# Patient Record
Sex: MALE | Race: BLACK OR AFRICAN AMERICAN | NOT HISPANIC OR LATINO | Employment: FULL TIME | URBAN - METROPOLITAN AREA
[De-identification: names, ages, dates, MRNs, and addresses within clinical notes are randomized per-mention and may not be internally consistent; named-entity substitution may affect disease eponyms.]

---

## 2017-01-06 ENCOUNTER — THERAPY VISIT (OUTPATIENT)
Dept: PHYSICAL THERAPY | Facility: CLINIC | Age: 67
End: 2017-01-06
Payer: COMMERCIAL

## 2017-01-06 DIAGNOSIS — M25.819 SHOULDER IMPINGEMENT, UNSPECIFIED LATERALITY: Primary | ICD-10-CM

## 2017-01-06 PROCEDURE — 97112 NEUROMUSCULAR REEDUCATION: CPT | Mod: GP | Performed by: PHYSICAL THERAPY ASSISTANT

## 2017-01-06 PROCEDURE — 97140 MANUAL THERAPY 1/> REGIONS: CPT | Mod: GP | Performed by: PHYSICAL THERAPY ASSISTANT

## 2017-01-06 PROCEDURE — 97110 THERAPEUTIC EXERCISES: CPT | Mod: GP | Performed by: PHYSICAL THERAPY ASSISTANT

## 2017-01-13 ENCOUNTER — OFFICE VISIT (OUTPATIENT)
Dept: FAMILY MEDICINE | Facility: CLINIC | Age: 67
End: 2017-01-13
Payer: COMMERCIAL

## 2017-01-13 VITALS
HEIGHT: 65 IN | WEIGHT: 139 LBS | DIASTOLIC BLOOD PRESSURE: 64 MMHG | TEMPERATURE: 98.6 F | SYSTOLIC BLOOD PRESSURE: 128 MMHG | HEART RATE: 92 BPM | BODY MASS INDEX: 23.16 KG/M2

## 2017-01-13 DIAGNOSIS — E78.5 HYPERLIPIDEMIA WITH TARGET LDL LESS THAN 100: ICD-10-CM

## 2017-01-13 DIAGNOSIS — I10 BENIGN ESSENTIAL HYPERTENSION: ICD-10-CM

## 2017-01-13 DIAGNOSIS — M25.819 SHOULDER IMPINGEMENT, UNSPECIFIED LATERALITY: Primary | ICD-10-CM

## 2017-01-13 DIAGNOSIS — Z23 NEED FOR PROPHYLACTIC VACCINATION AND INOCULATION AGAINST INFLUENZA: ICD-10-CM

## 2017-01-13 DIAGNOSIS — E11.9 TYPE 2 DIABETES MELLITUS WITHOUT COMPLICATION, WITHOUT LONG-TERM CURRENT USE OF INSULIN (H): ICD-10-CM

## 2017-01-13 PROCEDURE — 82043 UR ALBUMIN QUANTITATIVE: CPT | Performed by: FAMILY MEDICINE

## 2017-01-13 PROCEDURE — 99214 OFFICE O/P EST MOD 30 MIN: CPT | Mod: 25 | Performed by: FAMILY MEDICINE

## 2017-01-13 PROCEDURE — 90662 IIV NO PRSV INCREASED AG IM: CPT | Performed by: FAMILY MEDICINE

## 2017-01-13 PROCEDURE — G0009 ADMIN PNEUMOCOCCAL VACCINE: HCPCS | Performed by: FAMILY MEDICINE

## 2017-01-13 RX ORDER — TRAMADOL HYDROCHLORIDE 50 MG/1
50 TABLET ORAL EVERY 6 HOURS PRN
Qty: 30 TABLET | Refills: 0 | Status: SHIPPED | OUTPATIENT
Start: 2017-01-13 | End: 2017-04-18

## 2017-01-13 RX ORDER — TRAMADOL HYDROCHLORIDE 50 MG/1
50-100 TABLET ORAL EVERY 6 HOURS PRN
Qty: 30 TABLET | Refills: 0 | Status: SHIPPED | OUTPATIENT
Start: 2017-01-13 | End: 2017-06-22

## 2017-01-13 RX ORDER — TRAMADOL HYDROCHLORIDE 50 MG/1
50-100 TABLET ORAL EVERY 6 HOURS PRN
Qty: 30 TABLET | Refills: 0 | Status: SHIPPED | OUTPATIENT
Start: 2017-01-13 | End: 2017-04-07

## 2017-01-13 NOTE — PROGRESS NOTES
Injectable Influenza Immunization Documentation    1.  Is the person to be vaccinated sick today?  No    2. Does the person to be vaccinated have an allergy to eggs or to a component of the vaccine?  No    3. Has the person to be vaccinated today ever had a serious reaction to influenza vaccine in the past?  No    4. Has the person to be vaccinated ever had Guillain-[place] syndrome?  No     Form completed by Courtney Holley MA

## 2017-01-13 NOTE — NURSING NOTE
"Chief Complaint   Patient presents with     Chronic Disease Management       Initial /64 mmHg  Pulse 92  Temp(Src) 98.6  F (37  C) (Oral)  Ht 5' 4.5\" (1.638 m)  Wt 139 lb (63.05 kg)  BMI 23.50 kg/m2 Estimated body mass index is 23.5 kg/(m^2) as calculated from the following:    Height as of this encounter: 5' 4.5\" (1.638 m).    Weight as of this encounter: 139 lb (63.05 kg).  BP completed using cuff size: regular    Courtney Holley MA   "

## 2017-01-13 NOTE — PROGRESS NOTES
SUBJECTIVE:                                                    Devon Watts is a 66 year old male who presents to clinic today for the following health issues:    Diabetes Follow-up      Patient is checking blood sugars: Usually every day 111     Diabetic concerns: None     Symptoms of hypoglycemia (low blood sugar): none     Paresthesias (numbness or burning in feet) or sores: No     Date of last diabetic eye exam: December 2016  A1C      6.4   9/9/2016     Hyperlipidemia Follow-Up      Rate your low fat/cholesterol diet?: not monitoring fat    Taking statin?  Yes, no muscle aches from statin    Other lipid medications/supplements?:  None  LDL CHOLESTEROL DIRECT   Date Value Ref Range Status   09/09/2016 86 <100 mg/dL Final     Comment:     Desirable:       <100 mg/dl   ]       Amount of exercise or physical activity: Every day, he goes to physical therapy    Problems taking medications regularly: No    Medication side effects: none    Diet: low salt     Hypertension Follow-up      Outpatient blood pressures are not being checked.    Low Salt Diet: no added salt     HPI;  He has impingement syndrome in his right shoulder.  He had been on tramadol for this for a long time.  He started PT in 9/2016.  He has found this to be very helpful.  He does twice a week.  He has also been doing it at home.  The pain as 10/10 and now is 6/10.   He is still taking tramadol 0-1 a day either before bed or before work if he has pain.   He has not had a steroid shot.  He is requesting a flu shot.  His PT thinks there is arthritis in his shoulder.  We have not received his records from his old clinic.  He had a colonoscopy twice.  He thinks they were both normal.  He says it has been a while.      Problem list and histories reviewed & adjusted, as indicated.  Additional history: as documented    BP Readings from Last 3 Encounters:   01/13/17 128/64   10/17/16 122/54   09/09/16 112/62    Wt Readings from Last 3 Encounters:  "  01/13/17 139 lb (63.05 kg)   10/17/16 134 lb (60.782 kg)   09/09/16 134 lb (60.782 kg)         ROS:  Constitutional, HEENT, cardiovascular, pulmonary, GI, , musculoskeletal, neuro, skin, endocrine and psych systems are negative, except as otherwise noted.    OBJECTIVE:                                                    /64 mmHg  Pulse 92  Temp(Src) 98.6  F (37  C) (Oral)  Ht 5' 4.5\" (1.638 m)  Wt 139 lb (63.05 kg)  BMI 23.50 kg/m2  Body mass index is 23.5 kg/(m^2).  GENERAL: healthy, alert and no distress  HENT: ear canals and TM's normal, nose and mouth without ulcers or lesions  NECK: no adenopathy, no asymmetry, masses, or scars and thyroid normal to palpation  RESP: lungs clear to auscultation - no rales, rhonchi or wheezes  CV: regular rate and rhythm, normal S1 S2, no S3 or S4, no murmur, click or rub, no peripheral edema and peripheral pulses strong  MS: tenderness to palpation right anterior shoulder with tenderness over supra and infraspinatus   SKIN: no suspicious lesions or rashes  NEURO: Normal strength and tone, mentation intact and speech normal  PSYCH: mentation appears normal, affect normal/bright    Diagnostic Test Results:  none      ASSESSMENT/PLAN:                                                    Diabetes Type II, A1c Controlled, non-insulin dependent   Associated with the following complications    Renal Complications:  None    Ophthalmologic Complications: None    Neurologic Complications: None    Peripheral Vascular Complications:  None    Other: None   Plan:  No changes in the patient's current treatment plan  Labs:  Microalbumin    Hyperlipidemia; controlled   Plan:  No changes in the patient's current treatment plan    Hypertension; controlled   Associated with the following complications:    Diabetes   Plan:  No changes in the patient's current treatment plan          ICD-10-CM    1. Shoulder impingement, unspecified laterality M75.40 traMADol (ULTRAM) 50 MG tablet     " traMADol (ULTRAM) 50 MG tablet     traMADol (ULTRAM) 50 MG tablet   2. Type 2 diabetes mellitus without complication, without long-term current use of insulin (H) E11.9 Albumin Random Urine Quantitative   3. Hyperlipidemia with target LDL less than 100 E78.5    4. Benign essential hypertension I10    5. Need for prophylactic vaccination and inoculation against influenza Z23 FLU VACCINE, INCREASED ANTIGEN, PRESV FREE, AGE 65+ [46211]     Vaccine Administration, Initial [80658]     We discussed referral to ortho and steroid injection.  He will hold off for now.     FUTURE APPOINTMENTS:       - Follow-up visit in 3 months for tramadol and DM    Niocle Magdaleno DO  [facility]

## 2017-01-13 NOTE — Clinical Note
[facility]  [address] [place] [id number] [phone]      January 16, 2017      Devon Jonesjosse  [address] [address]          Dear Mr. Watts    The results of your recent lab tests were within normal limits. Enclosed is a copy of these results.  If you have any further questions or problems, please contact our office.    Sincerely,      Nicole Magdaleno, DO/sd    Results for orders placed or performed in visit on 01/13/17   Albumin Random Urine Quantitative   Result Value Ref Range    Creatinine Urine 105 mg/dL    Albumin Urine mg/L 6 mg/L    Albumin Urine mg/g Cr 5.72 0 - 17 mg/g Cr

## 2017-01-13 NOTE — MR AVS SNAPSHOT
"              After Visit Summary   1/13/2017    Devon Watts    MRN: [id number]           Patient Information     Date Of Birth          1950        Visit Information        Provider Department      1/13/2017 12:40 PM Nicole Magdaleno DO [facility]        Today's Diagnoses     Shoulder impingement, unspecified laterality    -  1     Type 2 diabetes mellitus without complication, without long-term current use of insulin (H)         Hyperlipidemia with target LDL less than 100         Benign essential hypertension         Impingement syndrome of both shoulders           Care Instructions    Follow up for DM in 3 months.     Nicole Magdaleno D.OSteven          Follow-ups after your visit        Who to contact     If you have questions or need follow up information about today's clinic visit or your schedule please contact [facility] directly at [phone].  Normal or non-critical lab and imaging results will be communicated to you by MyChart, letter or phone within 4 business days after the clinic has received the results. If you do not hear from us within 7 days, please contact the clinic through MyChart or phone. If you have a critical or abnormal lab result, we will notify you by phone as soon as possible.  Submit refill requests through [employer] Â® or call your pharmacy and they will forward the refill request to us. Please allow 3 business days for your refill to be completed.          Additional Information About Your Visit        Metropolisthart Information     [employer] Â® lets you send messages to your doctor, view your test results, renew your prescriptions, schedule appointments and more. To sign up, go to [email or web address] Â® . Click on \"Log in\" on the left side of the screen, which will take you to the Welcome page. Then click on \"Sign up Now\" on the right side of the page.     You will be asked to enter the access code listed below, as well as some personal information. Please " "follow the directions to create your username and password.     Your access code is: [email or web address]  Expires: 2017  1:14 PM     Your access code will  in 90 days. If you need help or a new code, please call your [facility] clinic or [phone].        Care EveryWhere ID     This is your Care EveryWhere ID. This could be used by other organizations to access your [facility] medical records  [id number]        Your Vitals Were     Pulse Temperature Height BMI (Body Mass Index)          92 98.6  F (37  C) (Oral) 5' 4.5\" (1.638 m) 23.50 kg/m2         Blood Pressure from Last 3 Encounters:   17 128/64   10/17/16 122/54   16 112/62    Weight from Last 3 Encounters:   17 139 lb (63.05 kg)   10/17/16 134 lb (60.782 kg)   16 134 lb (60.782 kg)              Today, you had the following     No orders found for display         Today's Medication Changes          These changes are accurate as of: 17  1:14 PM.  If you have any questions, ask your nurse or doctor.               These medicines have changed or have updated prescriptions.        Dose/Directions    * traMADol 50 MG tablet   Commonly known as:  ULTRAM   This may have changed:  additional instructions   Used for:  Impingement syndrome of both shoulders   Changed by:  Nicole Magdaleno, DO        Dose:   mg   Take 1-2 tablets ( mg) by mouth every 6 hours as needed for pain (maximum 8 tablets per day) Do not fill before 3/9/17   Quantity:  30 tablet   Refills:  0       * traMADol 50 MG tablet   Commonly known as:  ULTRAM   This may have changed:  additional instructions   Used for:  Impingement syndrome of both shoulders   Changed by:  Nicole Magdaleno,         Dose:   mg   Take 1-2 tablets ( mg) by mouth every 6 hours as needed for pain (maximum 8 tablets per day) Do not fill before 17   Quantity:  30 tablet   Refills:  0       * traMADol 50 MG tablet   Commonly known as:  ULTRAM   This may have changed:  " additional instructions   Used for:  Impingement syndrome of both shoulders   Changed by:  Nicole Magdaleno DO        Dose:  50 mg   Take 1 tablet (50 mg) by mouth every 6 hours as needed for moderate pain Take 1-2 tablets as needed do not fill before 1/13/17   Quantity:  30 tablet   Refills:  0       * Notice:  This list has 3 medication(s) that are the same as other medications prescribed for you. Read the directions carefully, and ask your doctor or other care provider to review them with you.         Where to get your medicines      Some of these will need a paper prescription and others can be bought over the counter.  Ask your nurse if you have questions.     Bring a paper prescription for each of these medications    - traMADol 50 MG tablet  - traMADol 50 MG tablet  - traMADol 50 MG tablet             Primary Care Provider Office Phone # Fax #    Nicole Magdaleno [phone]       [address] [address]        Thank you!     Thank you for choosing [facility]  for your care. Our goal is always to provide you with excellent care. Hearing back from our patients is one way we can continue to improve our services. Please take a few minutes to complete the written survey that you may receive in the mail after your visit with us. Thank you!             Your Updated Medication List - Protect others around you: Learn how to safely use, store and throw away your medicines at www.disposemymeds.org.          This list is accurate as of: 1/13/17  1:14 PM.  Always use your most recent med list.                   Brand Name Dispense Instructions for use    aspirin 325 MG tablet      Take 325 mg by mouth daily       atorvastatin 10 MG tablet    LIPITOR    90 tablet    Take 1 tablet (10 mg) by mouth daily       glipiZIDE 10 MG tablet    GLUCOTROL    180 tablet    Take 1 tablet (10 mg) by mouth 2 times daily (before meals)       lisinopril 20 MG  tablet    PRINIVIL/ZESTRIL    90 tablet    Take 1 tablet (20 mg) by mouth daily       metFORMIN 1000 MG tablet    GLUCOPHAGE    180 tablet    Take 1 tablet (1,000 mg) by mouth 2 times daily (with meals)       * traMADol 50 MG tablet    ULTRAM    30 tablet    Take 1-2 tablets ( mg) by mouth every 6 hours as needed for pain (maximum 8 tablets per day) Do not fill before 3/9/17       * traMADol 50 MG tablet    ULTRAM    30 tablet    Take 1-2 tablets ( mg) by mouth every 6 hours as needed for pain (maximum 8 tablets per day) Do not fill before 2/11/17       * traMADol 50 MG tablet    ULTRAM    30 tablet    Take 1 tablet (50 mg) by mouth every 6 hours as needed for moderate pain Take 1-2 tablets as needed do not fill before 1/13/17       * Notice:  This list has 3 medication(s) that are the same as other medications prescribed for you. Read the directions carefully, and ask your doctor or other care provider to review them with you.

## 2017-01-14 LAB
CREAT UR-MCNC: 105 MG/DL
MICROALBUMIN UR-MCNC: 6 MG/L
MICROALBUMIN/CREAT UR: 5.72 MG/G CR (ref 0–17)

## 2017-02-13 DIAGNOSIS — E78.5 HYPERLIPIDEMIA WITH TARGET LDL LESS THAN 100: ICD-10-CM

## 2017-02-13 NOTE — TELEPHONE ENCOUNTER
atorvastatin (LIPITOR) 10 MG tablet     Last Written Prescription Date: 9-9-2016  Last Fill Quantity: 90, # refills: 1  Last Office Visit with [facility], [facility] or [facility] prescribing provider: 1-  Next 5 appointments (look out 90 days)     Apr 18, 2017  3:00 PM CDT   Office Visit with Nicole Magdaleno DO   [facility]    [address] [place] [id number] [phone]                   No results found for: CHOL  No results found for: HDL  Lab Results   Component Value Date    LDL 86 09/09/2016     No results found for: TRIG  No results found for: CHOLHDLRATIO

## 2017-02-14 RX ORDER — ATORVASTATIN CALCIUM 10 MG/1
10 TABLET, FILM COATED ORAL DAILY
Qty: 90 TABLET | Refills: 1 | Status: SHIPPED | OUTPATIENT
Start: 2017-02-14 | End: 2017-11-20

## 2017-02-14 NOTE — TELEPHONE ENCOUNTER
Some labs weren't done at last visit but MD reviewed in 9/9 note, prescription approved per [facility] [place] Refill Protocol.  Irena Govea RN

## 2017-04-07 DIAGNOSIS — M25.819 SHOULDER IMPINGEMENT, UNSPECIFIED LATERALITY: ICD-10-CM

## 2017-04-10 NOTE — TELEPHONE ENCOUNTER
traMADol (ULTRAM) 50 MG tablet    mg, EVERY 6 HOURS PRN 0 ordered  Edit     Summary: Take 1-2 tablets ( mg) by mouth every 6 hours as needed for pain (maximum 8 tablets per day) Do not fill before 3/9/17, Disp-30 tablet, R-0, Local Print   Dose, Route, Frequency:  mg, Oral, EVERY 6 HOURS PRN  Start: 1/13/2017  Ord/Sold: 1/13/2017 (O)  Report  Taking:   Long-term:   Pharmacy: [facility] [id number] [place] [address] AT SEC OF [facility] Dose History       Patient Sig: Take 1-2 tablets ( mg) by mouth every 6 hours as needed for pain (maximum 8 tablets per day) Do not fill before 3/9/17       Ordered on: 1/13/2017       Authorized by: IFEOMA NORIEGA       Dispense: 30 tablet       Admin Instructions: Do not fill before 3/9/17          Last Office Visit with [facility] or  Health prescribing provider: 1-  Future Office visit:    Next 5 appointments (look out 90 days)     Apr 18, 2017  3:00 PM CDT   Office Visit with Ifeoma Noriega DO   [facility]    [address] [place] [id number] [phone]                   Routing refill request to provider for review/approval because:  Drug not on the [facility], P or  Health refill protocol or controlled substance

## 2017-04-11 RX ORDER — TRAMADOL HYDROCHLORIDE 50 MG/1
TABLET ORAL
Qty: 30 TABLET | Refills: 0 | Status: SHIPPED | OUTPATIENT
Start: 2017-04-11 | End: 2017-04-18

## 2017-04-11 NOTE — TELEPHONE ENCOUNTER
Okay for refill one month but he was told to come in in April for refills.      Nicole Magdaleno D.O.

## 2017-04-18 ENCOUNTER — OFFICE VISIT (OUTPATIENT)
Dept: FAMILY MEDICINE | Facility: CLINIC | Age: 67
End: 2017-04-18
Payer: COMMERCIAL

## 2017-04-18 VITALS
HEART RATE: 88 BPM | DIASTOLIC BLOOD PRESSURE: 62 MMHG | HEIGHT: 65 IN | TEMPERATURE: 97.6 F | SYSTOLIC BLOOD PRESSURE: 116 MMHG | BODY MASS INDEX: 22.08 KG/M2 | WEIGHT: 132.5 LBS

## 2017-04-18 DIAGNOSIS — Z11.59 NEED FOR HEPATITIS C SCREENING TEST: ICD-10-CM

## 2017-04-18 DIAGNOSIS — Z13.89 SCREENING FOR DIABETIC PERIPHERAL NEUROPATHY: ICD-10-CM

## 2017-04-18 DIAGNOSIS — M75.41 IMPINGEMENT SYNDROME OF BOTH SHOULDERS: ICD-10-CM

## 2017-04-18 DIAGNOSIS — I10 BENIGN ESSENTIAL HYPERTENSION: ICD-10-CM

## 2017-04-18 DIAGNOSIS — Z23 NEED FOR PROPHYLACTIC VACCINATION AGAINST STREPTOCOCCUS PNEUMONIAE (PNEUMOCOCCUS): ICD-10-CM

## 2017-04-18 DIAGNOSIS — E78.5 HYPERLIPIDEMIA WITH TARGET LDL LESS THAN 100: ICD-10-CM

## 2017-04-18 DIAGNOSIS — M75.42 IMPINGEMENT SYNDROME OF BOTH SHOULDERS: ICD-10-CM

## 2017-04-18 DIAGNOSIS — E11.9 TYPE 2 DIABETES MELLITUS WITHOUT COMPLICATION, WITHOUT LONG-TERM CURRENT USE OF INSULIN (H): Primary | ICD-10-CM

## 2017-04-18 LAB — HBA1C MFR BLD: 6.4 % (ref 4.3–6)

## 2017-04-18 PROCEDURE — 83036 HEMOGLOBIN GLYCOSYLATED A1C: CPT | Performed by: FAMILY MEDICINE

## 2017-04-18 PROCEDURE — 86803 HEPATITIS C AB TEST: CPT | Performed by: FAMILY MEDICINE

## 2017-04-18 PROCEDURE — 99214 OFFICE O/P EST MOD 30 MIN: CPT | Mod: 25 | Performed by: FAMILY MEDICINE

## 2017-04-18 PROCEDURE — 36415 COLL VENOUS BLD VENIPUNCTURE: CPT | Performed by: FAMILY MEDICINE

## 2017-04-18 PROCEDURE — 80048 BASIC METABOLIC PNL TOTAL CA: CPT | Performed by: FAMILY MEDICINE

## 2017-04-18 PROCEDURE — 90471 IMMUNIZATION ADMIN: CPT | Performed by: FAMILY MEDICINE

## 2017-04-18 PROCEDURE — 90732 PPSV23 VACC 2 YRS+ SUBQ/IM: CPT | Performed by: FAMILY MEDICINE

## 2017-04-18 PROCEDURE — 84443 ASSAY THYROID STIM HORMONE: CPT | Performed by: FAMILY MEDICINE

## 2017-04-18 RX ORDER — LISINOPRIL 20 MG/1
20 TABLET ORAL DAILY
Qty: 90 TABLET | Refills: 1 | Status: SHIPPED | OUTPATIENT
Start: 2017-04-18 | End: 2017-11-20

## 2017-04-18 RX ORDER — TRAMADOL HYDROCHLORIDE 50 MG/1
50 TABLET ORAL EVERY 6 HOURS PRN
Qty: 30 TABLET | Refills: 0 | Status: SHIPPED | OUTPATIENT
Start: 2017-04-18 | End: 2017-06-22

## 2017-04-18 RX ORDER — GLIPIZIDE 10 MG/1
10 TABLET ORAL
Qty: 180 TABLET | Refills: 1 | Status: SHIPPED | OUTPATIENT
Start: 2017-04-18 | End: 2017-11-20

## 2017-04-18 RX ORDER — TRAMADOL HYDROCHLORIDE 50 MG/1
TABLET ORAL
Qty: 30 TABLET | Refills: 0 | Status: SHIPPED | OUTPATIENT
Start: 2017-04-18 | End: 2017-06-22

## 2017-04-18 NOTE — MR AVS SNAPSHOT
"              After Visit Summary   4/18/2017    Devon Watts    MRN: [id number]           Patient Information     Date Of Birth          1950        Visit Information        Provider Department      4/18/2017 3:00 PM Nicole Magdaleno DO [facility]        Today's Diagnoses     Type 2 diabetes mellitus without complication, without long-term current use of insulin (H)    -  1    Need for hepatitis C screening test        Screening for diabetic peripheral neuropathy        Need for prophylactic vaccination against Streptococcus pneumoniae (pneumococcus)        Benign essential hypertension        Hyperlipidemia with target LDL less than 100        Impingement syndrome of both shoulders        Shoulder impingement, unspecified laterality           Follow-ups after your visit        Who to contact     If you have questions or need follow up information about today's clinic visit or your schedule please contact [facility] directly at [phone].  Normal or non-critical lab and imaging results will be communicated to you by Thinkgluehart, letter or phone within 4 business days after the clinic has received the results. If you do not hear from us within 7 days, please contact the clinic through Thinkgluehart or phone. If you have a critical or abnormal lab result, we will notify you by phone as soon as possible.  Submit refill requests through M[employer] or call your pharmacy and they will forward the refill request to us. Please allow 3 business days for your refill to be completed.          Additional Information About Your Visit        MyChart Information     Mersive lets you send messages to your doctor, view your test results, renew your prescriptions, schedule appointments and more. To sign up, go to [email or web address] . Click on \"Log in\" on the left side of the screen, which will take you to the Welcome page. Then click on \"Sign up Now\" on the right side of the page.     You will " "be asked to enter the access code listed below, as well as some personal information. Please follow the directions to create your username and password.     Your access code is: [email or web address]  Expires: 2017  3:55 PM     Your access code will  in 90 days. If you need help or a new code, please call your [facility] clinic or [phone].        Care EveryWhere ID     This is your Care EveryWhere ID. This could be used by other organizations to access your [facility] medical records  [id number]        Your Vitals Were     Pulse Temperature Height BMI (Body Mass Index)          88 97.6  F (36.4  C) (Oral) 5' 4.5\" (1.638 m) 22.39 kg/m2         Blood Pressure from Last 3 Encounters:   17 116/62   17 128/64   10/17/16 122/54    Weight from Last 3 Encounters:   17 132 lb 8 oz (60.1 kg)   17 139 lb (63 kg)   10/17/16 134 lb (60.8 kg)              We Performed the Following     Basic metabolic panel  (Ca, Cl, CO2, Creat, Gluc, K, Na, BUN)     HEMOGLOBIN A1C     Hepatitis C Screen Reflex to HCV RNA Quant and Genotype     Pneumococcal vaccine 23 valent PPSV23  (Pneumovax) [30351]     TSH WITH FREE T4 REFLEX          Today's Medication Changes          These changes are accurate as of: 17  3:55 PM.  If you have any questions, ask your nurse or doctor.               These medicines have changed or have updated prescriptions.        Dose/Directions    * traMADol 50 MG tablet   Commonly known as:  ULTRAM   This may have changed:  Another medication with the same name was changed. Make sure you understand how and when to take each.   Used for:  Shoulder impingement, unspecified laterality   Changed by:  Nicole Magdaleno,         Dose:   mg   Take 1-2 tablets ( mg) by mouth every 6 hours as needed for pain (maximum 8 tablets per day) Do not fill before 17   Quantity:  30 tablet   Refills:  0       * traMADol 50 MG tablet   Commonly known as:  ULTRAM   This may have changed:  See the " new instructions.   Used for:  Shoulder impingement, unspecified laterality   Changed by:  Nicole Magdaleno DO        TAKE 1 TO 2 TABLETS BY MOUTH EVERY 6 HOURS AS NEEDED FOR PAIN(MAX 8 TABLETS PER DAY) Do not fill before 5/11/17   Quantity:  30 tablet   Refills:  0       * traMADol 50 MG tablet   Commonly known as:  ULTRAM   This may have changed:  additional instructions   Used for:  Shoulder impingement, unspecified laterality   Changed by:  Nicole Magdaleno DO        Dose:  50 mg   Take 1 tablet (50 mg) by mouth every 6 hours as needed for moderate pain Take 1-2 tablets as needed do not fill before 6/11/17   Quantity:  30 tablet   Refills:  0       * Notice:  This list has 3 medication(s) that are the same as other medications prescribed for you. Read the directions carefully, and ask your doctor or other care provider to review them with you.         Where to get your medicines      These medications were sent to [facility] [address] [place] [address] AT [facility] OF [address] [address]     Phone:  [phone]     glipiZIDE 10 MG tablet    lisinopril 20 MG tablet    metFORMIN 1000 MG tablet         Some of these will need a paper prescription and others can be bought over the counter.  Ask your nurse if you have questions.     Bring a paper prescription for each of these medications     traMADol 50 MG tablet    traMADol 50 MG tablet                Primary Care Provider Office Phone # Fax #    Nicole DO Sharla [phone]       [address] [address]        Thank you!     Thank you for choosing [facility]  for your care. Our goal is always to provide you with excellent care. Hearing back from our patients is one way we can continue to improve our services. Please take a few minutes to complete the written survey that you may receive in the mail after your visit with us. Thank  you!             Your Updated Medication List - Protect others around you: Learn how to safely use, store and throw away your medicines at www.disposemymeds.org.          This list is accurate as of: 4/18/17  3:55 PM.  Always use your most recent med list.                   Brand Name Dispense Instructions for use    aspirin 325 MG tablet      Take 325 mg by mouth daily       atorvastatin 10 MG tablet    LIPITOR    90 tablet    Take 1 tablet (10 mg) by mouth daily       glipiZIDE 10 MG tablet    GLUCOTROL    180 tablet    Take 1 tablet (10 mg) by mouth 2 times daily (before meals)       lisinopril 20 MG tablet    PRINIVIL/ZESTRIL    90 tablet    Take 1 tablet (20 mg) by mouth daily       metFORMIN 1000 MG tablet    GLUCOPHAGE    180 tablet    Take 1 tablet (1,000 mg) by mouth 2 times daily (with meals)       * traMADol 50 MG tablet    ULTRAM    30 tablet    Take 1-2 tablets ( mg) by mouth every 6 hours as needed for pain (maximum 8 tablets per day) Do not fill before 2/11/17       * traMADol 50 MG tablet    ULTRAM    30 tablet    TAKE 1 TO 2 TABLETS BY MOUTH EVERY 6 HOURS AS NEEDED FOR PAIN(MAX 8 TABLETS PER DAY) Do not fill before 5/11/17       * traMADol 50 MG tablet    ULTRAM    30 tablet    Take 1 tablet (50 mg) by mouth every 6 hours as needed for moderate pain Take 1-2 tablets as needed do not fill before 6/11/17       * Notice:  This list has 3 medication(s) that are the same as other medications prescribed for you. Read the directions carefully, and ask your doctor or other care provider to review them with you.

## 2017-04-18 NOTE — PROGRESS NOTES
"  SUBJECTIVE:                                                    Devon Watts is a 66 year old male who presents to clinic today for the following health issues:      Diabetes Follow-up    Patient is checking blood sugars: once daily.  Results are as follows:         am - 111 this morning          Last night - 132    Diabetic concerns: None     Symptoms of hypoglycemia (low blood sugar): none     Paresthesias (numbness or burning in feet) or sores: No     Date of last diabetic eye exam: within the last year    Glucophage, glipizide   Lab Results   Component Value Date    A1C 6.4 04/18/2017    A1C 6.4 09/09/2016            Hyperlipidemia Follow-Up      Rate your low fat/cholesterol diet?: good    Taking statin?  yes    Other lipid medications/supplements?:  None  LDL Cholesterol Direct   Date Value Ref Range Status   09/09/2016 86 <100 mg/dL Final     Comment:     Desirable:       <100 mg/dl          Hypertension Follow-up      Outpatient blood pressures are not being checked.    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  He take one a day but sometimes two.  He has done PT which helped a lot.  He continues to do this at home.  He is not interested in steroid injections.  He used to play field hockey.      He had a colonoscopy several years ago.  We do not have the records.  He has even stopped by the clinic to get the forms.        Amount of exercise or physical activity: mostly everyday with doing PT exercises     Problems taking medications regularly: No    Medication side effects: none    Diet: regular (no restrictions)      Patient is concerned about taking the 1000mg tablets of the metformin twice daily.  He has a problem with the tablets being \"huge\".    Problem list and histories reviewed & adjusted, as indicated.  Additional history: as documented    BP Readings from Last 3 Encounters:   04/18/17 116/62   01/13/17 128/64   10/17/16 122/54    Wt Readings from " "Last 3 Encounters:   04/18/17 132 lb 8 oz (60.1 kg)   01/13/17 139 lb (63 kg)   10/17/16 134 lb (60.8 kg)                    Reviewed and updated as needed this visit by clinical staff       Reviewed and updated as needed this visit by Provider         ROS:  Constitutional, HEENT, cardiovascular, pulmonary, GI, , musculoskeletal, neuro, skin, endocrine and psych systems are negative, except as otherwise noted.    OBJECTIVE:                                                    /62 (BP Location: Right arm, Cuff Size: Adult Regular)  Pulse 88  Temp 97.6  F (36.4  C) (Oral)  Ht 5' 4.5\" (1.638 m)  Wt 132 lb 8 oz (60.1 kg)  BMI 22.39 kg/m2  Body mass index is 22.39 kg/(m^2).  GENERAL: healthy, alert and no distress  HENT: ear canals and TM's normal, nose and mouth without ulcers or lesions  NECK: no adenopathy, no asymmetry, masses, or scars and thyroid normal to palpation  RESP: lungs clear to auscultation - no rales, rhonchi or wheezes  CV: regular rate and rhythm, normal S1 S2, no S3 or S4, no murmur, click or rub, no peripheral edema and peripheral pulses strong  MS: no gross musculoskeletal defects noted, no edema  SKIN: no suspicious lesions or rashes  NEURO: Normal strength and tone, mentation intact and speech normal  PSYCH: mentation appears normal, affect normal/bright    Diagnostic Test Results:  Results for orders placed or performed in visit on 04/18/17   HEMOGLOBIN A1C   Result Value Ref Range    Hemoglobin A1C 6.4 (H) 4.3 - 6.0 %   **Hepatitis C Screen Reflex to RNA FUTURE anytime   Result Value Ref Range    Hepatitis C Antibody  NR     Nonreactive   Assay performance characteristics have not been established for newborns,   infants, and children     TSH with free T4 reflex   Result Value Ref Range    TSH 3.71 0.40 - 4.00 mU/L   Basic metabolic panel  (Ca, Cl, CO2, Creat, Gluc, K, Na, BUN)   Result Value Ref Range    Sodium 146 (H) 133 - 144 mmol/L    Potassium 4.2 3.4 - 5.3 mmol/L    Chloride " "110 (H) 94 - 109 mmol/L    Carbon Dioxide 26 20 - 32 mmol/L    Anion Gap 10 3 - 14 mmol/L    Glucose 54 (L) 70 - 99 mg/dL    Urea Nitrogen 26 7 - 30 mg/dL    Creatinine 1.27 (H) 0.66 - 1.25 mg/dL    GFR Estimate 57 (L) >60 mL/min/1.7m2    GFR Estimate If Black 68 >60 mL/min/1.7m2    Calcium 9.5 8.5 - 10.1 mg/dL        ASSESSMENT/PLAN:                                                    Diabetes Type II, A1c Controlled, non-insulin dependent   Associated with the following complications    Renal Complications:  None    Ophthalmologic Complications: None    Neurologic Complications: None    Peripheral Vascular Complications:  None    Other: None   Plan:  No changes in the patient's current treatment plan    Hyperlipidemia; controlled   Plan:  No changes in the patient's current treatment plan    Hypertension; controlled   Associated with the following complications:    Diabetes   Plan:  No changes in the patient's current treatment plan  Labs:   BMP      BMI:   Estimated body mass index is 22.39 kg/(m^2) as calculated from the following:    Height as of this encounter: 5' 4.5\" (1.638 m).    Weight as of this encounter: 132 lb 8 oz (60.1 kg).   Weight management plan: Discussed healthy diet and exercise guidelines and patient will follow up in 6 months in clinic to re-evaluate.        ICD-10-CM    1. Type 2 diabetes mellitus without complication, without long-term current use of insulin (H) E11.9 HEMOGLOBIN A1C     metFORMIN (GLUCOPHAGE) 1000 MG tablet     glipiZIDE (GLUCOTROL) 10 MG tablet     TSH with free T4 reflex     Basic metabolic panel  (Ca, Cl, CO2, Creat, Gluc, K, Na, BUN)     CANCELED: TSH WITH FREE T4 REFLEX     CANCELED: Basic metabolic panel  (Ca, Cl, CO2, Creat, Gluc, K, Na, BUN)   2. Benign essential hypertension I10 lisinopril (PRINIVIL/ZESTRIL) 20 MG tablet   3. Hyperlipidemia with target LDL less than 100 E78.5    4. Impingement syndrome of both shoulders M75.41     M75.42    5. Need for hepatitis C " screening test Z11.59 **Hepatitis C Screen Reflex to RNA FUTURE anytime     CANCELED: Hepatitis C Screen Reflex to HCV RNA Quant and Genotype   6. Screening for diabetic peripheral neuropathy Z13.89    7. Need for prophylactic vaccination against Streptococcus pneumoniae (pneumococcus) Z23 Pneumococcal vaccine 23 valent PPSV23  (Pneumovax) [56261]     The current medical regimen is effective;  continue present plan and medications.  He has done PT for his shoulder but was stared on tramadol for this by another provider and feels he still needs it.     FUTURE APPOINTMENTS:       - Follow-up visit in 6 months for pain and DM     Nicole Magdaleno DO  [facility]

## 2017-04-18 NOTE — NURSING NOTE
Prior to injection verified patient identity using patient's name and date of birth.  Rosalia Govea CMA    Screening Questionnaire for Adult Immunization    Are you sick today?   No   Do you have allergies to medications, food, a vaccine component or latex?   No   Have you ever had a serious reaction after receiving a vaccination?   No   Do you have a long-term health problem with heart disease, lung disease, asthma, kidney disease, metabolic disease (e.g. diabetes), anemia, or other blood disorder?   No   Do you have cancer, leukemia, HIV/AIDS, or any other immune system problem?   No   In the past 3 months, have you taken medications that affect  your immune system, such as prednisone, other steroids, or anticancer drugs; drugs for the treatment of rheumatoid arthritis, Crohn s disease, or psoriasis; or have you had radiation treatments?   No   Have you had a seizure, or a brain or other nervous system problem?   No   During the past year, have you received a transfusion of blood or blood     products, or been given immune (gamma) globulin or antiviral drug?   No   For women: Are you pregnant or is there a chance you could become        pregnant during the next month?   No   Have you received any vaccinations in the past 4 weeks?   No     Immunization questionnaire answers were all negative.      MNVFC doesn't apply on this patient    Per orders of Dr. Magdaleno, injection of Pneumococcal 23 given by Rosalia Govea. Patient instructed to remain in clinic for 20 minutes afterwards, and to report any adverse reaction to me immediately.       Screening performed by Rosalia Govea on 4/18/2017 at 6:36 PM.

## 2017-04-18 NOTE — NURSING NOTE
"Chief Complaint   Patient presents with     Diabetes       Initial There were no vitals taken for this visit. Estimated body mass index is 23.49 kg/(m^2) as calculated from the following:    Height as of 1/13/17: 5' 4.5\" (1.638 m).    Weight as of 1/13/17: 139 lb (63 kg).  Medication Reconciliation: complete   Rosalia Govea CMA      "

## 2017-04-19 LAB
ANION GAP SERPL CALCULATED.3IONS-SCNC: 10 MMOL/L (ref 3–14)
BUN SERPL-MCNC: 26 MG/DL (ref 7–30)
CALCIUM SERPL-MCNC: 9.5 MG/DL (ref 8.5–10.1)
CHLORIDE SERPL-SCNC: 110 MMOL/L (ref 94–109)
CO2 SERPL-SCNC: 26 MMOL/L (ref 20–32)
CREAT SERPL-MCNC: 1.27 MG/DL (ref 0.66–1.25)
GFR SERPL CREATININE-BSD FRML MDRD: 57 ML/MIN/1.7M2
GLUCOSE SERPL-MCNC: 54 MG/DL (ref 70–99)
HCV AB SERPL QL IA: NORMAL
POTASSIUM SERPL-SCNC: 4.2 MMOL/L (ref 3.4–5.3)
SODIUM SERPL-SCNC: 146 MMOL/L (ref 133–144)
TSH SERPL DL<=0.005 MIU/L-ACNC: 3.71 MU/L (ref 0.4–4)

## 2017-04-20 ENCOUNTER — TELEPHONE (OUTPATIENT)
Dept: FAMILY MEDICINE | Facility: CLINIC | Age: 67
End: 2017-04-20

## 2017-04-20 DIAGNOSIS — R79.89 ELEVATED SERUM CREATININE: Primary | ICD-10-CM

## 2017-04-20 NOTE — TELEPHONE ENCOUNTER
Telephone call to Devon at home #. Informed of the decreased kidney function and the need to adequately hydrate with water and then recheck the blood test.  I scheduled him for repeat lab on Friday 4/28 as that is the earliest he felt he could come back in.  Joselyn Starkey RN  Triage  [facility]  [phone]

## 2017-04-20 NOTE — TELEPHONE ENCOUNTER
Please let this patient know that his renal function has worsened.  He should drink lots of water and we can recheck this.  If it stays diminished we may have to adjust his DM medication because metformin goes through the kidneys.    Nicole Magdaleno D.O.

## 2017-05-23 ENCOUNTER — TELEPHONE (OUTPATIENT)
Dept: FAMILY MEDICINE | Facility: CLINIC | Age: 67
End: 2017-05-23

## 2017-05-23 NOTE — TELEPHONE ENCOUNTER
5/23/2017    Call Regarding Preventive Health Screening Colonoscopy    Attempt 1    Message Spoke with patient, he does does not feel so good. Patient would like a callback next time    Comments:       Outreach   CC

## 2017-06-09 ENCOUNTER — TELEPHONE (OUTPATIENT)
Dept: GASTROENTEROLOGY | Facility: OUTPATIENT CENTER | Age: 67
End: 2017-06-09

## 2017-06-13 ENCOUNTER — TELEPHONE (OUTPATIENT)
Dept: GASTROENTEROLOGY | Facility: OUTPATIENT CENTER | Age: 67
End: 2017-06-13

## 2017-06-20 ENCOUNTER — TELEPHONE (OUTPATIENT)
Dept: GASTROENTEROLOGY | Facility: OUTPATIENT CENTER | Age: 67
End: 2017-06-20

## 2017-06-22 ENCOUNTER — OFFICE VISIT (OUTPATIENT)
Dept: FAMILY MEDICINE | Facility: CLINIC | Age: 67
End: 2017-06-22
Payer: COMMERCIAL

## 2017-06-22 VITALS
HEART RATE: 78 BPM | BODY MASS INDEX: 23.15 KG/M2 | WEIGHT: 137 LBS | SYSTOLIC BLOOD PRESSURE: 122 MMHG | DIASTOLIC BLOOD PRESSURE: 58 MMHG | TEMPERATURE: 98.4 F

## 2017-06-22 DIAGNOSIS — M25.819 SHOULDER IMPINGEMENT, UNSPECIFIED LATERALITY: Primary | ICD-10-CM

## 2017-06-22 DIAGNOSIS — E78.5 HYPERLIPIDEMIA WITH TARGET LDL LESS THAN 100: ICD-10-CM

## 2017-06-22 DIAGNOSIS — E11.9 TYPE 2 DIABETES MELLITUS WITHOUT COMPLICATION, WITHOUT LONG-TERM CURRENT USE OF INSULIN (H): ICD-10-CM

## 2017-06-22 DIAGNOSIS — I10 BENIGN ESSENTIAL HYPERTENSION: ICD-10-CM

## 2017-06-22 PROCEDURE — 36415 COLL VENOUS BLD VENIPUNCTURE: CPT | Performed by: FAMILY MEDICINE

## 2017-06-22 PROCEDURE — 80048 BASIC METABOLIC PNL TOTAL CA: CPT | Performed by: FAMILY MEDICINE

## 2017-06-22 PROCEDURE — 99214 OFFICE O/P EST MOD 30 MIN: CPT | Performed by: FAMILY MEDICINE

## 2017-06-22 PROCEDURE — 83721 ASSAY OF BLOOD LIPOPROTEIN: CPT | Performed by: FAMILY MEDICINE

## 2017-06-22 RX ORDER — TRAMADOL HYDROCHLORIDE 50 MG/1
50 TABLET ORAL EVERY 6 HOURS PRN
Qty: 30 TABLET | Refills: 0 | Status: SHIPPED | OUTPATIENT
Start: 2017-06-22 | End: 2017-11-20

## 2017-06-22 RX ORDER — TRAMADOL HYDROCHLORIDE 50 MG/1
TABLET ORAL
Qty: 30 TABLET | Refills: 0 | Status: SHIPPED | OUTPATIENT
Start: 2017-06-22 | End: 2017-11-20

## 2017-06-22 RX ORDER — TRAMADOL HYDROCHLORIDE 50 MG/1
50-100 TABLET ORAL EVERY 6 HOURS PRN
Qty: 30 TABLET | Refills: 0 | Status: SHIPPED | OUTPATIENT
Start: 2017-06-22 | End: 2017-10-03

## 2017-06-22 NOTE — LETTER
[facility]  [address] [place] [id number] [phone]      June 27, 2017      Devon Watts  [address] [address]          Dear Mr. Watts    The results of your recent lab tests were within normal limits. Enclosed is a copy of these results.  If you have any further questions or problems, please contact our office.  Results for orders placed or performed in visit on 06/22/17   LDL cholesterol direct   Result Value Ref Range    LDL Cholesterol Direct 77 <100 mg/dL   Basic metabolic panel  (Ca, Cl, CO2, Creat, Gluc, K, Na, BUN)   Result Value Ref Range    Sodium 144 133 - 144 mmol/L    Potassium 4.2 3.4 - 5.3 mmol/L    Chloride 109 94 - 109 mmol/L    Carbon Dioxide 24 20 - 32 mmol/L    Anion Gap 11 3 - 14 mmol/L    Glucose 57 (L) 70 - 99 mg/dL    Urea Nitrogen 16 7 - 30 mg/dL    Creatinine 1.10 0.66 - 1.25 mg/dL    GFR Estimate 67 >60 mL/min/1.7m2    GFR Estimate If Black 81 >60 mL/min/1.7m2    Calcium 9.8 8.5 - 10.1 mg/dL       Sincerely,      Nicole Magdaleno DO/sa

## 2017-06-22 NOTE — PATIENT INSTRUCTIONS
[facility]   Discharged by : Maricruz WESTBROOK Certified Medical Assistant (AAMA)   Paper scripts provided to patient : 3   If you have any questions regarding to your visit please contact your care team:   [facility] Hours Telephone Number   MATIAS Rangel Dr., PA-C    7am-7pm Monday - Thursday   7am-5pm Fridays  [phone]   (Appointment scheduling available 24/7)   RN Line   [phone] option 2       What options do I have for visits at the clinic other than the traditional office visit?   To expand how we care for you, many of our providers are utilizing electronic visits (e-visits) and telephone visits, when medically appropriate, for interactions with their patients rather than a visit in the clinic. We also offer nurse visits for many medical concerns. Just like any other service, we will bill your insurance company for this type of visit based on time spent on the phone with your provider. Not all insurance companies cover these visits. Please check with your medical insurance if this type of visit is covered. You will be responsible for any charges that are not paid by your insurance.     E-visits via [employer]: generally incur a $35.00 fee.     Telephone visits:   Time spent on the phone: *charged based on time that is spent on the phone in increments of 10 minutes. Estimated cost:   5-10 mins $30.00   11-20 mins. $59.00   21-30 mins. $85.00   Use [employer] (secure email communication and access to your chart) to send your primary care provider a message or make an appointment. Ask someone on your Team how to sign up for GoGuide.   For a Price Quote for your services, please call our Consumer Price Line at [phone].   As always, Thank you for trusting us with your health care needs!                [facility]:    Scheduling Appointments  [facility]  Call: [phone]    [facility]  Call: [phone]    [facility]  Call: [phone]

## 2017-06-22 NOTE — NURSING NOTE
"Chief Complaint   Patient presents with     Diabetes       Initial /58 (Cuff Size: Adult Regular)  Pulse 78  Temp 98.4  F (36.9  C) (Oral)  Wt 137 lb (62.1 kg)  BMI 23.15 kg/m2 Estimated body mass index is 23.15 kg/(m^2) as calculated from the following:    Height as of 4/18/17: 5' 4.5\" (1.638 m).    Weight as of this encounter: 137 lb (62.1 kg).  Medication Reconciliation: complete   Rena See SINDY Ivory      "

## 2017-06-22 NOTE — PROGRESS NOTES
SUBJECTIVE:                                                    Devon Watts is a 66 year old male who presents to clinic today for the following health issues:    Diabetes Follow-up    Patient is checking blood sugars: once daily.  Results are as follows:         am and sometimes pm - results are around 130 and below    Diabetic concerns: None     Symptoms of hypoglycemia (low blood sugar): none     Paresthesias (numbness or burning in feet) or sores: Yes once in a while     Date of last diabetic eye exam: December 2016    Metformin 1000 mg    Glipizide 10 mg  Lab Results   Component Value Date    A1C 6.4 04/18/2017    A1C 6.4 09/09/2016          Amount of exercise or physical activity: 6-7 days/week for an average of 15-30 minutes    Problems taking medications regularly: No    Medication side effects: none    Diet: regular (no restrictions)    Hyperlipidemia Follow-Up      Rate your low fat/cholesterol diet?: good    Taking statin?  Yes    Other lipid medications/supplements?:  none    Lisinopril 20 mg  LDL Cholesterol Direct   Date Value Ref Range Status   09/09/2016 86 <100 mg/dL Final     Comment:     Desirable:       <100 mg/dl        Hypertension Follow-up      Outpatient blood pressures are not being checked.    Low Salt Diet: no added salt    Lipitor 10 mg     Impingement Syndrome  Impingement syndrome of both shoulders with arthritis as well, which he takes 30 tramadol a month for for break through pain. No up to date X-rays. He has gone to physical therapy. He used to play field hockey as a kid but does not recall any major injury or trauma.     Elevated Creatinine  Patient notes an elevated creatinine and was told to drink a lot of water as a result.     Of Note: He will be traveling to [facility] in early July to visit sick family members and will return 3 months later.     Problem list and histories reviewed & adjusted, as indicated.  Additional history: as documented    Patient Active Problem List    Diagnosis     Type 2 diabetes mellitus without complication (H)     Hyperlipidemia with target LDL less than 100     Benign essential hypertension     Impingement syndrome of both shoulders     History reviewed. No pertinent surgical history.    Social History   Substance Use Topics     Smoking status: Never Smoker     Smokeless tobacco: Not on file     Alcohol use No     Family History   Problem Relation Age of Onset     Hypertension Son      DIABETES Son      Breast Cancer No family hx of      Colon Cancer No family hx of      Prostate Cancer No family hx of      Other Cancer No family hx of          BP Readings from Last 3 Encounters:   06/22/17 122/58   04/18/17 116/62   01/13/17 128/64    Wt Readings from Last 3 Encounters:   06/22/17 62.1 kg (137 lb)   04/18/17 60.1 kg (132 lb 8 oz)   01/13/17 63 kg (139 lb)        Reviewed and updated as needed this visit by clinical staff  Tobacco  Allergies  Meds  Med Hx  Surg Hx  Fam Hx  Soc Hx      Reviewed and updated as needed this visit by Provider         ROS:  Constitutional, HEENT, cardiovascular, pulmonary, GI, , musculoskeletal, neuro, skin, endocrine and psych systems are negative, except as otherwise noted.    This document serves as a record of the services and decisions personally performed and made by Nicole Magdaleno DO. It was created on her/his behalf by Autumn Obregon, a trained medical scribe. The creation of this document is based on the provider's statements to the medical scribe.  Autumn Obregon June 22, 2017 1:27 PM    OBJECTIVE:                                                    /58 (Cuff Size: Adult Regular)  Pulse 78  Temp 98.4  F (36.9  C) (Oral)  Wt 62.1 kg (137 lb)  BMI 23.15 kg/m2  Body mass index is 23.15 kg/(m^2).  GENERAL: healthy, alert and no distress  HENT: ear canals and TM's normal, nose and mouth without ulcers or lesions  NECK: no adenopathy, no asymmetry, masses, or scars and thyroid normal to palpation  RESP: lungs  clear to auscultation - no rales, rhonchi or wheezes  CV: regular rate and rhythm, normal S1 S2, no S3 or S4, no murmur, click or rub, no peripheral edema and peripheral pulses strong  MS: slight tenderness of anterior shoulder bilaterally, crepitus in shoulders bilaterally, no gross musculoskeletal defects noted, no edema  PSYCH: mentation appears normal, affect normal/bright    Diagnostic Test Results:  No results found for this or any previous visit (from the past 24 hour(s)).     ASSESSMENT/PLAN:                                                    Diabetes Type II, A1c Controlled, non-insulin dependent   Associated with the following complications    Renal Complications:  None    Ophthalmologic Complications: None    Neurologic Complications: None    Peripheral Vascular Complications:  None    Other: None   Plan:  No changes in the patient's current treatment plan    Hyperlipidemia; controlled   Plan:  No changes in the patient's current treatment plan    Hypertension; controlled   Associated with the following complications:    None   Plan:  No changes in the patient's current treatment plan        ICD-10-CM    1. Shoulder impingement, unspecified laterality M75.40 traMADol (ULTRAM) 50 MG tablet     traMADol (ULTRAM) 50 MG tablet     traMADol (ULTRAM) 50 MG tablet   2. Hyperlipidemia with target LDL less than 100 E78.5 LDL cholesterol direct   3. Type 2 diabetes mellitus without complication, without long-term current use of insulin (H) E11.9 Basic metabolic panel  (Ca, Cl, CO2, Creat, Gluc, K, Na, BUN)   4. Benign essential hypertension I10 Basic metabolic panel  (Ca, Cl, CO2, Creat, Gluc, K, Na, BUN)     He has a diagnosis of impingement syndrome of shoulders but I suspect arthritis and we will get a confirmatory xray upon his return from [facility] in 3 months. The current medical regimen is effective;  continue present plan and medications.     There are no Patient Instructions on file for this visit.    Nicole  DO Sharla  [facility]    This document serves as a record of the services and decisions personally performed and made by Nicole Magdaleno DO. It was created on her behalf by Autumn Obregon, a trained medical scribe. The creation of this document is based on the provider's statements to the medical scribe.  Autumn Obregon June 22, 2017 1:26 PM

## 2017-06-23 LAB
ANION GAP SERPL CALCULATED.3IONS-SCNC: 11 MMOL/L (ref 3–14)
BUN SERPL-MCNC: 16 MG/DL (ref 7–30)
CALCIUM SERPL-MCNC: 9.8 MG/DL (ref 8.5–10.1)
CHLORIDE SERPL-SCNC: 109 MMOL/L (ref 94–109)
CO2 SERPL-SCNC: 24 MMOL/L (ref 20–32)
CREAT SERPL-MCNC: 1.1 MG/DL (ref 0.66–1.25)
GFR SERPL CREATININE-BSD FRML MDRD: 67 ML/MIN/1.7M2
GLUCOSE SERPL-MCNC: 57 MG/DL (ref 70–99)
LDLC SERPL DIRECT ASSAY-MCNC: 77 MG/DL
POTASSIUM SERPL-SCNC: 4.2 MMOL/L (ref 3.4–5.3)
SODIUM SERPL-SCNC: 144 MMOL/L (ref 133–144)

## 2017-06-26 ENCOUNTER — TELEPHONE (OUTPATIENT)
Dept: GASTROENTEROLOGY | Facility: OUTPATIENT CENTER | Age: 67
End: 2017-06-26

## 2017-06-26 DIAGNOSIS — Z12.11 ENCOUNTER FOR SCREENING COLONOSCOPY: Primary | ICD-10-CM

## 2017-06-26 NOTE — TELEPHONE ENCOUNTER
Patient taking any blood thinners ?  Aspirin    Heart disease ? denies    Lung disease ? denies       Sleep apnea ? denies    Diabetic ? Type 2    Kidney disease ? denies    Dialysis ? n/a    Electronic implanted medical devices ? denies    Are you taking any narcotic pain medication ? Tramadol prn  What is your daily dosage ?    PTSD ? n/a    Prep instructions reviewed with patient ? Instructions,  policy, conscious sedation plan reviewed. Advised patient to have someone stay with him post exam    Ph[facility]acy : [facility]    Indication for procedure : screening colonoscopy    Referring provider : Self

## 2017-07-07 ENCOUNTER — TRANSFERRED RECORDS (OUTPATIENT)
Dept: HEALTH INFORMATION MANAGEMENT | Facility: CLINIC | Age: 67
End: 2017-07-07

## 2017-07-07 ENCOUNTER — DOCUMENTATION ONLY (OUTPATIENT)
Dept: GASTROENTEROLOGY | Facility: OUTPATIENT CENTER | Age: 67
End: 2017-07-07

## 2017-07-11 LAB — COPATH REPORT: NORMAL

## 2017-10-02 ENCOUNTER — HOSPITAL ENCOUNTER (OUTPATIENT)
Dept: BEHAVIORAL HEALTH | Facility: CLINIC | Age: 67
Discharge: HOME | End: 2017-10-02
Attending: SOCIAL WORKER | Admitting: SOCIAL WORKER
Payer: COMMERCIAL

## 2017-10-02 PROCEDURE — H0001 ALCOHOL AND/OR DRUG ASSESS: HCPCS

## 2017-10-02 ASSESSMENT — PAIN SCALES - GENERAL: PAINLEVEL: MODERATE PAIN (4)

## 2017-10-02 ASSESSMENT — PATIENT HEALTH QUESTIONNAIRE - PHQ9: SUM OF ALL RESPONSES TO PHQ QUESTIONS 1-9: 0

## 2017-10-02 ASSESSMENT — ANXIETY QUESTIONNAIRES
3. WORRYING TOO MUCH ABOUT DIFFERENT THINGS: NOT AT ALL
6. BECOMING EASILY ANNOYED OR IRRITABLE: NOT AT ALL
1. FEELING NERVOUS, ANXIOUS, OR ON EDGE: NOT AT ALL
GAD7 TOTAL SCORE: 0
5. BEING SO RESTLESS THAT IT IS HARD TO SIT STILL: NOT AT ALL
4. TROUBLE RELAXING: NOT AT ALL
7. FEELING AFRAID AS IF SOMETHING AWFUL MIGHT HAPPEN: NOT AT ALL
2. NOT BEING ABLE TO STOP OR CONTROL WORRYING: NOT AT ALL
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
## 2017-10-02 NOTE — PROGRESS NOTES
Rule 25 Assessment  Background Information   1. Date of Assessment Request  2. Date of Assessment  10/3/2017   3. Date Service Authorized     4.   ERNA [facility]gs     5.  Phone Number   [phone]   6. Referent  Self 7. Assessment Site  [facility]     8. Client Name   Devon Watts 9. Date of Birth  1950 Age  66 year old 10. Gender  male  11. PMI/ Insurance No.  [id number]   12. Client's Primary Language:  English 13. Do you require special accommodations, such as an  or assistance with written material? No   14. Current Address: [address] [place] [address]   15. Client Phone Numbers: [phone] (home)        16. Tell me what has happened to bring you here today.    Devon came to [facility] at [address] [place] [address] for evaluation of possible chemical dependency. The reason for the evaluation was that he was referred by the [place] [employer]. 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 However, patient said the Board has now required that he have a CD eval even though it is not indicated in the August 2017 Stipulation and Consent Order from the [place] Board [place].     TODAY, 10/3/17, CORI ACUNA AT THE BOARD OF NURSING CONFIRMED THAT PATIENT DID NOT NEED A CD EVAL, BUT RATHER A MENTAL HEALTH EVALUATION.      17. Have you had other rule 25 assessments?     No    DIMENSION I - Acute Intoxication /Withdrawal Potential   1. Chemical use most recent 12 months outside a facility and other significant use history (client self-report)              X = Primary Drug Used   Age of First Use Most Recent Pattern of Use and Duration   Need enough information to show pattern (both frequency and amounts) and to show tolerance for each chemical that has a diagnosis   Date of last use and time, if needed   Withdrawal Potential? Requiring special care Method of use  (oral, smoked, snort,  IV, etc)      Alcohol     no             Marijuana/  Hashish   N/A           Cocaine/Crack     N/A           Meth/  Amphetamines   N/A           Heroin     N/A           Other Opiates/  Synthetics   N/A  ? Narco 30 tablets for twisted hip, he only used a few pills, and quit taking it because of side effects. His wife also used some of it when she fell, there are still pills left.     Tramadol age 64 - 66 50 mg prn, usually one a day or less 10/1/17  50 mg none oral      Inhalants     N/A           Benzodiazepines     N/A           Hallucinogens     N/A           Barbiturates/  Sedatives/  Hypnotics N/A           Over-the-Counter Drugs   N/A           Other     N/A           Nicotine     N/A          2. Do you use greater amounts of alcohol/other drugs to feel intoxicated or achieve the desired effect?  No.  Or use the same amount and get less of an effect?  No.  Example: NA    3A. Have you ever been to detox?     No    3B. When was the first time?     NA    3C. How many times since then?     NA    3D. Date of most recent detox:     NA    4.  Withdrawal symptoms: Have you had any of the following withdrawal symptoms?  Past 12 months Recent (past 30 days)   None None     's Visual Observations and Symptoms: No visible withdrawal symptoms at this time    Based on the above information, is withdrawal likely to require attention as part of treatment participation?  No    Dimension I Ratings   Acute intoxication/Withdrawal potential - The placing authority must use the criteria in Dimension I to determine a client s acute intoxication and withdrawal potential.    RISK DESCRIPTIONS - Severity ratin Client displays full functioning with good ability to tolerate and cope with withdrawal discomfort. No signs or symptoms of intoxication or withdrawal or resolving signs or symptoms.    REASONS SEVERITY WAS ASSIGNED (What about the amount of the person s use and date of most recent use and history of withdrawal  problems suggests the potential of withdrawal symptoms requiring professional assistance?        At the time of the evaluation his UA was negative, his ITA was .000. He did not identify any current symptoms of alcohol or drug withdrawal. He does not identify any history of alcohol or drug use that would require detox. He has never been in detox.          DIMENSION II - Biomedical Complications and Conditions   1. Do you have any current health/medical conditions?(Include any infectious diseases, allergies, or chronic or acute pain, history of chronic conditions)       Yes.   Illnesses/Medical Conditions you are receiving care for: sore shoulders from manual labor which he manages with 50 mg rx'ed Tramadol, usually 1 a day or less.     2. Do you have a health care provider? When was your most recent appointment? What concerns were identified?     Dr. Breana BANKS Kevil    3. If indicated by answers to items 1 or 2: How do you deal with these concerns? Is that working for you? If you are not receiving care for this problem, why not?      He feels he is getting his needs addressed.    4A. List current medication(s) including over-the-counter or herbal supplements--including pain management:     Metformin 100 mg 2x daily  Glipizide     20 mg 2x daily  Lisinopril    10 mg 1x  Daily  Tramadol   50 mg  Prn (usually 1x a day or less)        4B. Do you follow current medical recommendations/take medications as prescribed?     Yes    4C. When did you last take your medication?     10/1/17    5. Has a health care provider/healer ever recommended that you reduce or quit alcohol/drug use?     No    6. Are you pregnant?     No    7. Have you had any injuries, assaults/violence towards you, accidents, health related issues, overdose(s) or hospitalizations related to your use of alcohol or other drugs:     No    8. Do you have any specific physical needs/accommodations? No    Dimension II Ratings   Biomedical Conditions  and Complications - The placing authority must use the criteria in Dimension II to determine a client s biomedical conditions and complications.   RISK DESCRIPTIONS - Severity ratin Client displays full functioning with good ability to cope with physical discomfort.    REASONS SEVERITY WAS ASSIGNED (What physical/medical problems does this person have that would inhibit his or her ability to participate in treatment? What issues does he or she have that require assistance to address?)    Client denies having any chronic biomedical conditions that would interfere with CD treatment or any CD/Drug awareness classes. He does endorse having the following medical conditions: sore shoulders from manual work. He is currently on the medications as listed above. He does have insurance and does have a PCP.         DIMENSION III - Emotional, Behavioral, Cognitive Conditions and Complications   1. (Optional) Tell me what it was like growing up in your family. (substance use, mental health, discipline, abuse, support)     He grew up in [place], happy childhood, raised mainly by grandparents. Traditionally in his [place], the paternal grandparents raise their first grandson. He lived with them from  from [age]  until both grandparents passed away. His grandfather  when he was 10 his grandmother passed when he was 11. His father  in . At 11 he went to [facility] to live with his uncle.  [place]. Then went to [employer] where he got an BS in Agriculture and a MA in economics.   He was raised by: paternal grandparents,   Siblings:  abrother 55, and a sister 59  Family CD hx: none  Family MI hx: none  Abuse: none  Supported?: 100%    2. When was the last time that you had significant problems...  A. with feeling very trapped, lonely, sad, blue, depressed or hopeless  about the future? Never    B. with sleep trouble, such as bad dreams, sleeping restlessly, or falling  asleep during the  day? Past Month very little, occasionally trouble staying asleep, takes Tramadol for pain.     C. with feeling very anxious, nervous, tense, scared, panicked, or like  something bad was going to happen? 2 - 12 months ago  Upset with the issues happening at the Board of Nursing.     D. with becoming very distressed and upset when something reminded  you of the past? 2 - 12 months ago see above    E. with thinking about ending your life or committing suicide? Never    3. When was the last time that you did the following things two or more times?  A. Lied or conned to get things you wanted or to avoid having to do  something? Never    B. Had a hard time paying attention at school, work, or home? Never    C. Had a hard time listening to instructions at school, work, or home? Never    D. Were a bully or threatened other people? Never    E. Started physical fights with other people? Never    Note: These questions are from the Global Appraisal of Individual Needs--Short Screener. Any item marked  past month  or  2 to 12 months ago  will be scored with a severity rating of at least 2.     For each item that has occurred in the past month or past year ask follow up questions to determine how often the person has felt this way or has the behavior occurred? How recently? How has it affected their daily living? And, whether they were using or in withdrawal at the time?    See above.     4A. If the person has answered item 2E with  in the past year  or  the past month , ask about frequency and history of suicide in the family or someone close and whether they were under the influence.     NA    Any history of suicide in your family? Or someone close to you?     NA    4B. If the person answered item 2E  in the past month  ask about  intent, plan, means and access and any other follow-up information  to determine imminent risk. Document any actions taken to intervene  on any identified imminent risk.      Devon denies a history  of any past or current suicidal ideation, attempts or self injurious behavior.      5A. Have you ever been diagnosed with a mental health problem?     No    5B. Are you receiving care for any mental health issues? If yes, what is the focus of that care or treatment?  Are you satisfied with the service? Most recent appointment?  How has it been helpful?     NA     6. Have you been prescribed medications for emotional/psychological problems?     NA    7. Does your MH provider know about your use?     NA    8A. Have you ever been verbally, emotionally, physically or sexually abused?      No     Follow up questions to learn current risk, continuing emotional impact.      NA    8B. Have you received counseling for abuse?      N/A    9. Have you ever experienced or been part of a group that experienced community violence, historical trauma, rape or assault?     No    10A. [place]:    No    11. Do you have problems with any of the following things in your daily life?    No    Note: If the person has any of the above problems, follow up with items 12, 13, and 14. If none of the issues in item 11 are a problem for the person, skip to item 15.    na    12. Have you been diagnosed with traumatic brain injury or Alzheimer s?  No    13. If the answer to #12 is no, ask the following questions:    Have you ever hit your head or been hit on the head? No    Were you ever seen in the Emergency Room, hospital or by a doctor because of an injury to your head? No    Have you had any significant illness that affected your brain (brain tumor, meningitis, West Nile Virus, stroke or seizure, heart attack, near drowning or near suffocation)? No    14. If the answer to #12 is yes, ask if any of the problems identified in #11 occurred since the head injury or loss of oxygen. NA    15A. Highest grade of school completed:     College graduate    15B. Do you have a learning disability? No    15C. Did you ever have tutoring in Math or English? No       15D. Have you ever been diagnosed with Fetal Alcohol Effects or Fetal Alcohol Syndrome? No    16. If yes to item 15 B, C, or D: How has this affected your use or been affected by your use?     NA    Dimension III Ratings   Emotional/Behavioral/Cognitive - The placing authority must use the criteria in Dimension III to determine a client s emotional, behavioral, and cognitive conditions and complications.   RISK DESCRIPTIONS - Severity ratin Client has good impulse control and coping skills and presents no risk of harm to self or others. Client functions in    REASONS SEVERITY WAS ASSIGNED - What current issues might with thinking, feelings or behavior pose barriers to participation in a treatment program? What coping skills or other assets does the person have to offset those issues? Are these problems that can be initially accommodated by a treatment provider? If not, what specialized skills or attributes must a provider have?    On the date of evaluation his PHQ-9 was 0 of 27,his DAVY-7 was 0 of 21. He reported never being diagnosed with any MI issues. He is currently on the medications as listed above, and is not seeing a therapist.      Devon denies a history of any past or current suicidal ideation, attempts or self injurious behavior.      He grew up in [facility], happy childhood, raised mainly by paternal grandparents. Traditionally in his [place], the paternal grandparents raise their first grandson. He lived with them from  from [age]  until both grandparents passed away. His grandfather  when he was 10 his grandmother passed when he was 11. His father  in . At 11 he went to [facility] to live with his uncle.  He moved to the [facility] [place]. Then went to [employer] where he got an BS in Agriculture and a MA in economics.   He was raised by: paternal grandparents,   Siblings:  A brother 55, and a sister 59  Family CD hx: none  Family MI hx: none  Abuse: none  Supported?: 100%          DIMENSION IV - Readiness for Change   1. You ve told me what brought you here today. (first section) What do you think the problem really is?     Devon came to [facility] at [facility] [address] for evaluation of possible chemical dependency. The reason for the evaluation was that he was referred by the [place] Board of [employer]. 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 However, patient said the Board has now required that he have a CD eval even though it is not indicated in the August 2017 Stipulation and Consent Order from the [place] Board of [place].     2. Tell me how things are going. Ask enough questions to determine whether the person has use related problems or assets that can be built upon in the following areas: Family/friends/relationships; Legal; Financial; Emotional; Educational; Recreational/ leisure; Vocational/employment; Living arrangements (DSM)      Life overall is going well except for the false alligations that have lead to supervision by the [place] Board [employer].     3. What activities have you engaged in when using alcohol/other drugs that could be hazardous to you or others (i.e. driving a car/motorcycle/boat, operating machinery, unsafe sex, sharing needles for drugs or tattoos, etc     NA.    4. How much time do you spend getting, using or getting over using alcohol or drugs? (DSM)     NA    5. Reasons for drinking/drug use (Use the space below to record answers. It may not be necessary to ask each item.)  Like the feeling No   Trying to forget problems No   To cope with stress No   To relieve physical pain No   To cope with anxiety No   To cope with depression No   To relax or unwind No   Makes it easier to talk with people No   Partner encourages use No   Most friends drink or use No   To cope with family problems No   Afraid of withdrawal symptoms/to feel better N/A   Other (specify)  N/A     A. What concerns other people about  your alcohol or drug use/Has anyone told you that you use too much? What did they say? (DSM)     No one.    B. What did you think about that/ do you think you have a problem with alcohol or drug use?     I don't use anything so I've never had a problem.     6. What changes are you willing to make? What substance are you willing to stop using? How are you going to do that? Have you tried that before? What interfered with your success with that goal?      No changes are needed his life is going well except for the [place] Bd [employer] involvement.    7. What would be helpful to you in making this change?     No changes are needed his life is going well except for the [place] [employer] involvement.      Dimension IV Ratings   Readiness for Change - The placing authority must use the criteria in Dimension IV to determine a client s readiness for change.   RISK DESCRIPTIONS - Severity ratin Client is motivated with active reinforcement, to explore treatment and strategies for change, but ambivalent about illness or need for change.    REASONS SEVERITY WAS ASSIGNED - (What information did the person provide that supports your assessment of his or her readiness to change? How aware is the person of problems caused by continued use? How willing is she or he to make changes? What does the person feel would be helpful? What has the person been able to do without help?)      Devon was cooperative with the evaluation process. He did not identify any history of using or abusing alcohol or abusing mood altering drugs and has not intention of doing so in the future. He came for the evaluation solely because he understood that he was required by the [place] BD of Nursing of nursing to get a CD eval. It appears that he misundertstood the requirement of the Board who required that he have Mental Health Assessment not a CD eval.        DIMENSION V - Relapse, Continued Use, and Continued Problem Potential   1. In what ways have you tried to  control, cut-down or quit your use? If you have had periods of sobriety, how did you accomplish that? What was helpful? What happened to prevent you from continuing your sobriety? (DSM)     He has never used alcohol or abused mood altering drugs so there was never a need to control it. He has been prescribed Tramadol for the last 2 years which he uses as prescribed.     2. Have you experienced cravings? If yes, ask follow up questions to determine if the person recognizes triggers and if the person has had any success in dealing with them.     He rates his current cravings as 0 on a (0-10) scale, 0 being no cravings at all. In the last 30 days his cravings averaged 0.    3. Have you been treated for alcohol/other drug abuse/dependence?     No    4. Support group participation: Have you/do you attend support group meetings to reduce/stop your alcohol/drug use? How recently? What was your experience? Are you willing to restart? If the person has not participated, is he or she willing?     He has never had a need to attend to attend any sober support groups and never has.     5. What would assist you in staying sober/straight?     No changes are needed his life is going well except for the [place] Bd [employer] involvement.    Dimension V Ratings   Relapse/Continued Use/Continued problem potential - The placing authority must use the criteria in Dimension V to determine a client s relapse, continued use, and continued problem potential.   RISK DESCRIPTIONS - Severity ratin Client recognizes risk well and is able to manage potential problems.    REASONS SEVERITY WAS ASSIGNED - (What information did the person provide that indicates his or her understanding of relapse issues? What about the person s experience indicates how prone he or she is to relapse? What coping skills does the person have that decrease relapse potential?)      There are no indications that Devon has ever had a problem with substance abuse or any  mental health issues that would increase his risk of substance abuse. He has no identified  family history of addiction or mental illness.          DIMENSION VI - Recovery Environment   1. Are you employed/attending school? Tell me about that.     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     2A. Describe a typical day; evening for you. Work, school, social, leisure, volunteer, spiritual practices. Include time spent obtaining, using, recovering from drugs or alcohol. (DSM)     He usually works 10:3- until 7 AM, rotates week ends. Besides work and sleep, during the summer, he goes to gym occasionally with his boys, he walks at a Mall in [facility], very active in his [employer], attends every Sunday. [facility].     2B. How often do you spend more time than you planned using or use more than you planned? (DSM)     Never.    3. How important is using to your social connections? Do many of your family or friends use?     He has many friends, he is not aware of any of them using alcohol or drugs.    4A. Are you currently in a significant relationship?     Yes.  4B. How long?  12/27/1992. She is from [facility].    4C. Sexual Orientation:     Heterosexual    5A. Who do you live with?      Wife and children    5B. Tell me about their alcohol/drug use and mental health issues.     NA    5C. Are you concerned for your safety there? No    5D. Are you concerned about the safety of anyone else who lives with you? No    6A. Do you have children who live with you?     Yes.  (Ask follow-up questions to determine the person s relationship and responsibility, both legal and care giving; and what arrangements are made for supervision for the children when the person is not available.)     Radha  Son 19    6B. Do you have children who do not live with you?     Yes.  (Ask follow up questions to learn where the children are,  who has custody and what the person s relationship and responsibility is with these children and what hopes the person has for his or her future with these children.)     Delio 21 [place]    7A. Who supports you in making changes in your alcohol or drug use? What are they willing to do to support you? Who is upset or angry about you making changes in your alcohol or drug use? How big a problem is this for you?      family    7B. This table is provided to record information about the person s relationships and available support It is not necessary to ask each item; only to get a comprehensive picture of their support system.  How often can you count on the following people when you need someone?   Partner / Spouse Always supportive   Parent(s)/Aunt(s)/Uncle(s)/Grandparents N/A   Sibling(s)/Cousin(s) Always supportive   Child(carmencita) Always supportive   Other relative(s) Always supportive   Friend(s)/neighbor(s) Always supportive   Child(carmencita) s father(s)/mother(s) Always supportive   Support group member(s) N/A   Community of sp members Always supportive   /counselor/therapist/healer N/A   Other (specify) N/A     8A. What is your current living situation?     5 A    8B. What is your long term plan for where you will be living?     5 A    8C. Tell me about your living environment/neighborhood? Ask enough follow up questions to determine safety, criminal activity, availability of alcohol and drugs, supportive or antagonistic to the person making changes.      Safe, very quiet.     9. Criminal justice history: Gather current/recent history and any significant history related to substance use--Arrests? Convictions? Circumstances? Alcohol or drug involvement? Sentences? Still on probation or parole? Expectations of the court? Current court order? Any sex offenses - lifetime? What level? (DSM)    None    10. What obstacles exist to participating in treatment? (Time off work, childcare, funding, transportation,  pending shelter time, living situation)     NA    Dimension VI Ratings   Recovery environment - The placing authority must use the criteria in Dimension VI to determine a client s recovery environment.   RISK DESCRIPTIONS - Severity ratin Client is engaged in structured, meaningful activity and has a supportive significant other, family, and living environment.    REASONS SEVERITY WAS ASSIGNED - (What support does the person have for making changes? What structure/stability does the person have in his or her daily life that will increase the likelihood that changes can be sustained? What problems exist in the person s environment that will jeopardize getting/staying clean and sober?)     Devon zacarias is from [facility] and has a very intact supportive non-using family. 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 He lives with his supportive wife of almost 25 years and his 19 year old son. He also has a 21 year old son who is studying at [employer]. He is very committed to his Christianity, and attends weekly even on the days when he works the evening shift.          Client Choice/Exceptions   Would you like services specific to language, age, gender, culture, Sabianist preference, race, ethnicity, sexual orientation or disability?  No    What particular treatment choices and options would you like to have? NA    Do you have a preference for a particular treatment program? NA    Criteria for Diagnosis     Criteria for Diagnosis  DSM-5 Criteria for Substance Use Disorder  Instructions: Determine whether the client currently meets the criteria for Substance Use Disorder using the diagnostic criteria in the DSM-V pp.481-586. Current means during the most recent 12 months outside a facility that controls access to substances    Category of Substance Severity (ICD-10 Code / DSM 5 Code)     Alcohol Use  Disorder NA   Cannabis Use Disorder NA   Hallucinogen Use Disorder NA   Inhalant Use Disorder NA   Opioid Use Disorder NA   Sedative, Hypnotic, or Anxiolytic Use Disorder NA   Stimulant Related Disorder NA   Tobacco Use Disorder NA   Other (or unknown) Substance Use Disorder NA       Collateral Contact Summary   Number of contacts made: 4  (Mable Gabriel, his wife and his EMR as he is a pt in the  system, I did review the Stipulation and Consent Order dated August 2017).    Contact with referring person:  Yes, Mable Gabriel.    If court related records were reviewed, summarize here: NA    Information from collateral contacts supported/largely agreed with information from the client and associated risk ratings.      Rule 25 Assessment Summary and Plan   's Recommendation    1) Pt is to follow the requirements of the [facility] of Nursing. They are requiring that he have a Mental Health Assessment not a CD assessment. I have confirmed this with Mable Gabriel at the  of Nursing and discussed this directly with Devon. I told him to look at the list he was given and identify a therapist who could do a mental health assessment.  2) Pt to be judicious in use of Tramadol as it has the potential to become addicting.    3) No other recommendations are indicated as he does not meet any criteria for a substance use disorder.         Collateral Contacts     Name:    Radhika   Relationship:    Wife of almost 25 years   Phone Number:    [phone] Releases:    Yes     Radhika said she has known him sense college, has rarely seen him drink, and then only one drink. She has never seen him use any illicit drugs. She said he may use one Tramadol a day or less which is less than rx'ed. She does not think he has any CD issues.       Collateral Contacts     Name:    Mable Gabriel   Relationship:    [facility]   Phone Number:    [phone]   Releases:    Yes     Mable said she conferred with Mable Lerma at the Board and neither one  had any concerns about his possible substance use. She said they did not require a substance abue eval but did require a Mental Health Assessment.     I did review his EMR within the [facility] system and did not see any evidence or concerns about his use of substance abuse.    I did review the Stipulation and Consent Order from the [place] BD of Nursing dated August 2017.    ollateral Contacts      A problematic pattern of alcohol/drug use leading to clinically significant impairment or distress, as manifested by at least two of the following, occurring within a 12-month period:     Devon did not meet any criteria for substance abuse.     Specify if: In early remission:  After full criteria for alcohol/drug use disorder were previously met, none of the criteria for alcohol/drug use disorder have been met for at least 3 months but for less than 12 months (with the exception that Criterion A4,  Craving or a strong desire or urge to use alcohol/drug  may be met).     In sustained remission:   After full criteria for alcohol use disorder were previously met, non of the criteria for alcohol/drug use disorder have been met at any time during a period of 12 months or longer (with the exception that Criterion A4,  Craving or strong desire or urge to use alcohol/drug  may be met).   Specify if:   This additional specifier is used if the individual is in an environment where access to alcohol is restricted.    Mild: Presence of 2-3 symptoms    Moderate: Presence of 4-5 symptoms    Severe: Presence of 6 or more symptoms

## 2017-10-03 ENCOUNTER — TELEPHONE (OUTPATIENT)
Dept: FAMILY MEDICINE | Facility: CLINIC | Age: 67
End: 2017-10-03

## 2017-10-03 DIAGNOSIS — M25.819 SHOULDER IMPINGEMENT, UNSPECIFIED LATERALITY: ICD-10-CM

## 2017-10-03 ASSESSMENT — ANXIETY QUESTIONNAIRES: GAD7 TOTAL SCORE: 0

## 2017-10-03 NOTE — TELEPHONE ENCOUNTER
traMADol       Last Written Prescription Date:  6/22/17  Last Fill Quantity: 30,   # refills: 0  Last Office Visit with [facility] prescribing provider: 6/22/17  Future Office visit:       Routing refill request to provider for review/approval because:  Drug not on the [facility] refill protocol or controlled substance

## 2017-10-03 NOTE — TELEPHONE ENCOUNTER
Called and spoke with Evgeny. He is unsure why he was referred to chemical dependency because he doesn't think there are any issues. He reports to Evgeny that he uses tramadol once per day. He is wondering if Dr. Magdaleno has any concerns about patient's chemical use?    Thanks,  Arnaldo Oneill RN

## 2017-10-03 NOTE — TELEPHONE ENCOUNTER
Reason for Call:  Other chemical dependancy    Detailed comments: Evgeny, from [facility], patient was he seen yesterday for an assessment, would like to discuss patient in more detail, was referred by [place] Board of [employer] for compliance issues.    Phone Number Patient can be reached at: Other phone number:  Evgeny Alvarenga, Psychotherapist Supervisor at [facility], [phone]    Best Time: any    Can we leave a detailed message on this number? YES    Call taken on 10/3/2017 at 10:15 AM by Karen Rizvi

## 2017-10-04 NOTE — PROGRESS NOTES
CHEMICAL DEPENDENCY ASSESSMENT      PATIENT'S ADDRESS:  [address] [place] [address].   PHONE NUMBER:  [phone].   STATISTICS:  YOB: 1950.  Age:  66.  Marital status:  .  Sex:  Male.   DATE OF ASSESSMENT:  10/02/2017.   REFERRAL SOURCE:  Mable Gabriel, [place] Board of [employer], telephone number [phone], fax [phone].      REASON FOR ASSESSMENT:  There is a history of numerous concerns by the Board of Nursing regarding patient's past performance behavior going back to 05/06/2009.    There was a mediation on 08/11/2017.  A Stipulation and consent order was developed with specific requirements for Devon, which are all contained in that stipulation and consent order.  Devon understood that he was to have a chemical dependency assessment.  Upon my meeting with him, I did review the stipulation and consent order, and pointed out to him that there was no indication in the order that mentioned any concern about his possible substance abuse nor did it require that he have a substance abuse assessment, but rather that he have a mental health assessment.  Devon was insistent that the board wanted him to have a substance abuse assessment and hence we went forth with the assessment.      OUTPATIENT HEALTH ASSESSMENT:  On the date of assessment, blood pressure was 156/69, heart rate was 106, BMI was 21.19.  He currently identified having some ongoing shoulder pains for which he takes 50 mg of Tylenol as needed, which is about daily but now and then he skips a day.  He identifies having diabetes, he is currently on metformin 1000 mg twice a day, Glipizide 20 mg twice a day, lisinopril 10 mg a day for high blood pressure and then tramadol 50 mg a day, which he does not always take for pain in shoulders pain.      HISTORY OF PREVIOUS TREATMENT AND COUNSELING:  He has had no previous chemical dependency treatment, counseling, detox or 12-step involvement.      HISTORY OF ALCOHOL AND  DRUG USE:  Denies any history of any alcohol use or other illicit drug use.  States around 2015, he was getting 30 Norco tablets for a twisted hip.  He states he only took a few of the pills.  States he has been on tramadol from about the age of 64 to 66, 50 mg p.r.n., usually 1 a day or less.  Last use was 10/01/2017 at 50 mg.  He has no history of smoking.      HISTORY OF PREVIOUS TREATMENT AND COUNSELING:  He has had no previous chemical dependency treatment counseling, 12-step involvement, detoxing or CD treatment.      SUMMARY OF CHEMICAL DEPENDENCY SYMPTOMS ACKNOWLEDGED BY THE PATIENT:  He does not identify any of the 11 DSM-V criteria for diagnosis of substance dependence.      SUMMARY OF COLLATERAL DATA:  Collateral data was gathered from his wife, Radhika, who has known him since college and will be  to him 25 years this December.  She states during their entire relationship she has possibly seen him drink a total of 10 drinks and that would be 1 drink at a time.  She could not remember the last time she saw him drink.  She has never seen him use any illicit mood-altering drugs.  She states to the best of her knowledge, he uses the tramadol 1 a day or less as needed for pain and has never abused that.  She has never seen any symptoms of substance abuse, had any concerns about his substance use.      I did review his EMR, as he is a patient within the [facility] system, and did not see any evidence of substance abuse or dependency and his EMR.      I did review his stipulation and consent order of 08/2017 and the order contains no indications of concern about substance abuse or dependency nor does it require that he have a substance abuse assessment.      I did receive a telephone call from Mable Gabriel at the [place] Board of [employer], who also conferred with Mable Lerma at the Board of Nursing.  Neither Mable Gabriel nor Mable Lerma had any concerns about Devon's use of alcohol or mood-altering  substances and did not feel he needed to have a substance abuse assessment.  They stated the expectation of the board is that he would have a mental health assessment.      MENTAL HEALTH STATUS:  On the date of assessment, he appeared his stated age.  He was well groomed, maintained eye contact at the examiner.  Speech rate regular.  Speech volume regular.  Speech quality fluid.  Perception reality based.  Memory intact.  Judgment intact.  Insight intact.  Oriented to time, place, person and situation.  Thoughts were logical, evidenced no hallucinations.  General behavior tone was cooperative.  Evidenced no psychomotor problems.  Gait:  No problem.  Mood:  Normal, affect congruent and appropriate.  Counselor notes a very proper, polite gentleman.      VULNERABLE ADULT ASSESSMENT:  Devon is not a functional vulnerable adult according to [place] Statute 626.5572, subdivision 21.      [facility] PLACEMENT CRITERIA:   DIMENSION 1:  Intoxication/withdrawal:  0.  On the date of assessment, his ITA was 0.000, his UA was negative.  He did not identify any current symptoms of alcohol or drug withdrawal.  He does not identify any history of alcohol or drug used that would require detoxification.  He has never been in detox.      DIMENSION 2:  Biomedical Conditions:  0.  Client denies having any chronic biomedical conditions that would interfere with CD treatment or counseling or awareness classes.  He does endorse having the following medical condition, namely sore shoulders from manual work.  He is currently on 50 mg of tramadol p.r.n. less than 1 a day and that appears to be helping.  He does have insurance and does have a PCP.      DIMENSION 3:  Emotional/behavioral:  0.  On the date of evaluation his PHQ-9 was of 0/7, his DAVY-7 0/21.  He reported never being diagnosed with any mental health issues.  He is currently on the above medications and is not seeing a therapist.      Devon denies a history of any past or current  suicidal ideation, attempts or self-injurious behavior.      He grew up in [place], had a happy childhood, was raised by his paternal grandparents.  Traditionally in his [place], the paternal grandparents raise their first grandson.  He lived with them from the age of 1-12 until both grandparents passed away.  His grandfather passed away when he was 10, his grandmother when he was 11.  His father  in .  At 11 he went to [facility] to live with his uncle.  He moved to the [place] in [place] and went to the [employer], where he got a BS in agriculture and an MA in economics.  He has a brother, 55, and a sister, 59.  He denies a history of any CD or mental health issues in the family and felt supported 100% growing up.      DIMENSION 4:  Readiness for change:  0.  Devon was cooperative with the evaluation process.  He did not identify any history of abusing alcohol or drugs or mood-altering drugs and has no intention of doing so in the future.  He came for the evaluation solely because he understood that it was required by the [place] Board of [employer].  It appears that misunderstood the requirements of the Board, who required that he have a mental health assessment not a CD assessment.      DIMENSION 5:  Relapse potential:  0.  There are no indications that Devon has ever had a problem with substance abuse or any mental health issues that would increase his risk of substance abuse.  He has no identified family history of addiction and/or mental health.      DIMENSION 6:  Recovery environment:  0.  Devon is from [place] and has a very intact support non-using family.  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  He is the oldest son, so takes pride in being able to support his children and send extra money back to his family in [place].  He lives with his supportive wife of almost 25 years and his 19-year-old son.  He also has a 21-year-old son who is studying at [employer].  He is very  committed to his Confucianism and attends weekly even on the days when he works the evening shift.      IMPRESSION:   Devon does not meet any of the 11 DSM-V criteria for diagnosis of substance abuse or dependency.      RECOMMENDATIONS:    1) Devon is to follow the requirements of the [place] Board of [employer].  They, among other things, are requiring that he have a mental health assessment, not a CD assessment.  I have confirmed this with Mable Gabriel at the [place] Board of [employer], discussed this directly with Devon.  I told him to look at the list he was given and identify a therapist who could do a mental health assessment.  2) Devon is to be judicious in his use of Tramadol as it can become addicting.    3) No other recommendations are indicated, as he does not meet any criteria for a substance use disorder.      INITIAL PROBLEM LIST:  The patient's primary problem area at this point is that he is involved with the [place] Board of [employer], which is making numerous requirements for him.  He appears to be very conscientious and motivated to follow through with the recommendations made by the Board.         This information has been disclosed to you from records protected by Federal confidentiality rules (42 CFR part 2). The Federal rules prohibit you from making any further disclosure of this information unless further disclosure is expressly permitted by the written consent of the person to whom it pertains or as otherwise permitted by 42 CFR part 2. A general authorization for the release of medical or other information is NOT sufficient for this purpose. The Federal rules restrict any use of the information to criminally investigate or prosecute any alcohol or drug abuse patient.      ERNA CH, LICSW             D: 10/03/2017 17:11   T: 10/03/2017 19:09   MT: FIOR      Name:     DEVON ALFARO   MRN:      [id number]        Account:      [id number]   :      1950           Visit  Date:   10/02/2017      Document: [id number]

## 2017-10-05 RX ORDER — TRAMADOL HYDROCHLORIDE 50 MG/1
TABLET ORAL
Qty: 30 TABLET | Refills: 0 | Status: SHIPPED | OUTPATIENT
Start: 2017-10-05 | End: 2017-11-06

## 2017-10-05 NOTE — TELEPHONE ENCOUNTER
Refill done but he needs an appointment.  The prescription is in my completed folder.    Nicole Magdaleno D.O.

## 2017-10-05 NOTE — TELEPHONE ENCOUNTER
Relayed message to Alfonso Kemp as Evgeny's VM says he is out of the office until 10/16. He verbalized understanding.   Irena Govea RN

## 2017-10-05 NOTE — TELEPHONE ENCOUNTER
I have no concerns about chemical dependency.  It is unclear how this referral came about.  Did he self refer?  I know I didn't refer him.  Is this an inquiry for concern about his work performance?    Nicole Magdaleno D.O.

## 2017-11-06 DIAGNOSIS — M25.819 SHOULDER IMPINGEMENT, UNSPECIFIED LATERALITY: ICD-10-CM

## 2017-11-07 NOTE — TELEPHONE ENCOUNTER
Medication Detail      Disp Refills Start End GEORGINA   traMADol (ULTRAM) 50 MG tablet 30 tablet 0 10/5/2017  No   Sig: TAKE 1 TO 2 TABLETS BY MOUTH EVERY 6 HOURS AS NEEDED FOR PAIN. MAX 8 TABLETS DAILY   Class: Local Print   Order: [id number]     LOV: 10/10/2017    Future Office visit:       Routing refill request to provider for review/approval because:  Drug not on the [facility] refill protocol or controlled substance

## 2017-11-08 RX ORDER — TRAMADOL HYDROCHLORIDE 50 MG/1
TABLET ORAL
Qty: 30 TABLET | Refills: 0 | Status: SHIPPED | OUTPATIENT
Start: 2017-11-08 | End: 2017-11-10

## 2017-11-08 NOTE — TELEPHONE ENCOUNTER
Routing refill request to provider for review/approval because:  Drug not on the [facility] refill protocol   Joselyn Starkey RN

## 2017-11-08 NOTE — TELEPHONE ENCOUNTER
Refill x 1 in PCP as it appears he gets a monthly script.  Please call or fax.    No chronic pain plan in problem list.  Will route to PCP to update.    VINI Benz, PA-C

## 2017-11-10 RX ORDER — TRAMADOL HYDROCHLORIDE 50 MG/1
TABLET ORAL
Qty: 30 TABLET | Refills: 0 | Status: SHIPPED | OUTPATIENT
Start: 2017-11-10 | End: 2017-11-20

## 2017-11-20 ENCOUNTER — OFFICE VISIT (OUTPATIENT)
Dept: FAMILY MEDICINE | Facility: CLINIC | Age: 67
End: 2017-11-20
Payer: COMMERCIAL

## 2017-11-20 VITALS
TEMPERATURE: 98.9 F | HEART RATE: 112 BPM | BODY MASS INDEX: 22.16 KG/M2 | DIASTOLIC BLOOD PRESSURE: 60 MMHG | WEIGHT: 133 LBS | SYSTOLIC BLOOD PRESSURE: 120 MMHG | HEIGHT: 65 IN

## 2017-11-20 DIAGNOSIS — M25.819 SHOULDER IMPINGEMENT, UNSPECIFIED LATERALITY: ICD-10-CM

## 2017-11-20 DIAGNOSIS — Z13.5 SCREENING FOR DIABETIC RETINOPATHY: ICD-10-CM

## 2017-11-20 DIAGNOSIS — I10 BENIGN ESSENTIAL HYPERTENSION: ICD-10-CM

## 2017-11-20 DIAGNOSIS — E78.5 HYPERLIPIDEMIA WITH TARGET LDL LESS THAN 100: ICD-10-CM

## 2017-11-20 DIAGNOSIS — Z23 NEED FOR PROPHYLACTIC VACCINATION AND INOCULATION AGAINST INFLUENZA: ICD-10-CM

## 2017-11-20 DIAGNOSIS — R00.0 ELEVATED PULSE RATE: ICD-10-CM

## 2017-11-20 DIAGNOSIS — E11.9 TYPE 2 DIABETES MELLITUS WITHOUT COMPLICATION, WITHOUT LONG-TERM CURRENT USE OF INSULIN (H): Primary | ICD-10-CM

## 2017-11-20 LAB
ERYTHROCYTE [DISTWIDTH] IN BLOOD BY AUTOMATED COUNT: 12.1 % (ref 10–15)
HBA1C MFR BLD: 6.4 % (ref 4.3–6)
HCT VFR BLD AUTO: 36.4 % (ref 40–53)
HGB BLD-MCNC: 12 G/DL (ref 13.3–17.7)
MCH RBC QN AUTO: 33.4 PG (ref 26.5–33)
MCHC RBC AUTO-ENTMCNC: 33 G/DL (ref 31.5–36.5)
MCV RBC AUTO: 101 FL (ref 78–100)
PLATELET # BLD AUTO: 234 10E9/L (ref 150–450)
RBC # BLD AUTO: 3.59 10E12/L (ref 4.4–5.9)
WBC # BLD AUTO: 7.5 10E9/L (ref 4–11)

## 2017-11-20 PROCEDURE — 36415 COLL VENOUS BLD VENIPUNCTURE: CPT | Performed by: FAMILY MEDICINE

## 2017-11-20 PROCEDURE — 84443 ASSAY THYROID STIM HORMONE: CPT | Performed by: FAMILY MEDICINE

## 2017-11-20 PROCEDURE — 83036 HEMOGLOBIN GLYCOSYLATED A1C: CPT | Performed by: FAMILY MEDICINE

## 2017-11-20 PROCEDURE — 93000 ELECTROCARDIOGRAM COMPLETE: CPT | Performed by: FAMILY MEDICINE

## 2017-11-20 PROCEDURE — G0008 ADMIN INFLUENZA VIRUS VAC: HCPCS | Performed by: FAMILY MEDICINE

## 2017-11-20 PROCEDURE — 99214 OFFICE O/P EST MOD 30 MIN: CPT | Mod: 25 | Performed by: FAMILY MEDICINE

## 2017-11-20 PROCEDURE — 90662 IIV NO PRSV INCREASED AG IM: CPT | Performed by: FAMILY MEDICINE

## 2017-11-20 PROCEDURE — 85027 COMPLETE CBC AUTOMATED: CPT | Performed by: FAMILY MEDICINE

## 2017-11-20 RX ORDER — LISINOPRIL 20 MG/1
20 TABLET ORAL DAILY
Qty: 90 TABLET | Refills: 1 | Status: SHIPPED | OUTPATIENT
Start: 2017-11-20 | End: 2018-06-04

## 2017-11-20 RX ORDER — TRAMADOL HYDROCHLORIDE 50 MG/1
50 TABLET ORAL EVERY 6 HOURS PRN
Qty: 30 TABLET | Refills: 0 | Status: SHIPPED | OUTPATIENT
Start: 2017-11-20 | End: 2018-02-23

## 2017-11-20 RX ORDER — ATORVASTATIN CALCIUM 10 MG/1
10 TABLET, FILM COATED ORAL DAILY
Qty: 90 TABLET | Refills: 1 | Status: SHIPPED | OUTPATIENT
Start: 2017-11-20 | End: 2019-08-14

## 2017-11-20 RX ORDER — GLIPIZIDE 10 MG/1
10 TABLET ORAL
Qty: 180 TABLET | Refills: 1 | Status: SHIPPED | OUTPATIENT
Start: 2017-11-20 | End: 2019-01-03

## 2017-11-20 RX ORDER — TRAMADOL HYDROCHLORIDE 50 MG/1
TABLET ORAL
Qty: 30 TABLET | Refills: 0 | Status: SHIPPED | OUTPATIENT
Start: 2017-11-20 | End: 2018-08-28

## 2017-11-20 RX ORDER — TRAMADOL HYDROCHLORIDE 50 MG/1
TABLET ORAL
Qty: 30 TABLET | Refills: 0 | Status: SHIPPED | OUTPATIENT
Start: 2017-11-20 | End: 2018-02-23

## 2017-11-20 NOTE — NURSING NOTE
Prior to injection verified patient identity using patient's name and date of birth.  Rosalia Govea, CMA

## 2017-11-20 NOTE — MR AVS SNAPSHOT
After Visit Summary   11/20/2017    Devon Watts    MRN: [id number]           Patient Information     Date Of Birth          1950        Visit Information        Provider Department      11/20/2017 12:40 PM Nicole Magdaleno DO [facility]        Today's Diagnoses     Type 2 diabetes mellitus without complication, without long-term current use of insulin (H)    -  1    Benign essential hypertension        Hyperlipidemia with target LDL less than 100        Impingement syndrome of both shoulders        Screening for diabetic retinopathy        Screening for diabetic peripheral neuropathy        Need for prophylactic vaccination and inoculation against influenza        Elevated pulse rate        Shoulder impingement, unspecified laterality          Care Instructions    Start taking iron supplements    Follow-up with me in 6 months to recheck diabetes, blood pressure, and cholesterol          Follow-ups after your visit        Additional Services     OPHTHALMOLOGY ADULT REFERRAL       Your provider has referred you to: [facility]: [facility] Florian Castillo [phone]   [email or web address]    Please be aware that coverage of these services is subject to the terms and limitations of your health insurance plan.  Call member services at your health plan with any benefit or coverage questions.      Please bring the following with you to your appointment:    (1) Any X-Rays, CTs or MRIs which have been performed.  Contact the facility where they were done to arrange for  prior to your scheduled appointment.    (2) List of current medications  (3) This referral request   (4) Any documents/labs given to you for this referral                  Who to contact     If you have questions or need follow up information about today's clinic visit or your schedule please contact [facility] directly at [phone].  Normal or non-critical lab and  "imaging results will be communicated to you by MyChart, letter or phone within 4 business days after the clinic has received the results. If you do not hear from us within 7 days, please contact the clinic through [employer] or phone. If you have a critical or abnormal lab result, we will notify you by phone as soon as possible.  Submit refill requests through [employer] or call your pharmacy and they will forward the refill request to us. Please allow 3 business days for your refill to be completed.          Additional Information About Your Visit        [email or web address] Information     [employer] lets you send messages to your doctor, view your test results, renew your prescriptions, schedule appointments and more. To sign up, go to [email or web address] . Click on \"Log in\" on the left side of the screen, which will take you to the Welcome page. Then click on \"Sign up Now\" on the right side of the page.     You will be asked to enter the access code listed below, as well as some personal information. Please follow the directions to create your username and password.     Your access code is: [email or web address]  Expires: 2018  1:42 PM     Your access code will  in 90 days. If you need help or a new code, please call your [place] clinic or [phone].        Care EveryWhere ID     This is your Care EveryWhere ID. This could be used by other organizations to access your [place] medical records  [id number]        Your Vitals Were     Pulse Temperature Height BMI (Body Mass Index)          112 98.9  F (37.2  C) (Oral) 5' 4.5\" (1.638 m) 22.48 kg/m2         Blood Pressure from Last 3 Encounters:   17 120/60   17 122/58   17 116/62    Weight from Last 3 Encounters:   17 133 lb (60.3 kg)   17 137 lb (62.1 kg)   17 132 lb 8 oz (60.1 kg)              We Performed the Following     CBC with platelets     EKG 12-lead complete w/read - Clinics     FLU VACCINE, INCREASED ANTIGEN, PRESV FREE, AGE 65+ " [73697]     HEMOGLOBIN A1C     OPHTHALMOLOGY ADULT REFERRAL     TSH with free T4 reflex     Vaccine Administration, Initial [78419]          Today's Medication Changes          These changes are accurate as of: 11/20/17  2:00 PM.  If you have any questions, ask your nurse or doctor.               These medicines have changed or have updated prescriptions.        Dose/Directions    * traMADol 50 MG tablet   Commonly known as:  ULTRAM   This may have changed:  additional instructions   Used for:  Shoulder impingement, unspecified laterality   Changed by:  Nicole Magdaleno DO        TAKE 1 TO 2 TABLETS BY MOUTH EVERY 6 HOURS AS NEEDED FOR PAIN(MAX 8 TABLETS PER DAY) Do not fill before 1/20/18   Quantity:  30 tablet   Refills:  0       * traMADol 50 MG tablet   Commonly known as:  ULTRAM   This may have changed:  additional instructions   Used for:  Shoulder impingement, unspecified laterality   Changed by:  Nicole Magdaleno,         TAKE 1 TO 2 TABLETS BY MOUTH EVERY 6 HOURS AS NEEDED FOR PAIN. MAX 8 TABLETS DAILY, do not fill before 12/20/17   Quantity:  30 tablet   Refills:  0       * traMADol 50 MG tablet   Commonly known as:  ULTRAM   This may have changed:  additional instructions   Used for:  Shoulder impingement, unspecified laterality   Changed by:  Nicole Magdaleno,         Dose:  50 mg   Take 1 tablet (50 mg) by mouth every 6 hours as needed for moderate pain Take 1-2 tablets as needed do not fill before 11/20/17   Quantity:  30 tablet   Refills:  0       * Notice:  This list has 3 medication(s) that are the same as other medications prescribed for you. Read the directions carefully, and ask your doctor or other care provider to review them with you.         Where to get your medicines      These medications were sent to [email or web address] [id number] [address]  [address] [address]     Phone:  [phone]     atorvastatin 10 MG tablet     glipiZIDE 10 MG tablet    lisinopril 20 MG tablet    metFORMIN 1000 MG tablet         Some of these will need a paper prescription and others can be bought over the counter.  Ask your nurse if you have questions.     Bring a paper prescription for each of these medications     traMADol 50 MG tablet    traMADol 50 MG tablet    traMADol 50 MG tablet                Primary Care Provider Office Phone # Fax #    Nicole Magdaleno [phone]       [address] [facility] [address]        Equal Access to Services     RANDOLPH ELMORE : Hadii aad ku hadasho Soomaali, waaxda luqadaha, qaybta kaalmada adeegyada, waxay idiin hayaan rachel anna. So [facility] [phone].    ATENCIÓN: Si habla español, tiene a nicole disposición servicios gratuitos de asistencia lingüística. [facility] [phone].    We comply with applicable federal civil rights laws and [place] laws. We do not discriminate on the basis of race, color, national origin, age, disability, sex, sexual orientation, or gender identity.            Thank you!     Thank you for choosing [facility]  for your care. Our goal is always to provide you with excellent care. Hearing back from our patients is one way we can continue to improve our services. Please take a few minutes to complete the written survey that you may receive in the mail after your visit with us. Thank you!             Your Updated Medication List - Protect others around you: Learn how to safely use, store and throw away your medicines at www.disposemymeds.org.          This list is accurate as of: 11/20/17  2:00 PM.  Always use your most recent med list.                   Brand Name Dispense Instructions for use Diagnosis    aspirin 325 MG tablet      Take 325 mg by mouth daily        atorvastatin 10 MG tablet    LIPITOR    90 tablet    Take 1 tablet (10 mg) by mouth daily    Hyperlipidemia with target LDL less than 100       glipiZIDE 10 MG tablet    GLUCOTROL     180 tablet    Take 1 tablet (10 mg) by mouth 2 times daily (before meals)    Type 2 diabetes mellitus without complication, without long-term current use of insulin (H)       lisinopril 20 MG tablet    PRINIVIL/ZESTRIL    90 tablet    Take 1 tablet (20 mg) by mouth daily    Benign essential hypertension       metFORMIN 1000 MG tablet    GLUCOPHAGE    180 tablet    Take 1 tablet (1,000 mg) by mouth 2 times daily (with meals)    Type 2 diabetes mellitus without complication, without long-term current use of insulin (H)       * traMADol 50 MG tablet    ULTRAM    30 tablet    TAKE 1 TO 2 TABLETS BY MOUTH EVERY 6 HOURS AS NEEDED FOR PAIN(MAX 8 TABLETS PER DAY) Do not fill before 1/20/18    Shoulder impingement, unspecified laterality       * traMADol 50 MG tablet    ULTRAM    30 tablet    TAKE 1 TO 2 TABLETS BY MOUTH EVERY 6 HOURS AS NEEDED FOR PAIN. MAX 8 TABLETS DAILY, do not fill before 12/20/17    Shoulder impingement, unspecified laterality       * traMADol 50 MG tablet    ULTRAM    30 tablet    Take 1 tablet (50 mg) by mouth every 6 hours as needed for moderate pain Take 1-2 tablets as needed do not fill before 11/20/17    Shoulder impingement, unspecified laterality       * Notice:  This list has 3 medication(s) that are the same as other medications prescribed for you. Read the directions carefully, and ask your doctor or other care provider to review them with you.

## 2017-11-20 NOTE — PATIENT INSTRUCTIONS
Start taking iron supplements    Follow-up with me in 6 months to recheck diabetes, blood pressure, and cholesterol

## 2017-11-20 NOTE — PROGRESS NOTES
SUBJECTIVE:   Devon Watts is a 66 year old male who presents to clinic today for the following health issues:      Diabetes Follow-up    Patient is checking blood sugars: once daily.  Results are as follows: averages 120's  Glipizide 10 mg BID and metformin 1000 mg BID    Diabetic concerns: None     Symptoms of hypoglycemia (low blood sugar): none     Paresthesias (numbness or burning in feet) or sores: No     Date of last diabetic eye exam: December 2016  Lab Results   Component Value Date    A1C 6.4 11/20/2017    A1C 6.4 04/18/2017    A1C 6.4 09/09/2016        Hyperlipidemia Follow-Up      Rate your low fat/cholesterol diet?: good    Taking statin?  Yes, no muscle aches from statin    Lipitor 10 mg    Other lipid medications/supplements?:  none      Hypertension Follow-up      Outpatient blood pressures are being checked at work.  Results are 120's over 70's.    Low Salt Diet: low salt    Lisinopril 20 mg        Amount of exercise or physical activity: Does stretches at home from PT    Problems taking medications regularly: No    Medication side effects: none    Diet: low salt and low fat/cholesterol      Patient reports an elevated pulse on and off.  He states he notices this once a month.  Patient notes hen this occurs it will slow down within a few hours. Otherwise he states his pulse has been in the 70's according to his automatic blood pressure cuff.  He denies SOB.  Patient states he has been coughing at night over the past few days.  He states he has not been coughing much during the day.  He reports some nasal congestion.     Patient had a normal colonoscopy a few months ago.       Problem list and histories reviewed & adjusted, as indicated.  Additional history: as documented    BP Readings from Last 3 Encounters:   11/20/17 120/60   10/02/17 156/69   06/22/17 122/58    Wt Readings from Last 3 Encounters:   11/20/17 133 lb (60.3 kg)   10/02/17 131 lb 3.2 oz (59.5 kg)   06/22/17 137 lb (62.1 kg)     "                Reviewed and updated as needed this visit by clinical staffTobacco  Allergies  Meds  Med Hx  Surg Hx  Fam Hx  Soc Hx      Reviewed and updated as needed this visit by Provider       ROS:  Constitutional, HEENT, GI, , musculoskeletal, neuro, skin, endocrine and psych systems are negative, except as otherwise noted.  Cardiovascular, pulmonary: see above      OBJECTIVE:   /60 (BP Location: Right arm, Cuff Size: Adult Regular)  Pulse 112  Temp 98.9  F (37.2  C) (Oral)  Ht 5' 4.5\" (1.638 m)  Wt 133 lb (60.3 kg)  BMI 22.48 kg/m2  Body mass index is 22.48 kg/(m^2).  GENERAL: healthy, alert and no distress  EYES: Eyes grossly normal to inspection, PERRL and conjunctivae and sclerae normal  HENT: ear canals and TM's normal, nose and mouth without ulcers or lesions  NECK: no adenopathy, no asymmetry, masses, or scars and thyroid normal to palpation  RESP: lungs clear to auscultation - no rales, rhonchi or wheezes  CV: regular rate and rhythm, normal S1 S2, no S3 or S4, no murmur, click or rub, no peripheral edema and peripheral pulses strong  MS: no gross musculoskeletal defects noted, no edema; anterior shoulders with tenderness at the biceps tendon  SKIN: no suspicious lesions or rashes  NEURO: Normal strength and tone, mentation intact and speech normal  PSYCH: mentation appears normal, affect normal/bright    Diagnostic Test Results:  Results for orders placed or performed in visit on 11/20/17 (from the past 24 hour(s))   HEMOGLOBIN A1C   Result Value Ref Range    Hemoglobin A1C 6.4 (H) 4.3 - 6.0 %   CBC with platelets   Result Value Ref Range    WBC 7.5 4.0 - 11.0 10e9/L    RBC Count 3.59 (L) 4.4 - 5.9 10e12/L    Hemoglobin 12.0 (L) 13.3 - 17.7 g/dL    Hematocrit 36.4 (L) 40.0 - 53.0 %     (H) 78 - 100 fl    MCH 33.4 (H) 26.5 - 33.0 pg    MCHC 33.0 31.5 - 36.5 g/dL    RDW 12.1 10.0 - 15.0 %    Platelet Count 234 150 - 450 10e9/L       ASSESSMENT/PLAN:     Diabetes Type II, A1c " Controlled, non-insulin dependent   Associated with the following complications    Renal Complications:  None    Ophthalmologic Complications: None    Neurologic Complications: None    Peripheral Vascular Complications:  None    Other: None   Plan:  No changes in the patient's current treatment plan    Hyperlipidemia; controlled   Plan:  No changes in the patient's current treatment plan    Hypertension; controlled   Associated with the following complications:    None   Plan:  No changes in the patient's current treatment plan      ICD-10-CM    1. Type 2 diabetes mellitus without complication, without long-term current use of insulin (H) E11.9 HEMOGLOBIN A1C     glipiZIDE (GLUCOTROL) 10 MG tablet     metFORMIN (GLUCOPHAGE) 1000 MG tablet   2. Benign essential hypertension I10 lisinopril (PRINIVIL/ZESTRIL) 20 MG tablet   3. Hyperlipidemia with target LDL less than 100 E78.5 atorvastatin (LIPITOR) 10 MG tablet   4. Need for prophylactic vaccination and inoculation against influenza Z23 FLU VACCINE, INCREASED ANTIGEN, PRESV FREE, AGE 65+ [33918]     Vaccine Administration, Initial [89905]   5. Elevated pulse rate R00.0 EKG 12-lead complete w/read - Clinics     CBC with platelets     TSH with free T4 reflex   6. Shoulder impingement, unspecified laterality M75.40 traMADol (ULTRAM) 50 MG tablet     traMADol (ULTRAM) 50 MG tablet     traMADol (ULTRAM) 50 MG tablet   7. Screening for diabetic retinopathy Z13.5 OPHTHALMOLOGY ADULT REFERRAL     I ordered an EKG for patient's elevated pulse.  EKG results normal.  A1c within normal range; CBC showed mild anemia.  Patient had a normal colonoscopy a few months ago.  I recommend patient start taking iron supplements.  The current medical regimen for diabetes, hypertension, and dyslipidemia is effective;  continue present plan and medications.     Patient Instructions   Start taking iron supplements    Follow-up with me in 6 months to recheck diabetes, blood pressure, and  cholesterol      This document serves as a record of the services and decisions personally performed and made by Nicole Magdaleno DO. It was created on her/his behalf by Martha Zambrano, a trained medical scribe. The creation of this document is based on the provider's statements to the medical scribe.  Martha Zambrano November 20, 2017 1:10 PM     Nicole Magdaleno DO  [facility]      Injectable Influenza Immunization Documentation    1.  Is the person to be vaccinated sick today?  Yes    2. Does the person to be vaccinated have an allergy to a component   of the vaccine?   No  Egg Allergy Algorithm Link    3. Has the person to be vaccinated ever had a serious reaction   to influenza vaccine in the past?   No    4. Has the person to be vaccinated ever had Guillain-Barré syndrome?   No    Form completed by Barbara Gil CMA ([facility])

## 2017-11-20 NOTE — NURSING NOTE
Handed patient 3 printed out prescription for:    3 for:  traMADol (ULTRAM) 50 MG tablet    Rosalia Govea CMA

## 2017-11-21 LAB — TSH SERPL DL<=0.005 MIU/L-ACNC: 3.22 MU/L (ref 0.4–4)

## 2018-02-05 DIAGNOSIS — E11.9 TYPE 2 DIABETES MELLITUS WITHOUT COMPLICATION, WITHOUT LONG-TERM CURRENT USE OF INSULIN (H): ICD-10-CM

## 2018-02-05 NOTE — TELEPHONE ENCOUNTER
"Requested Prescriptions   Pending Prescriptions Disp Refills     metFORMIN (GLUCOPHAGE) 1000 MG tablet [Pharmacy Med Name: METFORMIN 1000MG TABLETS]  Last Written Prescription Date:  11/20/2017  Last Fill Quantity: 180 tabs,  # refills: 1   Last Office Visit with [facility] or [facility] prescribing provider:  11/20/2017  Future Office Visit:      180 tablet 0     Sig: TAKE 1 TABLET(1000 MG) BY MOUTH TWICE DAILY WITH MEALS    Biguanide Agents Failed    2/5/2018  1:15 PM       Failed - Patient has had a Microalbumin in the past 12 mos.    Recent Labs   Lab Test  01/13/17   1329   MICROL  6   UMALCR  5.72            Passed - Patient's BP is less than 140/90    BP Readings from Last 3 Encounters:   11/20/17 120/60   06/22/17 122/58   04/18/17 116/62                Passed - Patient has documented LDL within the past 12 mos.    Recent Labs   Lab Test  06/22/17   1345   LDL  77            Passed - Patient is age 10 or older       Passed - Patient has documented A1c within the specified period of time.    Recent Labs   Lab Test  11/20/17   1249   A1C  6.4*            Passed - Patient's CR is NOT>1.4 OR Patient's EGFR is NOT<45 within past 12 mos.    Recent Labs   Lab Test  06/22/17   1345   GFRESTIMATED  67   GFRESTBLACK  81       Recent Labs   Lab Test  06/22/17   1345   CR  1.10            Passed - Patient does NOT have a diagnosis of CHF.       Passed - Recent (6 mos) or future visit with authorizing provider's specialty    Patient had office visit in the last 6 months or has a visit in the next 30 days with authorizing provider.  See \"Patient Info\" tab in inbasket, or \"Choose Columns\" in Meds & Orders section of the refill encounter.            glipiZIDE (GLUCOTROL) 10 MG tablet [Pharmacy Med Name: GLIPIZIDE 10MG TABLETS]  Last Written Prescription Date:  11/20/2017  Last Fill Quantity: 180 tabs,  # refills: 1   Last Office Visit with [facility] or [facility] prescribing provider:  11/20/2017  Future Office Visit:      180 " "tablet 0     Sig: TAKE 1 TABLET(10 MG) BY MOUTH TWICE DAILY BEFORE MEALS    Sulfonylurea Agents Failed    2/5/2018  1:15 PM       Failed - Patient has had a Microalbumin in the past 12 mos.    Recent Labs   Lab Test  01/13/17   1329   MICROL  6   UMALCR  5.72            Passed - Patient's BP is less than 140/90    BP Readings from Last 3 Encounters:   11/20/17 120/60   06/22/17 122/58   04/18/17 116/62                Passed - Patient has documented LDL within the past 12 mos.    Recent Labs   Lab Test  06/22/17   1345   LDL  77            Passed - Patient has documented A1c within the specified period of time.    Recent Labs   Lab Test  11/20/17   1249   A1C  6.4*            Passed - Patient is age 18 or older       Passed - Patient has a recent creatinine (normal) within the past 12 mos.    Recent Labs   Lab Test  06/22/17   1345   CR  1.10            Passed - Patient has had an appointment with authorizing provider within the past 6 mos. or  within next 30 days    Patient had office visit in the last 6 months or has a visit in the next 30 days with authorizing provider.  See \"Patient Info\" tab in inbasket, or \"Choose Columns\" in Meds & Orders section of the refill encounter.              "

## 2018-02-06 RX ORDER — GLIPIZIDE 10 MG/1
TABLET ORAL
Qty: 180 TABLET | Refills: 0 | OUTPATIENT
Start: 2018-02-06

## 2018-02-06 NOTE — TELEPHONE ENCOUNTER
Medication refills refused, as they were both refilled on 11/20/17 for 6 months worth.  Beata Martinez RN

## 2018-04-06 ENCOUNTER — OFFICE VISIT (OUTPATIENT)
Dept: URGENT CARE | Facility: URGENT CARE | Age: 68
End: 2018-04-06
Payer: COMMERCIAL

## 2018-04-06 ENCOUNTER — RADIANT APPOINTMENT (OUTPATIENT)
Dept: GENERAL RADIOLOGY | Facility: CLINIC | Age: 68
End: 2018-04-06
Attending: PHYSICIAN ASSISTANT
Payer: COMMERCIAL

## 2018-04-06 VITALS
SYSTOLIC BLOOD PRESSURE: 128 MMHG | TEMPERATURE: 98.4 F | DIASTOLIC BLOOD PRESSURE: 66 MMHG | WEIGHT: 133.25 LBS | HEART RATE: 96 BPM | OXYGEN SATURATION: 99 % | BODY MASS INDEX: 22.52 KG/M2

## 2018-04-06 DIAGNOSIS — M54.6 ACUTE MIDLINE THORACIC BACK PAIN: ICD-10-CM

## 2018-04-06 DIAGNOSIS — R05.9 COUGH: ICD-10-CM

## 2018-04-06 DIAGNOSIS — J22 LOWER RESP. TRACT INFECTION: Primary | ICD-10-CM
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
## 2018-04-06 PROCEDURE — 72070 X-RAY EXAM THORAC SPINE 2VWS: CPT | Mod: FY

## 2018-04-06 PROCEDURE — 71046 X-RAY EXAM CHEST 2 VIEWS: CPT | Mod: FY

## 2018-04-06 PROCEDURE — 99214 OFFICE O/P EST MOD 30 MIN: CPT | Performed by: PHYSICIAN ASSISTANT

## 2018-04-06 RX ORDER — CEFUROXIME AXETIL 500 MG/1
500 TABLET ORAL 2 TIMES DAILY
Qty: 20 TABLET | Refills: 0 | Status: SHIPPED | OUTPATIENT
Start: 2018-04-06 | End: 2019-08-14

## 2018-04-06 RX ORDER — POLYETHYLENE GLYCOL-3350 AND ELECTROLYTES 236; 6.74; 5.86; 2.97; 22.74 G/274.31G; G/274.31G; G/274.31G; G/274.31G; G/274.31G
POWDER, FOR SOLUTION ORAL
Refills: 0 | COMMUNITY
Start: 2017-06-26 | End: 2021-03-08

## 2018-04-06 RX ORDER — IBUPROFEN 800 MG/1
800 TABLET, FILM COATED ORAL EVERY 8 HOURS PRN
Qty: 30 TABLET | Refills: 1 | Status: CANCELLED | OUTPATIENT
Start: 2018-04-06

## 2018-04-06 RX ORDER — IBUPROFEN 800 MG/1
800 TABLET, FILM COATED ORAL EVERY 8 HOURS PRN
Qty: 30 TABLET | Refills: 0 | Status: SHIPPED | OUTPATIENT
Start: 2018-04-06 | End: 2019-08-14

## 2018-04-06 RX ORDER — ACETAMINOPHEN 325 MG/1
650 TABLET ORAL
COMMUNITY
End: 2018-04-06

## 2018-04-06 NOTE — MR AVS SNAPSHOT
"              After Visit Summary   2018    Devon Watts    MRN: [id number]           Patient Information     Date Of Birth          1950        Visit Information        Provider Department      2018 7:05 PM Sisi Estrada PA-C [facility]        Today's Diagnoses     Lower resp. tract infection    -  1    Cough        Fall, initial encounter        Acute midline thoracic back pain           Follow-ups after your visit        Who to contact     If you have questions or need follow up information about today's clinic visit or your schedule please contact [facility] directly at [phone].  Normal or non-critical lab and imaging results will be communicated to you by [employer], letter or phone within 4 business days after the clinic has received the results. If you do not hear from us within 7 days, please contact the clinic through [employer] or phone. If you have a critical or abnormal lab result, we will notify you by phone as soon as possible.  Submit refill requests through Noosh or call your pharmacy and they will forward the refill request to us. Please allow 3 business days for your refill to be completed.          Additional Information About Your Visit        MyChart Information     Noosh lets you send messages to your doctor, view your test results, renew your prescriptions, schedule appointments and more. To sign up, go to [email or web address] . Click on \"Log in\" on the left side of the screen, which will take you to the Welcome page. Then click on \"Sign up Now\" on the right side of the page.     You will be asked to enter the access code listed below, as well as some personal information. Please follow the directions to create your username and password.     Your access code is: [email or web address]  Expires: 2018  8:29 PM     Your access code will  in 90 days. If you need help or a new code, please call your [facility] or " [phone].        Care EveryWhere ID     This is your Care EveryWhere ID. This could be used by other organizations to access your [place] medical records  [id number]        Your Vitals Were     Pulse Temperature Pulse Oximetry BMI (Body Mass Index)          96 98.4  F (36.9  C) (Oral) 99% 22.52 kg/m2         Blood Pressure from Last 3 Encounters:   04/06/18 128/66   11/20/17 120/60   06/22/17 122/58    Weight from Last 3 Encounters:   04/06/18 133 lb 4 oz (60.4 kg)   11/20/17 133 lb (60.3 kg)   06/22/17 137 lb (62.1 kg)                 Today's Medication Changes          These changes are accurate as of 4/6/18  8:29 PM.  If you have any questions, ask your nurse or doctor.               Start taking these medicines.        Dose/Directions    cefuroxime 500 MG tablet   Commonly known as:  CEFTIN   Used for:  Lower resp. tract infection   Started by:  Sisi Estrada PA-C        Dose:  500 mg   Take 1 tablet (500 mg) by mouth 2 times daily   Quantity:  20 tablet   Refills:  0       ibuprofen 800 MG tablet   Commonly known as:  ADVIL/MOTRIN   Used for:  Fall, initial encounter, Acute midline thoracic back pain   Started by:  Sisi Estrada PA-C        Dose:  800 mg   Take 1 tablet (800 mg) by mouth every 8 hours as needed for moderate pain   Quantity:  30 tablet   Refills:  0            Where to get your medicines      These medications were sent to [facility] [address] [place] [address] & [address] [address]     Phone:  [phone]     cefuroxime 500 MG tablet    ibuprofen 800 MG tablet                Primary Care Provider Office Phone # Fax #    Nicole DO Sharla [phone]       [address] [place] [address]        Equal Access to Services     RANDOLPH ELMORE AH: Ran Medell[place], wajaspalda luqadaha, qaybta kaalsg baker, yesi anna. So [facility]  [phone].    ATENCIÓN: Si sandra marsh, tiene a nicole disposición servicios gratuitos de asistencia lingüística. Vishal aguayo [phone].    We comply with applicable federal civil rights laws and [place] laws. We do not discriminate on the basis of race, color, national origin, age, disability, sex, sexual orientation, or gender identity.            Thank you!     Thank you for choosing [facility]  for your care. Our goal is always to provide you with excellent care. Hearing back from our patients is one way we can continue to improve our services. Please take a few minutes to complete the written survey that you may receive in the mail after your visit with us. Thank you!             Your Updated Medication List - Protect others around you: Learn how to safely use, store and throw away your medicines at www.disposemymeds.org.          This list is accurate as of 4/6/18  8:29 PM.  Always use your most recent med list.                   Brand Name Dispense Instructions for use Diagnosis    * aspirin 325 MG tablet      Take 325 mg by mouth daily        * aspirin 81 MG tablet      Take 81 mg by mouth daily        atorvastatin 10 MG tablet    LIPITOR    90 tablet    Take 1 tablet (10 mg) by mouth daily    Hyperlipidemia with target LDL less than 100       cefuroxime 500 MG tablet    CEFTIN    20 tablet    Take 1 tablet (500 mg) by mouth 2 times daily    Lower resp. tract infection       GAVILYTE-G 236 G suspension   Generic drug:  polyethylene glycol      TK UTD PER HANDOUT        glipiZIDE 10 MG tablet    GLUCOTROL    180 tablet    Take 1 tablet (10 mg) by mouth 2 times daily (before meals)    Type 2 diabetes mellitus without complication, without long-term current use of insulin (H)       ibuprofen 800 MG tablet    ADVIL/MOTRIN    30 tablet    Take 1 tablet (800 mg) by mouth every 8 hours as needed for moderate pain    Fall, initial encounter, Acute midline thoracic back pain       lisinopril 20  MG tablet    PRINIVIL/ZESTRIL    90 tablet    Take 1 tablet (20 mg) by mouth daily    Benign essential hypertension       metFORMIN 1000 MG tablet    GLUCOPHAGE    180 tablet    Take 1 tablet (1,000 mg) by mouth 2 times daily (with meals)    Type 2 diabetes mellitus without complication, without long-term current use of insulin (H)       * traMADol 50 MG tablet    ULTRAM    30 tablet    TAKE 1 TO 2 TABLETS BY MOUTH EVERY 6 HOURS AS NEEDED FOR PAIN(MAX 8 TABLETS PER DAY) Do not fill before 1/20/18    Shoulder impingement, unspecified laterality       * traMADol 50 MG tablet    ULTRAM    30 tablet    Take 1 tablet (50 mg) by mouth every 6 hours as needed for moderate pain Take 1-2 tablets as needed do not fill before 2/23/18    Shoulder impingement, unspecified laterality       * traMADol 50 MG tablet    ULTRAM    30 tablet    TAKE 1 TO 2 TABLETS BY MOUTH EVERY 6 HOURS AS NEEDED FOR PAIN. MAX 8 TABLETS DAILY, do not fill before 4/23/18    Shoulder impingement, unspecified laterality       * Notice:  This list has 5 medication(s) that are the same as other medications prescribed for you. Read the directions carefully, and ask your doctor or other care provider to review them with you.

## 2018-04-07 ENCOUNTER — NURSE TRIAGE (OUTPATIENT)
Dept: NURSING | Facility: CLINIC | Age: 68
End: 2018-04-07

## 2018-04-07 NOTE — TELEPHONE ENCOUNTER
Clinic Action Needed: none  Reason for Call: Wife called without Pt .  Seen [facility]  last night , [facility]  wrote Rx (CEFTIN) 500 MG tablet, but  didn t [facility] [phone]  And [facility] spoke to Pharmacist Rik , and Rx had arrived and will be filled in few minutes , and wife advised of this .   .Nasreen Reid RN [place] nurse advisors.

## 2018-04-07 NOTE — NURSING NOTE
"Chief Complaint   Patient presents with     URI     Present for almost 2 weeks, coughing will not go away, no appetite, weak, runny nose, not sure if he has had fever or chills     Back Pain     fell 3-4 days ago on the ice, tailbone is hurting now, did not hit head.       Initial /66 (BP Location: Left arm, Patient Position: Chair, Cuff Size: Adult Regular)  Pulse 96  Temp 98.4  F (36.9  C) (Oral)  Wt 133 lb 4 oz (60.4 kg)  SpO2 99%  BMI 22.52 kg/m2 Estimated body mass index is 22.52 kg/(m^2) as calculated from the following:    Height as of 11/20/17: 5' 4.5\" (1.638 m).    Weight as of this encounter: 133 lb 4 oz (60.4 kg).  Medication Reconciliation: complete   Khushbu Hill MA    "

## 2018-04-07 NOTE — PROGRESS NOTES
SUBJECTIVE:   Devon Watts is a 67 year old male who presents to clinic today for the following health issues:      RESPIRATORY SYMPTOMS      Duration: 2 weeks    Description  nasal congestion, rhinorrhea, cough, fatigue/malaise, hoarse voice and feels chest congestion sometimes    Severity: moderate    Accompanying signs and symptoms: as noted above    History (predisposing factors):  none    Precipitating or alleviating factors: None    Therapies tried and outcome:  Cough drops and Robitussin    Back Pain       Duration: 3-4 days        Specific cause: fall on ice onto buttocks    Description:   Location of pain: middle of back both  Character of pain: sharp and waxing and waning  Pain radiation:none  New numbness or weakness in legs, not attributed to pain:  YES- on the right leg, especially with prolonged sitting, some numbness anterior shin    Intensity: Currently 7/10, moderate    History:   Pain interferes with job: No  History of back problems: no prior back problems  Any previous MRI or X-rays: None  Sees a specialist for back pain:  No  Therapies tried without relief: N/A    Alleviating factors:   Improved by: acetaminophen (Tylenol)      Precipitating factors:  Worsened by: Bending    Functional and Psychosocial Screen (Karishma STarT Back):      Not performed today  No fever or night sweats. No bowel/bladder trouble.    No Known Allergies    No past medical history on file.      Current Outpatient Prescriptions on File Prior to Visit:  traMADol (ULTRAM) 50 MG tablet Take 1 tablet (50 mg) by mouth every 6 hours as needed for moderate pain Take 1-2 tablets as needed do not fill before 2/23/18   traMADol (ULTRAM) 50 MG tablet TAKE 1 TO 2 TABLETS BY MOUTH EVERY 6 HOURS AS NEEDED FOR PAIN. MAX 8 TABLETS DAILY, do not fill before 4/23/18   glipiZIDE (GLUCOTROL) 10 MG tablet Take 1 tablet (10 mg) by mouth 2 times daily (before meals)   lisinopril (PRINIVIL/ZESTRIL) 20 MG tablet Take 1 tablet (20 mg) by mouth  daily   metFORMIN (GLUCOPHAGE) 1000 MG tablet Take 1 tablet (1,000 mg) by mouth 2 times daily (with meals)   traMADol (ULTRAM) 50 MG tablet TAKE 1 TO 2 TABLETS BY MOUTH EVERY 6 HOURS AS NEEDED FOR PAIN(MAX 8 TABLETS PER DAY) Do not fill before 1/20/18   aspirin 325 MG tablet Take 325 mg by mouth daily   atorvastatin (LIPITOR) 10 MG tablet Take 1 tablet (10 mg) by mouth daily (Patient not taking: Reported on 4/6/2018)     No current facility-administered medications on file prior to visit.     Social History   Substance Use Topics     Smoking status: Never Smoker     Smokeless tobacco: Never Used     Alcohol use No       ROS:  CONSTITUTIONAL: Negative for fatigue or fever.  EYES: Negative for eye problems.  ENT: As above.  RESP: As above.  CV: Negative for chest pains.  GI: Negative for vomiting.  MUSCULOSKELETAL:  As above.  NEUROLOGIC: Negative for headaches.  SKIN: Negative for rash.  - no dysuria.    OBJECTIVE:  /66 (BP Location: Left arm, Patient Position: Chair, Cuff Size: Adult Regular)  Pulse 96  Temp 98.4  F (36.9  C) (Oral)  Wt 133 lb 4 oz (60.4 kg)  SpO2 99%  BMI 22.52 kg/m2  GENERAL APPEARANCE: Healthy, alert and no distress.  EYES:Conjunctiva/sclera clear.  EARS: No cerumen.   Ear canals w/o erythema.  TM's intact w/o erythema.    NOSE/MOUTH: Nose without ulcers, erythema or lesions.  SINUSES: No maxillary sinus tenderness.  THROAT: No erythema w/o tonsillar enlargement . No exudates.  NECK: Supple, nontender, no lymphadenopathy.  RESP: Lungs clear to auscultation - no rales, rhonchi or wheezes  CV: Regular rate and rhythm, normal S1 S2, no murmur noted.  NEURO: Awake, alert    SKIN: No rashes  Back- tender mid to lower thoracic spine. Lumbar spine NT. Reflexes, motor lower extremities intact. Negative supine SLR    X rays chest- I see no infiltrates.  xrays thoracic spine- I see no obvious fracture. Radiologist report states the same    ASSESSMENT:     ICD-10-CM    1. Lower resp. tract  infection J22 cefuroxime (CEFTIN) 500 MG tablet   2. Cough R05 XR Chest 2 Views   3. Fall, initial encounter W19.XXXA XR Thoracic Spine 2 Views     ibuprofen (ADVIL/MOTRIN) 800 MG tablet   4. Acute midline thoracic back pain M54.6 XR Thoracic Spine 2 Views     ibuprofen (ADVIL/MOTRIN) 800 MG tablet       PLAN:Observe shin numbness. F/U PCP next week. To ER over the week end if any concerns.  Lots of rest and fluids.  RTC if any worsening symptoms or if not improving.    Sisi Estrada PA-C

## 2018-04-16 ENCOUNTER — TELEPHONE (OUTPATIENT)
Dept: OTHER | Facility: CLINIC | Age: 68
End: 2018-04-16

## 2018-04-16 NOTE — TELEPHONE ENCOUNTER
4/16/2018    Call Regarding Onboarding [employer] Other    Attempt 1    Message on voicemail     Comments:       Outreach   SV

## 2018-05-09 NOTE — TELEPHONE ENCOUNTER
5/9/2018    Call Regarding Onboarding [employer] OTHER    Attempt 2    Message on voicemail     Comments:           Outreach   AT

## 2018-05-24 ENCOUNTER — TELEPHONE (OUTPATIENT)
Dept: FAMILY MEDICINE | Facility: CLINIC | Age: 68
End: 2018-05-24

## 2018-05-24 DIAGNOSIS — M75.41 IMPINGEMENT SYNDROME OF BOTH SHOULDERS: Primary | ICD-10-CM

## 2018-05-24 DIAGNOSIS — M75.42 IMPINGEMENT SYNDROME OF BOTH SHOULDERS: Primary | ICD-10-CM

## 2018-05-24 RX ORDER — TRAMADOL HYDROCHLORIDE 50 MG/1
TABLET ORAL
Qty: 30 TABLET | Refills: 0 | Status: SHIPPED | OUTPATIENT
Start: 2018-05-24 | End: 2018-06-04

## 2018-05-24 NOTE — TELEPHONE ENCOUNTER
traMADol (ULTRAM) 50 MG tablet      Last Written Prescription Date:  2/23/2018  Last Fill Quantity: 30 tablet,   # refills: 0  Last Office Visit: 11/20/2017  SHANITA Erazo    Future Office visit:       Routing refill request to provider for review/approval because:  Drug not on the [facility], [facility] or [facility] refill protocol or controlled substance

## 2018-05-24 NOTE — TELEPHONE ENCOUNTER
Ok for a refill until patient can schedule or no? 11/20 OV states patient is due in 6 months for chronic care. Left VM with my & our clinic scheduling number.  Irena Govea RN

## 2018-06-04 ENCOUNTER — OFFICE VISIT (OUTPATIENT)
Dept: FAMILY MEDICINE | Facility: CLINIC | Age: 68
End: 2018-06-04
Payer: COMMERCIAL

## 2018-06-04 VITALS
DIASTOLIC BLOOD PRESSURE: 68 MMHG | SYSTOLIC BLOOD PRESSURE: 121 MMHG | HEART RATE: 92 BPM | BODY MASS INDEX: 21.66 KG/M2 | HEIGHT: 65 IN | OXYGEN SATURATION: 100 % | TEMPERATURE: 97.8 F | WEIGHT: 130 LBS

## 2018-06-04 DIAGNOSIS — I10 BENIGN ESSENTIAL HYPERTENSION: ICD-10-CM

## 2018-06-04 DIAGNOSIS — E11.9 TYPE 2 DIABETES MELLITUS WITHOUT COMPLICATION, WITHOUT LONG-TERM CURRENT USE OF INSULIN (H): Primary | ICD-10-CM

## 2018-06-04 DIAGNOSIS — M75.41 IMPINGEMENT SYNDROME OF BOTH SHOULDERS: ICD-10-CM

## 2018-06-04 DIAGNOSIS — Z13.89 SCREENING FOR DIABETIC PERIPHERAL NEUROPATHY: ICD-10-CM

## 2018-06-04 DIAGNOSIS — M75.42 IMPINGEMENT SYNDROME OF BOTH SHOULDERS: ICD-10-CM

## 2018-06-04 LAB — HBA1C MFR BLD: 5.9 % (ref 0–5.6)

## 2018-06-04 PROCEDURE — 83036 HEMOGLOBIN GLYCOSYLATED A1C: CPT | Performed by: FAMILY MEDICINE

## 2018-06-04 PROCEDURE — 80061 LIPID PANEL: CPT | Performed by: FAMILY MEDICINE

## 2018-06-04 PROCEDURE — 99214 OFFICE O/P EST MOD 30 MIN: CPT | Performed by: FAMILY MEDICINE

## 2018-06-04 PROCEDURE — 82043 UR ALBUMIN QUANTITATIVE: CPT | Performed by: FAMILY MEDICINE

## 2018-06-04 PROCEDURE — 36415 COLL VENOUS BLD VENIPUNCTURE: CPT | Performed by: FAMILY MEDICINE

## 2018-06-04 PROCEDURE — 99207 C FOOT EXAM  NO CHARGE: CPT | Performed by: FAMILY MEDICINE

## 2018-06-04 PROCEDURE — 80048 BASIC METABOLIC PNL TOTAL CA: CPT | Performed by: FAMILY MEDICINE

## 2018-06-04 RX ORDER — GLIPIZIDE 10 MG/1
10 TABLET, FILM COATED, EXTENDED RELEASE ORAL DAILY
Qty: 90 TABLET | Refills: 1 | Status: SHIPPED | OUTPATIENT
Start: 2018-06-04 | End: 2018-12-27

## 2018-06-04 RX ORDER — TRAMADOL HYDROCHLORIDE 50 MG/1
50 TABLET ORAL EVERY 6 HOURS PRN
Qty: 90 TABLET | Refills: 0 | Status: SHIPPED | OUTPATIENT
Start: 2018-06-04 | End: 2018-08-28

## 2018-06-04 RX ORDER — GLIPIZIDE 10 MG/1
10 TABLET ORAL
Qty: 180 TABLET | Refills: 1 | Status: CANCELLED | OUTPATIENT
Start: 2018-06-04

## 2018-06-04 RX ORDER — LISINOPRIL 20 MG/1
20 TABLET ORAL DAILY
Qty: 90 TABLET | Refills: 1 | Status: SHIPPED | OUTPATIENT
Start: 2018-06-04 | End: 2019-01-03

## 2018-06-04 NOTE — MR AVS SNAPSHOT
After Visit Summary   6/4/2018    Devon Watts    MRN: [id number]           Patient Information     Date Of Birth          1950        Visit Information        Provider Department      6/4/2018 2:40 PM Nicole Magdaleno DO [facility]        Today's Diagnoses     Type 2 diabetes mellitus without complication, without long-term current use of insulin (H)    -  1    Benign essential hypertension        Impingement syndrome of both shoulders        Screening for diabetic peripheral neuropathy          Care Instructions    Call me in 3 months for a tramadol refill.     Follow up with me in 6 months for hypertension and diabetes.     If you can, it is helpful if you fill out a survey about your clinic visit if it is mailed to your house in a few weeks    [facility]   Discharged by : Maricruz WESTBROOK Certified Medical Assistant (AAMA)   Paper scripts provided to patient : 1   If you have any questions regarding to your visit please contact your care team:     Team Silver              Clinic Hours Telephone Number     Dr. Girma Byrd PA-C   7am-7pm  Monday - Thursday   7am-5pm  Fridays  [phone]   (Appointment scheduling available 24/7)     RN Line  [phone] option 2     Urgent Care - [place] and [facility] - 11am-9pm Monday-Friday Saturday-Sunday- 9am-5pm     [place] -   5pm-9pm Monday-Friday Saturday-Sunday- 9am-5pm    [phone] - [place]    [phone] - [place]     For a Price Quote for your services, please call our Consumer Price Line at [phone].     What options do I have for visits at the clinic other than the traditional office visit?     To expand how we care for you, many of our providers are utilizing electronic visits (e-visits) and telephone visits, when medically appropriate, for interactions with their patients rather than a visit in the clinic. We also offer  nurse visits for many medical concerns. Just like any other service, we will bill your insurance company for this type of visit based on time spent on the phone with your provider. Not all insurance companies cover these visits. Please check with your medical insurance if this type of visit is covered. You will be responsible for any charges that are not paid by your insurance.   E-visits via PrivateCorehart: generally incur a $35.00 fee.     Telephone visits:   Time spent on the phone: *charged based on time that is spent on the phone in increments of 10 minutes. Estimated cost:   5-10 mins $30.00   11-20 mins. $59.00   21-30 mins. $85.00     Use [employer] (secure email communication and access to your chart) to send your primary care provider a message or make an appointment. Ask someone on your Team how to sign up for [employer].     As always, Thank you for trusting us with your health care needs!      [facility]:    Scheduling Appointments  Carla Ortiz [facility]  Call: [phone]    [facility]  Call: [phone]    [facility]  Call: [phone]    For Gastroenterology referrals   [facility] Gastroenterology   Clinics and Surgery Center, 4th Floor   [address] [place] [address]   Appointments: [phone]    WHERE TO GO FOR CARE?  Clinic    Make an appointment if you:       Are sick (cold, cough, flu, sore throat, earache or in pain).       Have a small injury (sprain, small cut, burn or broken bone).       Need a physical exam, Pap smear, vaccine or prescription refill.       Have questions about your health or medicines.    To reach us:      Call [phone] ([phone]). Open 24 hours every day. (For counseling services, call [phone].)    Log into [employer] at ROI land [email or web address]. (Visit [email or web address] to create an account.) Hospital emergency room    An emergency is a serious or life- threatening problem  that must be treated right away.    Call 911 or get to the hospital if you have:      Very bad or sudden:            - Chest pain or pressure         - Bleeding         - Head or belly pain         - Dizziness or trouble seeing, walking or                          Speaking      Problems breathing      Blood in your vomit or you are coughing up blood      A major injury (knocked out, loss of a finger or limb, rape, broken bone protruding from skin)    A mental health crisis. (Or call the Mental Health Crisis line at 1-151.470.6473 or Suicide Prevention Hotline at 1-856.771.2005.)    Open 24 hours every day. You don't need an appointment.     Urgent care    Visit urgent care for sickness or small injuries when the clinic is closed. You don't need an appointment. To check hours or find an urgent care near you, visit [email or web address]. Online care    Get online care from [facility] for more than 70 common problems, like colds, allergies and infections. Open 24 hours every day at:   [email or web address]   Need help deciding?    For advice about where to be seen, you may call your clinic and ask to speak with a nurse. We're here for you 24 hours every day.         If you are deaf or hard of hearing, please let us know. We provide many free services including sign language interpreters, oral interpreters, TTYs, telephone amplifiers, note takers and written materials.               Follow-ups after your visit        Additional Services     OPTOMETRY REFERRAL       Your provider has referred you to:  [facility]: [facility] [place] [facility] [phone]    [email or web address]    Please be aware that coverage of these services is subject to the terms and limitations of your health insurance plan.  Call member services at your health plan with any benefit or coverage questions.      Please bring the following to your appointment:  >>   Any x-rays, CTs or MRIs which have been performed.  Contact the facility where  "they were done to arrange for  prior to your scheduled appointment.  Any new CT, MRI or other procedures ordered by your specialist must be performed at a [facility] facility or coordinated by your clinic's referral office.    >>   List of current medications   >>   This referral request   >>   Any documents/labs given to you for this referral                  Who to contact     If you have questions or need follow up information about today's clinic visit or your schedule please contact [facility] directly at [phone].  Normal or non-critical lab and imaging results will be communicated to you by MyChart, letter or phone within 4 business days after the clinic has received the results. If you do not hear from us within 7 days, please contact the clinic through Mailjethart or phone. If you have a critical or abnormal lab result, we will notify you by phone as soon as possible.  Submit refill requests through [employer] or call your pharmacy and they will forward the refill request to us. Please allow 3 business days for your refill to be completed.          Additional Information About Your Visit        [employer] Information     [employer] lets you send messages to your doctor, view your test results, renew your prescriptions, schedule appointments and more. To sign up, go to [email or web address] . Click on \"Log in\" on the left side of the screen, which will take you to the Welcome page. Then click on \"Sign up Now\" on the right side of the page.     You will be asked to enter the access code listed below, as well as some personal information. Please follow the directions to create your username and password.     Your access code is: [email or web address]  Expires: 2018  8:29 PM     Your access code will  in 90 days. If you need help or a new code, please call your [facility] clinic or [phone].        Care EveryWhere ID     This is your Care EveryWhere ID. This could be used by other " "organizations to access your [place] medical records  [id number]        Your Vitals Were     Pulse Temperature Height Pulse Oximetry BMI (Body Mass Index)       92 97.8  F (36.6  C) (Oral) 5' 4.5\" (1.638 m) 100% 21.97 kg/m2        Blood Pressure from Last 3 Encounters:   06/04/18 121/68   04/06/18 128/66   11/20/17 120/60    Weight from Last 3 Encounters:   06/04/18 130 lb (59 kg)   04/06/18 133 lb 4 oz (60.4 kg)   11/20/17 133 lb (60.3 kg)              We Performed the Following     Albumin Random Urine Quantitative with Creat Ratio     Basic metabolic panel  (Ca, Cl, CO2, Creat, Gluc, K, Na, BUN)     FOOT EXAM  NO CHARGE [10583.114]     HEMOGLOBIN A1C     Lipid panel reflex to direct LDL Non-fasting     OPTOMETRY REFERRAL          Today's Medication Changes          These changes are accurate as of 6/4/18  3:28 PM.  If you have any questions, ask your nurse or doctor.               These medicines have changed or have updated prescriptions.        Dose/Directions    * glipiZIDE 10 MG tablet   Commonly known as:  GLUCOTROL   This may have changed:  Another medication with the same name was added. Make sure you understand how and when to take each.   Used for:  Type 2 diabetes mellitus without complication, without long-term current use of insulin (H)   Changed by:  Nicole Magdaleno DO        Dose:  10 mg   Take 1 tablet (10 mg) by mouth 2 times daily (before meals)   Quantity:  180 tablet   Refills:  1       * glipiZIDE 10 MG 24 hr tablet   Commonly known as:  glipiZIDE XL   This may have changed:  You were already taking a medication with the same name, and this prescription was added. Make sure you understand how and when to take each.   Used for:  Type 2 diabetes mellitus without complication, without long-term current use of insulin (H)   Changed by:  Nicole Magdaleno DO        Dose:  10 mg   Take 1 tablet (10 mg) by mouth daily   Quantity:  90 tablet   Refills:  1       * traMADol 50 MG tablet   Commonly known " as:  ULTRAM   This may have changed:  Another medication with the same name was changed. Make sure you understand how and when to take each.   Used for:  Shoulder impingement, unspecified laterality   Changed by:  Nicole Magdaleno, DO        TAKE 1 TO 2 TABLETS BY MOUTH EVERY 6 HOURS AS NEEDED FOR PAIN(MAX 8 TABLETS PER DAY) Do not fill before 1/20/18   Quantity:  30 tablet   Refills:  0       * traMADol 50 MG tablet   Commonly known as:  ULTRAM   This may have changed:  Another medication with the same name was changed. Make sure you understand how and when to take each.   Used for:  Shoulder impingement, unspecified laterality   Changed by:  Nicole Magdaleno DO        Dose:  50 mg   Take 1 tablet (50 mg) by mouth every 6 hours as needed for moderate pain Take 1-2 tablets as needed do not fill before 2/23/18   Quantity:  30 tablet   Refills:  0       * traMADol 50 MG tablet   Commonly known as:  ULTRAM   This may have changed:  Another medication with the same name was changed. Make sure you understand how and when to take each.   Used for:  Shoulder impingement, unspecified laterality   Changed by:  Nicole Magdaleno, DO        TAKE 1 TO 2 TABLETS BY MOUTH EVERY 6 HOURS AS NEEDED FOR PAIN. MAX 8 TABLETS DAILY, do not fill before 4/23/18   Quantity:  30 tablet   Refills:  0       * traMADol 50 MG tablet   Commonly known as:  ULTRAM   This may have changed:  See the new instructions.   Used for:  Impingement syndrome of both shoulders   Changed by:  Nicole Magdaleno DO        Dose:  50 mg   Take 1 tablet (50 mg) by mouth every 6 hours as needed for severe pain Needs to last three months.  Fill 6/4/18   Quantity:  90 tablet   Refills:  0       * Notice:  This list has 6 medication(s) that are the same as other medications prescribed for you. Read the directions carefully, and ask your doctor or other care provider to review them with you.         Where to get your medicines      These medications were sent to [facility]  [id number] - [place], [place] - [address] [place] [address] AT [address]  [address] [facility] [address]     Phone:  [phone]     glipiZIDE 10 MG 24 hr tablet    lisinopril 20 MG tablet    metFORMIN 1000 MG tablet         Some of these will need a paper prescription and others can be bought over the counter.  Ask your nurse if you have questions.     Bring a paper prescription for each of these medications     traMADol 50 MG tablet               Information about OPIOIDS     PRESCRIPTION OPIOIDS: WHAT YOU NEED TO KNOW   You have a prescription for an opioid (narcotic) pain medicine. Opioids can cause addiction. If you have a history of chemical dependency of any type, you are at a higher risk of becoming addicted to opioids. Only take this medicine after all other options have been tried. Take it for as short a time and as few doses as possible.     Do not:    Drive. If you drive while taking these medicines, you could be arrested for driving under the influence (DUI).    Operate heavy machinery    Do any other dangerous activities while taking these medicines.     Drink any alcohol while taking these medicines.      Take with any other medicines that contain acetaminophen. Read all labels carefully. Look for the word  acetaminophen  or  Tylenol.  Ask your pharmacist if you have questions or are unsure.    Store your pills in a secure place, locked if possible. We will not replace any lost or stolen medicine. If you don t finish your medicine, please throw away (dispose) as directed by your pharmacist. The [place] Pollution Control Agency has more information about safe disposal: https://www.pca.[facility].[place].us/living-green/managing-unwanted-medications    All opioids tend to cause constipation. Drink plenty of water and eat foods that have a lot of fiber, such as fruits, vegetables, prune juice, apple juice and high-fiber cereal. Take a laxative (Miralax, milk of magnesia, Colace, Senna) if you don t move  your bowels at least every other day.          Primary Care Provider Office Phone # Fax #    Nicole Magdaleno [phone]       [address] [facility] [address]        Equal Access to Services     RANDOLPH ELMORE : Hadii aad ku hadmilorosa Soadileneali, waaxda luqadaha, qaybta kaalmada adeegyada, waxcb nurysin hayaan rachel ladynadja anna. So [facility] [phone].    ATENCIÓN: Si habla español, tiene a nicole disposición servicios gratuitos de asistencia lingüística. Llame al [phone].    We comply with applicable federal civil rights laws and [place] laws. We do not discriminate on the basis of race, color, national origin, age, disability, sex, sexual orientation, or gender identity.            Thank you!     Thank you for choosing [facility]  for your care. Our goal is always to provide you with excellent care. Hearing back from our patients is one way we can continue to improve our services. Please take a few minutes to complete the written survey that you may receive in the mail after your visit with us. Thank you!             Your Updated Medication List - Protect others around you: Learn how to safely use, store and throw away your medicines at www.disposemymeds.org.          This list is accurate as of 6/4/18  3:28 PM.  Always use your most recent med list.                   Brand Name Dispense Instructions for use Diagnosis    * aspirin 325 MG tablet      Take 325 mg by mouth daily        * aspirin 81 MG tablet      Take 81 mg by mouth daily        atorvastatin 10 MG tablet    LIPITOR    90 tablet    Take 1 tablet (10 mg) by mouth daily    Hyperlipidemia with target LDL less than 100       cefuroxime 500 MG tablet    CEFTIN    20 tablet    Take 1 tablet (500 mg) by mouth 2 times daily    Lower resp. tract infection       GAVILYTE-G 236 g suspension   Generic drug:  polyethylene glycol      TK UTD PER HANDOUT        * glipiZIDE 10 MG tablet    GLUCOTROL    180 tablet    Take 1  tablet (10 mg) by mouth 2 times daily (before meals)    Type 2 diabetes mellitus without complication, without long-term current use of insulin (H)       * glipiZIDE 10 MG 24 hr tablet    glipiZIDE XL    90 tablet    Take 1 tablet (10 mg) by mouth daily    Type 2 diabetes mellitus without complication, without long-term current use of insulin (H)       ibuprofen 800 MG tablet    ADVIL/MOTRIN    30 tablet    Take 1 tablet (800 mg) by mouth every 8 hours as needed for moderate pain    Fall, initial encounter, Acute midline thoracic back pain       lisinopril 20 MG tablet    PRINIVIL/ZESTRIL    90 tablet    Take 1 tablet (20 mg) by mouth daily    Benign essential hypertension       metFORMIN 1000 MG tablet    GLUCOPHAGE    180 tablet    Take 1 tablet (1,000 mg) by mouth 2 times daily (with meals)    Type 2 diabetes mellitus without complication, without long-term current use of insulin (H)       * traMADol 50 MG tablet    ULTRAM    30 tablet    TAKE 1 TO 2 TABLETS BY MOUTH EVERY 6 HOURS AS NEEDED FOR PAIN(MAX 8 TABLETS PER DAY) Do not fill before 1/20/18    Shoulder impingement, unspecified laterality       * traMADol 50 MG tablet    ULTRAM    30 tablet    Take 1 tablet (50 mg) by mouth every 6 hours as needed for moderate pain Take 1-2 tablets as needed do not fill before 2/23/18    Shoulder impingement, unspecified laterality       * traMADol 50 MG tablet    ULTRAM    30 tablet    TAKE 1 TO 2 TABLETS BY MOUTH EVERY 6 HOURS AS NEEDED FOR PAIN. MAX 8 TABLETS DAILY, do not fill before 4/23/18    Shoulder impingement, unspecified laterality       * traMADol 50 MG tablet    ULTRAM    90 tablet    Take 1 tablet (50 mg) by mouth every 6 hours as needed for severe pain Needs to last three months.  Fill 6/4/18    Impingement syndrome of both shoulders       * Notice:  This list has 8 medication(s) that are the same as other medications prescribed for you. Read the directions carefully, and ask your doctor or other care  provider to review them with you.

## 2018-06-04 NOTE — PATIENT INSTRUCTIONS
Call me in 3 months for a tramadol refill.     Follow up with me in 6 months for hypertension and diabetes.     If you can, it is helpful if you fill out a survey about your clinic visit if it is mailed to your house in a few weeks    [facility]   Discharged by : Maricruz WESTBROOK Certified Medical Assistant (AAMA)   Paper scripts provided to patient : 1   If you have any questions regarding to your visit please contact your care team:     Team Silver              Clinic Hours Telephone Number     Dr. Girma Byrd PA-C   7am-7pm  Monday - Thursday   7am-5pm  Fridays  [phone]   (Appointment scheduling available 24/7)     RN Line  [phone] option 2     Urgent Care - [place] - 11am-9pm Monday-Friday Saturday-Sunday- 9am-5pm     [place] -   5pm-9pm Monday-Friday Saturday-Sunday- 9am-5pm    [phone] - [place]    [phone] - [place]     For a Price Quote for your services, please call our Consumer Price Line at [phone].     What options do I have for visits at the clinic other than the traditional office visit?     To expand how we care for you, many of our providers are utilizing electronic visits (e-visits) and telephone visits, when medically appropriate, for interactions with their patients rather than a visit in the clinic. We also offer nurse visits for many medical concerns. Just like any other service, we will bill your insurance company for this type of visit based on time spent on the phone with your provider. Not all insurance companies cover these visits. Please check with your medical insurance if this type of visit is covered. You will be responsible for any charges that are not paid by your insurance.   E-visits via [employer]: generally incur a $35.00 fee.     Telephone visits:   Time spent on the phone: *charged based on time that is spent on the phone in increments of 10  minutes. Estimated cost:   5-10 mins $30.00   11-20 mins. $59.00   21-30 mins. $85.00     Use [employer] (secure email communication and access to your chart) to send your primary care provider a message or make an appointment. Ask someone on your Team how to sign up for [employer].     As always, Thank you for trusting us with your health care needs!      [place] [facility]:    Scheduling Appointments  [place] [place]  Call: [phone]    [facility]  Call: [phone]    [facility]  Call: [phone]    For Gastroenterology referrals   [facility] Gastroenterology   Clinics and Surgery Center, 4th Floor   [address] [place] [address]   Appointments: [phone]    WHERE TO GO FOR CARE?  Clinic    Make an appointment if you:       Are sick (cold, cough, flu, sore throat, earache or in pain).       Have a small injury (sprain, small cut, burn or broken bone).       Need a physical exam, Pap smear, vaccine or prescription refill.       Have questions about your health or medicines.    To reach us:      Call [phone] ([phone]). Open 24 hours every day. (For counseling services, call [phone].)    Log into [employer] at [email or web address]. (Visit [email or web address] to create an account.) Hospital emergency room    An emergency is a serious or life- threatening problem that must be treated right away.    Call 209 or get to the hospital if you have:      Very bad or sudden:            - Chest pain or pressure         - Bleeding         - Head or belly pain         - Dizziness or trouble seeing, walking or                          Speaking      Problems breathing      Blood in your vomit or you are coughing up blood      A major injury (knocked out, loss of a finger or limb, rape, broken bone protruding from skin)    A mental health crisis. (Or call the Mental Health Crisis line at 1-473.816.6998 or Suicide  Prevention Hotline at 1-788.932.2744.)    Open 24 hours every day. You don't need an appointment.     Urgent care    Visit urgent care for sickness or small injuries when the clinic is closed. You don't need an appointment. To check hours or find an urgent care near you, visit [email or web address]. Online care    Get online care from [facility] for more than 70 common problems, like colds, allergies and infections. Open 24 hours every day at:   [email or web address]   Need help deciding?    For advice about where to be seen, you may call your clinic and ask to speak with a nurse. We're here for you 24 hours every day.         If you are deaf or hard of hearing, please let us know. We provide many free services including sign language interpreters, oral interpreters, TTYs, telephone amplifiers, note takers and written materials.

## 2018-06-05 LAB
ANION GAP SERPL CALCULATED.3IONS-SCNC: 5 MMOL/L (ref 3–14)
BUN SERPL-MCNC: 16 MG/DL (ref 7–30)
CALCIUM SERPL-MCNC: 9.1 MG/DL (ref 8.5–10.1)
CHLORIDE SERPL-SCNC: 111 MMOL/L (ref 94–109)
CHOLEST SERPL-MCNC: 148 MG/DL
CO2 SERPL-SCNC: 30 MMOL/L (ref 20–32)
CREAT SERPL-MCNC: 1.24 MG/DL (ref 0.66–1.25)
CREAT UR-MCNC: 138 MG/DL
GFR SERPL CREATININE-BSD FRML MDRD: 58 ML/MIN/1.7M2
GLUCOSE SERPL-MCNC: 105 MG/DL (ref 70–99)
HDLC SERPL-MCNC: 52 MG/DL
LDLC SERPL CALC-MCNC: 86 MG/DL
MICROALBUMIN UR-MCNC: 12 MG/L
MICROALBUMIN/CREAT UR: 8.55 MG/G CR (ref 0–17)
NONHDLC SERPL-MCNC: 96 MG/DL
POTASSIUM SERPL-SCNC: 4.3 MMOL/L (ref 3.4–5.3)
SODIUM SERPL-SCNC: 146 MMOL/L (ref 133–144)
TRIGL SERPL-MCNC: 52 MG/DL

## 2018-08-07 DIAGNOSIS — I10 BENIGN ESSENTIAL HYPERTENSION: ICD-10-CM

## 2018-08-07 RX ORDER — LISINOPRIL 20 MG/1
TABLET ORAL
Qty: 90 TABLET | Refills: 0 | Status: SHIPPED | OUTPATIENT
Start: 2018-08-07 | End: 2019-01-03

## 2018-08-07 NOTE — TELEPHONE ENCOUNTER
"LOV- 6/4. Should have refill remaining at the same pharmacy. Resent.   Irena Govea RN    Requested Prescriptions   Pending Prescriptions Disp Refills     lisinopril (PRINIVIL/ZESTRIL) 20 MG tablet [Pharmacy Med Name: LISINOPRIL 20MG TABLETS] 90 tablet 0     Sig: TAKE 1 TABLET(20 MG) BY MOUTH DAILY    ACE Inhibitors (Including Combos) Protocol Passed    8/7/2018  9:42 AM       Passed - Blood pressure under 140/90 in past 12 months    BP Readings from Last 3 Encounters:   06/04/18 121/68   04/06/18 128/66   11/20/17 120/60                Passed - Recent (12 mo) or future (30 days) visit within the authorizing provider's specialty    Patient had office visit in the last 12 months or has a visit in the next 30 days with authorizing provider or within the authorizing provider's specialty.  See \"Patient Info\" tab in inbasket, or \"Choose Columns\" in Meds & Orders section of the refill encounter.           Passed - Patient is age 18 or older       Passed - Normal serum creatinine on file in past 12 months    Recent Labs   Lab Test  06/04/18   1447   CR  1.24            Passed - Normal serum potassium on file in past 12 months    Recent Labs   Lab Test  06/04/18   1447   POTASSIUM  4.3               "

## 2018-08-28 DIAGNOSIS — M75.42 IMPINGEMENT SYNDROME OF BOTH SHOULDERS: ICD-10-CM

## 2018-08-28 DIAGNOSIS — M75.41 IMPINGEMENT SYNDROME OF BOTH SHOULDERS: ICD-10-CM

## 2018-08-28 RX ORDER — TRAMADOL HYDROCHLORIDE 50 MG/1
TABLET ORAL
Qty: 90 TABLET | Refills: 0 | Status: SHIPPED | OUTPATIENT
Start: 2018-08-28 | End: 2018-11-10

## 2018-08-28 NOTE — TELEPHONE ENCOUNTER
Medication Detail      Disp Refills Start End GEORGINA   traMADol (ULTRAM) 50 MG tablet 90 tablet 0 6/4/2018  No   Sig - Route: Take 1 tablet (50 mg) by mouth every 6 hours as needed for severe pain Needs to last three months.  Fill 6/4/18 - Oral   Class: Local Print   Order: [id number]   Prior authorization: Closed - Electronic Prior Authorization not supported. Submit via other methods.       Last Office Visit: 6/4/2018  Future Office visit:       Routing refill request to provider for review/approval because:  Drug not on the [facility], [facility] or  Health refill protocol or controlled substance

## 2018-12-27 ENCOUNTER — TELEPHONE (OUTPATIENT)
Dept: FAMILY MEDICINE | Facility: CLINIC | Age: 68
End: 2018-12-27

## 2018-12-27 DIAGNOSIS — E11.9 TYPE 2 DIABETES MELLITUS WITHOUT COMPLICATION, WITHOUT LONG-TERM CURRENT USE OF INSULIN (H): ICD-10-CM

## 2018-12-28 RX ORDER — GLIPIZIDE 10 MG/1
TABLET, FILM COATED, EXTENDED RELEASE ORAL
Qty: 30 TABLET | Refills: 0 | Status: SHIPPED | OUTPATIENT
Start: 2018-12-28 | End: 2019-01-03

## 2018-12-28 NOTE — TELEPHONE ENCOUNTER
"Requested Prescriptions   Pending Prescriptions Disp Refills     glipiZIDE (GLUCOTROL XL) 10 MG 24 hr tablet [Pharmacy Med Name: GLIPIZIDE ER 10MG TABLETS]  Last Written Prescription Date:  6/4/2018  Last Fill Quantity: 90 tablet,  # refills: 1   Last office visit: 6/4/2018 with prescribing provider:  SHANITA Magdaleno   Future Office Visit:     90 tablet 0     Sig: TAKE 1 TABLET(10 MG) BY MOUTH DAILY    Sulfonylurea Agents Failed - 12/27/2018  7:44 PM       Failed - Blood pressure less than 140/90 in past 6 months    BP Readings from Last 3 Encounters:   06/04/18 121/68   04/06/18 128/66   11/20/17 120/60          Failed - Patient has documented A1c within the specified period of time.    If HgbA1C is 8 or greater, it needs to be on file within the past 3 months.  If less than 8, must be on file within the past 6 months.     Recent Labs   Lab Test 06/04/18  1447   A1C 5.9*            Failed - Recent (6 mo) or future (30 days) visit within the authorizing provider's specialty    Patient had office visit in the last 6 months or has a visit in the next 30 days with authorizing provider or within the authorizing provider's specialty.  See \"Patient Info\" tab in inbasket, or \"Choose Columns\" in Meds & Orders section of the refill encounter.           Passed - Patient has documented LDL within the past 12 mos.    Recent Labs   Lab Test 06/04/18  1447   LDL 86            Passed - Patient has had a Microalbumin in the past 12 mos.    Recent Labs   Lab Test 06/04/18  1449   MICROL 12   UMALCR 8.55            Passed - Patient is age 18 or older       Passed - Patient has a recent creatinine (normal) within the past 12 mos.    Recent Labs   Lab Test 06/04/18  1447   CR 1.24               "

## 2019-01-03 ENCOUNTER — OFFICE VISIT (OUTPATIENT)
Dept: FAMILY MEDICINE | Facility: CLINIC | Age: 69
End: 2019-01-03
Payer: COMMERCIAL

## 2019-01-03 VITALS
BODY MASS INDEX: 22.66 KG/M2 | DIASTOLIC BLOOD PRESSURE: 64 MMHG | WEIGHT: 136 LBS | SYSTOLIC BLOOD PRESSURE: 132 MMHG | HEART RATE: 84 BPM | TEMPERATURE: 98.3 F | HEIGHT: 65 IN

## 2019-01-03 DIAGNOSIS — M25.519 CHRONIC SHOULDER PAIN, UNSPECIFIED LATERALITY: ICD-10-CM

## 2019-01-03 DIAGNOSIS — M75.42 IMPINGEMENT SYNDROME OF BOTH SHOULDERS: ICD-10-CM

## 2019-01-03 DIAGNOSIS — E11.9 TYPE 2 DIABETES MELLITUS WITHOUT COMPLICATION, WITHOUT LONG-TERM CURRENT USE OF INSULIN (H): Primary | ICD-10-CM

## 2019-01-03 DIAGNOSIS — Z13.5 SCREENING FOR DIABETIC RETINOPATHY: ICD-10-CM

## 2019-01-03 DIAGNOSIS — M75.41 IMPINGEMENT SYNDROME OF BOTH SHOULDERS: ICD-10-CM

## 2019-01-03 DIAGNOSIS — G89.29 CHRONIC SHOULDER PAIN, UNSPECIFIED LATERALITY: ICD-10-CM

## 2019-01-03 DIAGNOSIS — I10 BENIGN ESSENTIAL HYPERTENSION: ICD-10-CM

## 2019-01-03 LAB
AMPHETAMINES UR QL: NOT DETECTED NG/ML
BARBITURATES UR QL SCN: NOT DETECTED NG/ML
BENZODIAZ UR QL SCN: NOT DETECTED NG/ML
BUPRENORPHINE UR QL: NOT DETECTED NG/ML
CANNABINOIDS UR QL: NOT DETECTED NG/ML
COCAINE UR QL SCN: NOT DETECTED NG/ML
D-METHAMPHET UR QL: NOT DETECTED NG/ML
HBA1C MFR BLD: 6.2 % (ref 0–5.6)
METHADONE UR QL SCN: NOT DETECTED NG/ML
OPIATES UR QL SCN: NOT DETECTED NG/ML
OXYCODONE UR QL SCN: NOT DETECTED NG/ML
PCP UR QL SCN: NOT DETECTED NG/ML
PROPOXYPH UR QL: NOT DETECTED NG/ML
TRICYCLICS UR QL SCN: NOT DETECTED NG/ML

## 2019-01-03 PROCEDURE — 80306 DRUG TEST PRSMV INSTRMNT: CPT | Performed by: FAMILY MEDICINE

## 2019-01-03 PROCEDURE — 36415 COLL VENOUS BLD VENIPUNCTURE: CPT | Performed by: FAMILY MEDICINE

## 2019-01-03 PROCEDURE — 99214 OFFICE O/P EST MOD 30 MIN: CPT | Performed by: FAMILY MEDICINE

## 2019-01-03 PROCEDURE — 83036 HEMOGLOBIN GLYCOSYLATED A1C: CPT | Performed by: FAMILY MEDICINE

## 2019-01-03 RX ORDER — LISINOPRIL 20 MG/1
20 TABLET ORAL DAILY
Qty: 90 TABLET | Refills: 1 | Status: SHIPPED | OUTPATIENT
Start: 2019-01-03 | End: 2019-11-19

## 2019-01-03 RX ORDER — GLIPIZIDE 10 MG/1
10 TABLET, FILM COATED, EXTENDED RELEASE ORAL DAILY
Qty: 90 TABLET | Refills: 1 | Status: CANCELLED | OUTPATIENT
Start: 2019-01-03

## 2019-01-03 RX ORDER — TRAMADOL HYDROCHLORIDE 50 MG/1
50 TABLET ORAL EVERY 6 HOURS PRN
Qty: 90 TABLET | Refills: 0 | Status: SHIPPED | OUTPATIENT
Start: 2019-01-03 | End: 2019-05-02

## 2019-01-03 ASSESSMENT — MIFFLIN-ST. JEOR: SCORE: 1300.83

## 2019-01-03 NOTE — PROGRESS NOTES
SUBJECTIVE:   Devon Watts is a 68 year old male who presents to clinic today for the following health issues:      Diabetes Follow-up    Patient is checking blood sugars: once daily.  Results are as follows:         am - 109 this morning     Diabetic concerns: None     Symptoms of hypoglycemia (low blood sugar): none     Paresthesias (numbness or burning in feet) or sores: No     Date of last diabetic eye exam: Has an appointment this month   Lab Results   Component Value Date    A1C 6.2 01/03/2019    A1C 5.9 06/04/2018    A1C 6.4 11/20/2017    A1C 6.4 04/18/2017    A1C 6.4 09/09/2016           Diabetes Management Resources    Hyperlipidemia Follow-Up      Rate your low fat/cholesterol diet?: not monitoring fat    Taking statin?  No    Other lipid medications/supplements?:  None  LDL Cholesterol Calculated   Date Value Ref Range Status   06/04/2018 86 <100 mg/dL Final     Comment:     Desirable:       <100 mg/dl         Hypertension Follow-up      Outpatient blood pressures are being checked at home.  Results are 120's over 60.    Low Salt Diet: low salt    BP Readings from Last 2 Encounters:   06/04/18 121/68   04/06/18 128/66     Hemoglobin A1C (%)   Date Value   06/04/2018 5.9 (H)   11/20/2017 6.4 (H)     LDL Cholesterol Calculated (mg/dL)   Date Value   06/04/2018 86     LDL Cholesterol Direct (mg/dL)   Date Value   06/22/2017 77   09/09/2016 86       Amount of exercise or physical activity: 6-7 days/week for an average of 15-30 minutes    Problems taking medications regularly: No    Medication side effects: none    Diet: low salt and diabetic    He has some leg pain from prolonged standing at work.  He has done PT which helps.          Problem list and histories reviewed & adjusted, as indicated.  Additional history: as documented    BP Readings from Last 3 Encounters:   01/03/19 132/64   06/04/18 121/68   04/06/18 128/66    Wt Readings from Last 3 Encounters:   01/03/19 61.7 kg (136 lb)   06/04/18 59 kg  "(130 lb)   04/06/18 60.4 kg (133 lb 4 oz)           Reviewed and updated as needed this visit by clinical staff  Tobacco  Allergies  Meds  Med Hx  Surg Hx  Fam Hx  Soc Hx      Reviewed and updated as needed this visit by Provider       ROS:  Constitutional, HEENT, cardiovascular, pulmonary, GI, , musculoskeletal, neuro, skin, endocrine and psych systems are negative, except as otherwise noted.    OBJECTIVE:     /64 (Cuff Size: Adult Regular)   Pulse 84   Temp 98.3  F (36.8  C) (Oral)   Ht 1.638 m (5' 4.5\")   Wt 61.7 kg (136 lb)   BMI 22.98 kg/m    Body mass index is 22.98 kg/m .  GENERAL: healthy, alert and no distress  HENT: normal cephalic/atraumatic, oropharynx clear and oral mucous membranes moist  NECK: no adenopathy, no asymmetry, masses, or scars and thyroid normal to palpation  RESP: lungs clear to auscultation - no rales, rhonchi or wheezes  CV: regular rate and rhythm, normal S1 S2, no S3 or S4, no murmur, click or rub, no peripheral edema and peripheral pulses strong  MS: no gross musculoskeletal defects noted, no edema, calves tight but nontender bilaterally  SKIN: no suspicious lesions or rashes  NEURO: Normal strength and tone, mentation intact and speech normal  PSYCH: mentation appears normal, affect normal/bright  Diabetic foot exam: normal DP and PT pulses, no trophic changes or ulcerative lesions, normal sensory exam and normal monofilament exam    Diagnostic Test Results:  none     ASSESSMENT/PLAN:     Diabetes Type II, A1c Controlled, non-insulin dependent   Associated with the following complications    Renal Complications:  None    Ophthalmologic Complications: None    Neurologic Complications: None    Peripheral Vascular Complications:  None    Other: None   Plan:  No changes in the patient's current treatment plan      Hypertension; controlled   Associated with the following complications:    DM   Plan:  No changes in the patient's current treatment " plan      ASSESSMENT/PLAN:      ICD-10-CM    1. Type 2 diabetes mellitus without complication, without long-term current use of insulin (H) E11.9 HEMOGLOBIN A1C     metFORMIN (GLUCOPHAGE) 1000 MG tablet   2. Benign essential hypertension I10 lisinopril (PRINIVIL/ZESTRIL) 20 MG tablet   3. Impingement syndrome of both shoulders M75.41 traMADol (ULTRAM) 50 MG tablet    M75.42    4. Screening for diabetic retinopathy Z13.5 OPHTHALMOLOGY ADULT REFERRAL   5. Chronic shoulder pain, unspecified laterality M25.519 Drug Abuse Screen Panel 13, Urine (Pain Care Package)    G89.29        Patient Instructions     [facility]   Discharged by : Maricruz WESTBROOK Certified Medical Assistant (AAMA)   Paper scripts provided to patient : 1   If you have any questions regarding to your visit please contact your care team:     Team Silver              Clinic Hours Telephone Number     Dr. Nicole Byrd PA-C   7am-7pm  Monday - Thursday   7am-5pm  Fridays  [phone]   (Appointment scheduling available 24/7)     RN Line  [phone] option 2     Urgent Care - [facility] and [facility] [place] - 11am-9pm Monday-Friday Saturday-Sunday- 9am-5pm     [place] -   5pm-9pm Monday-Friday Saturday-Sunday- 9am-5pm    [phone] - [facility]    [phone] - [place]     For a Price Quote for your services, please call our Consumer Price Line at [phone].     What options do I have for visits at the clinic other than the traditional office visit?     To expand how we care for you, many of our providers are utilizing electronic visits (e-visits) and telephone visits, when medically appropriate, for interactions with their patients rather than a visit in the clinic. We also offer nurse visits for many medical concerns. Just like any other service, we will bill your insurance company for this type of visit based on time spent on the phone with your provider. Not all insurance  companies cover these visits. Please check with your medical insurance if this type of visit is covered. You will be responsible for any charges that are not paid by your insurance.   E-visits via [employer]: generally incur a $35.00 fee.     Telephone visits:  Time spent on the phone: *charged based on time that is spent on the phone in increments of 10 minutes. Estimated cost:   5-10 mins $30.00   11-20 mins. $59.00   21-30 mins. $85.00       Use [employer] (secure email communication and access to your chart) to send your primary care provider a message or make an appointment. Ask someone on your Team how to sign up for [employer]a.   As always, Thank you for trusting us with your health care needs!    [facility]:    Scheduling Appointments  [place] [place] [facility] [place]  Call: [phone]    [facility]  Call: [phone]    [facility]  Call: [phone]    For Gastroenterology referrals   [facility]   Clinics and Surgery Center, 4th Floor   [address] [place] [id number]   Appointments: [phone]    WHERE TO GO FOR CARE?  Clinic    Make an appointment if you:       Are sick (cold, cough, flu, sore throat, earache or in pain).       Have a small injury (sprain, small cut, burn or broken bone).       Need a physical exam, Pap smear, vaccine or prescription refill.       Have questions about your health or medicines.    To reach us:      Call [phone] ([phone]). Open 24 hours every day. (For counseling services, call [phone].)    Log into Discera at [email or web address]. (Visit [email or web address] to create an account.) Hospital emergency room    An emergency is a serious or life- threatening problem that must be treated right away.    Call [phone] or get to the hospital if you have:      Very bad or sudden:            - Chest pain or pressure         - Bleeding         - Head or belly pain         -  Dizziness or trouble seeing, walking or                          Speaking      Problems breathing      Blood in your vomit or you are coughing up blood      A major injury (knocked out, loss of a finger or limb, rape, broken bone protruding from skin)    A mental health crisis. (Or call the Mental Health Crisis line at 1-538.817.3114 or Suicide Prevention Hotline at 1-483.304.8123.)    Open 24 hours every day. You don't need an appointment.     Urgent care    Visit urgent care for sickness or small injuries when the clinic is closed. You don't need an appointment. To check hours or find an urgent care near you, visit [email or web address]. Online care    Get online care from [facility] for more than 70 common problems, like colds, allergies and infections. Open 24 hours every day at:   [email or web address]   Need help deciding?    For advice about where to be seen, you may call your clinic and ask to speak with a nurse. We're here for you 24 hours every day.         If you are deaf or hard of hearing, please let us know. We provide many free services including sign language interpreters, oral interpreters, TTYs, telephone amplifiers, note takers and written materials.             ICD-10-CM    1. Type 2 diabetes mellitus without complication, without long-term current use of insulin (H) E11.9 HEMOGLOBIN A1C     metFORMIN (GLUCOPHAGE) 1000 MG tablet   2. Benign essential hypertension I10 lisinopril (PRINIVIL/ZESTRIL) 20 MG tablet   3. Impingement syndrome of both shoulders M75.41 traMADol (ULTRAM) 50 MG tablet    M75.42    4. Screening for diabetic retinopathy Z13.5 OPHTHALMOLOGY ADULT REFERRAL   5. Chronic shoulder pain, unspecified laterality M25.519 Drug Abuse Screen Panel 13, Urine (Pain Care Package)    G89.29            Nicole Magdaleno DO  [facility]

## 2019-01-03 NOTE — LETTER
[facility]  01/03/19    Patient: Devon Watts  YOB: 1950  Medical Record Number: [id number]                                                                  Opioid / Opioid Plus Controlled Substance Agreement    I understand that my care provider has prescribed an opioid (narcotic) controlled substance to help manage my condition(s). I am taking this medicine to help me function or work. I know this is strong medicine, and that it can cause serious side effects. Opioid medicine can be sedating, addicting and may cause a dependency on the drug. They can affect my ability to drive or think, and cause depression. They need to be taken exactly as prescribed. Combining opioids with certain medicines or chemicals (such as cocaine, sedatives and tranquilizers, sleeping pills, meth) can be dangerous or even fatal. Also, if I stop opioids suddenly, I may have severe withdrawal symptoms. Last, I understand that opioids do not work for all types of pain nor for all patients. If not helpful, I may be asked to stop them.      The risks, benefits, and side effects of these medicine(s) were explained to me. I agree that:    1. I will take part in other treatments as advised by my care team. This may be psychiatry or counseling, physical therapy, behavioral therapy, group treatment or a referral to a pain clinic. I will reduce or stop my medicine when my care team tells me to do so.  2. I will take my medicines as prescribed. I will not change the dose or schedule unless my care team tells me to. There will be no refills if I  run out early.   I may be contactedwithout warning and asked to complete a urine drug test or pill count at any time.   3. I will keep all my appointments, and understand this is part of the monitoring of opioids. My care team may require an office visit for EVERY opioid/controlled substance refill. If I miss appointments or don t follow instructions, my care team may stop  my medicine.  4. I will not ask other providers to prescribe controlled substances, and I will not accept controlled substances from other people. If I need another prescribed controlled substance for a new reason, I will tell my care team within 1 business day.  5. I will use one pharmacy to fill all of my controlled substance prescriptions, and it is up to me to make sure that I do not run out of my medicines on weekends or holidays. If my care team is willing to refill my opioid prescription without a visit, I must request refills only during office hours, refills may take up to 3 days to process, and it may take up to 5 to 7 days for my medicine to be mailed and ready at my pharmacy. Prescriptions will not be mailed anywhere except my pharmacy.        [id number]  Rev 12/18         Registration to scan to EHR                             Page 1 of 2               Controlled Substance Agreement Opioid        [facility]  01/03/19  Patient: Devon Watts  YOB: 1950  Medical Record Number: [id number]                                                                  6. I am responsible for my prescriptions. If the medicine/prescription is lost or stolen, it will not be replaced. I also agree not to share controlled substance medicines with anyone.  7. I agree to not use ANY illegal or recreational drugs. This includes marijuana, cocaine, bath salts or other drugs. I agree not to use alcohol unless my care team says I may.          I agree to give urine samples whenever asked. If I don t give a urine sample, the care team may stop my medicine.    8. If I enroll in the [place] Medical Marijuana program, I will tell my care team. I will also sign an agreement to share my medical records with my care team.   9. I will bring in my list of medicines (or my medicine bottles) each time I come to the clinic.   10. I will tell my care team right away if I become pregnant or have a new medical  problem treated outside of my regular clinic.  11. I understand that this medicine can affect my thinking and judgment. It may be unsafe for me to drive, use machinery and do dangerous tasks. I will not do any of these things until I know how the medicine affects me. If my dose changes, I will wait to see how it affects me. I will contact my care team if I have concerns about medicine side effects.    I understand that if I do not follow any of the conditions above, my prescriptions or treatment may be stopped.      I agree that my provider, clinic care team, and pharmacy may work with any city, state or federal law enforcement agency that investigates the misuse, sale, or other diversion of my controlled medicine. I will allow my provider to discuss my care with or share a copy of this agreement with any other treating provider, pharmacy or emergency room where I receive care. I agree to give up (waive) any right of privacy or confidentiality with respect to these consents.     I have read this agreement and have asked questions about anything I did not understand.      ________________________________________________________________________  Patient signature - Date/Time -  Devon Watts                                      ________________________________________________________________________  Witness signature                                                            ___  _____________________________________________________________________  Provider signature - Nicole Magdaleno DO      [id number]  Rev 12/18         Registration to scan to EHR                         Page 2 of 2                   Controlled Substance Agreement Opioid           Page 1 of 2  Opioid Pain Medicines (also known as Narcotics)  What You Need to Know    What are opioids?   Opioids are pain medicines that must be prescribed by a doctor.  They are also known as narcotics.    Examples are:     morphine (MS Contin, Kaylah)    oxycodone  (Oxycontin)    oxycodone and acetaminophen (Percocet)    hydrocodone and acetaminophen (Vicodin, Norco)     fentanyl patch (Duragesic)     hydromorphone (Dilaudid)     methadone     What do opioids do well?   Opioids are best for short-term pain after a surgery or injury. They also work well for cancer pain. Unlike other pain medicines, they do not cause liver or kidney failure or ulcers. They may help some people with long-lasting (chronic) pain.     What do opioids NOT do well?   Opioids never get rid of pain entirely, and they do not work well for most patients with chronic pain. Opioids do not reduce swelling, one of the causes of pain. They also don t work well for nerve pain.                           For informational purposes only.  Not to replace the advice of your care provider.  Copyright 201 [facility]. All right reserved. [employer] [id number] 02/18.      Page 2 of 2    Risks and side effects   Talk to your doctor before you start or decide to keep taking one of these medicines. Side effects include:    Lowering your breathing rate enough to cause death    Overdose, including death, especially if taking higher than prescribed doses    Long-term opioid use    Worse depression symptoms; less pleasure in things you usually enjoy    Feeling tired or sluggish    Slower thoughts or cloudy thinking    Being more sensitive to pain over time; pain is harder to control    Trouble sleeping or restless sleep    Changes in hormone levels (for example, less testosterone)    Changes in sex drive or ability to have sex    Constipation    Unsafe driving    Itching and sweating    Feeling dizzy    Nausea, vomiting and dry mouth    What else should I know about opioids?  When someone takes opioids for too long or too often, they become dependent. This means that if you stop or reduce the medicine too quickly, you will have withdrawal symptoms.    Dependence is not the same as addiction. Addiction is when  people keep using a substance that harms their body, their mind or their relations with others. If you have a history of drug or alcohol abuse, taking opioids can cause a relapse.    Over time, opioids don t work as well. Most people will need higher and higher doses. The higher the dose, the more serious the side effects. We don t know the long-term effects of opioids.      Prescribed opioids aren't the best way to manage chronic pain    Other ways to manage pain include:      Ibuprofen or acetaminophen.  You should always try this first.      Treat health problems that may be causing pain.      acupuncture or massage, deep breathing, meditation, visual imagery, aromatherapy.      Use heat or ice at the pain site      Physical therapy and exercise      Stop smoking      See a counselor or therapist                                                  People who have used opioids for a long time may have a lower quality of life, worse depression, higher levels of pain and more visits to doctors.    Never share your opioids with others. Be sure to store opioids in a secure place, locked if possible.Young children can easily swallow them and overdose.     You can overdose on opioids.  Signs of overdose include decrease or loss of consciousness, slowed breathing, trouble waking and blue lips.  If someone is worried about overdose, they should call 911.    If you are at risk for overdose, you may get naloxone (Narcan, a medicine that reverses the effects of opioids.  If you overdose, a friend or family member can give you Narcan while waiting for the ambulance.  They need to know the signs of overdose and how to give Narcan.    While you're taking opioids:    Don't use alcohol or street drugs. Taking them together can cause death.    Don't take any of these medicines unless your doctor says its okay.  Taking these with opioids can cause death.    Benzodiazepines (such as lorazepam         or diazepam)    Muscle relaxers  (such as cyclobenzaprine)    sleeping pills    other opioids    Safe disposal of opioids  Find your area drug take-back program, your pharmacy mail-back program, buy a special disposal bag (such as Deterra) from your pharmacy or flush them down the toilet.  Use the guidelines at:  www.fda.gov/drugs/resourcesforyou

## 2019-01-03 NOTE — LETTER
January 4, 2019      Devon Watts  [address] [facility] [address]        Dear Devon,       The results of your recent lab tests were within normal limits. Enclosed is a copy of these results. If you have any further questions or problems, please contact our office.       Sincerely,        Nicole Magdaleno, DO    Results for orders placed or performed in visit on 01/03/19   HEMOGLOBIN A1C   Result Value Ref Range    Hemoglobin A1C 6.2 (H) 0 - 5.6 %   Drug Abuse Screen Panel 13, Urine (Pain Care Package)   Result Value Ref Range    Cannabinoids (97-jtl-7-carboxy-9-THC) Not Detected NDET^Not Detected ng/mL    Phencyclidine (Phencyclidine) Not Detected NDET^Not Detected ng/mL    Cocaine (Benzoylecgonine) Not Detected NDET^Not Detected ng/mL    Methamphetamine (d-Methamphetamine) Not Detected NDET^Not Detected ng/mL    Opiates (Morphine) Not Detected NDET^Not Detected ng/mL    Amphetamine (d-Amphetamine) Not Detected NDET^Not Detected ng/mL    Benzodiazepines (Nordiazepam) Not Detected NDET^Not Detected ng/mL    Tricyclic Antidepressants (Desipramine) Not Detected NDET^Not Detected ng/mL    Methadone (Methadone) Not Detected NDET^Not Detected ng/mL    Barbiturates (Butalbital) Not Detected NDET^Not Detected ng/mL    Oxycodone (Oxycodone) Not Detected NDET^Not Detected ng/mL    Propoxyphene (Norpropoxyphene) Not Detected NDET^Not Detected ng/mL    Buprenorphine (Buprenorphine) Not Detected NDET^Not Detected ng/mL

## 2019-01-03 NOTE — PATIENT INSTRUCTIONS
[facility]   Discharged by : Maricruz WESTBROOK Certified Medical Assistant (AAMA)   Paper scripts provided to patient : 1   If you have any questions regarding to your visit please contact your care team:     Team Silver              Clinic Hours Telephone Number     Dr. Nicole Byrd PA-C   7am-7pm  Monday - Thursday   7am-5pm  Fridays  [phone]   (Appointment scheduling available 24/7)     RN Line  [phone] option 2     Urgent Care - [place] and [place] [facility] - 11am-9pm Monday-Friday Saturday-Sunday- 9am-5pm     [place] -   5pm-9pm Monday-Friday Saturday-Sunday- 9am-5pm    [phone] - [place] [facility]    [phone] - [place]     For a Price Quote for your services, please call our Probity Price Line at [phone].     What options do I have for visits at the clinic other than the traditional office visit?     To expand how we care for you, many of our providers are utilizing electronic visits (e-visits) and telephone visits, when medically appropriate, for interactions with their patients rather than a visit in the clinic. We also offer nurse visits for many medical concerns. Just like any other service, we will bill your insurance company for this type of visit based on time spent on the phone with your provider. Not all insurance companies cover these visits. Please check with your medical insurance if this type of visit is covered. You will be responsible for any charges that are not paid by your insurance.   E-visits via CelePost: generally incur a $35.00 fee.     Telephone visits:  Time spent on the phone: *charged based on time that is spent on the phone in increments of 10 minutes. Estimated cost:   5-10 mins $30.00   11-20 mins. $59.00   21-30 mins. $85.00       Use [facility] (secure email communication and access to your chart) to send your primary care provider a message or make an appointment. Ask someone on your Team how to  sign up for [email or web address].   As always, Thank you for trusting us with your health care needs!    [facility]:    Scheduling Appointments  Carla Ortiz [facility]  Call: [phone]    Juan Jose Hutson [facility]  Call: [phone]    [facility]  Call: [phone]    For Gastroenterology referrals   [facility]   Clinics and Surgery Center, 4th Floor   [address] [place] [address]   Appointments: [phone]    WHERE TO GO FOR CARE?  Clinic    Make an appointment if you:       Are sick (cold, cough, flu, sore throat, earache or in pain).       Have a small injury (sprain, small cut, burn or broken bone).       Need a physical exam, Pap smear, vaccine or prescription refill.       Have questions about your health or medicines.    To reach us:      Call [phone] ([phone]). Open 24 hours every day. (For counseling services, call [phone].)    Log into [email or web address] at [email or web address]. (Visit [email or web address] to create an account.) Hospital emergency room    An emergency is a serious or life- threatening problem that must be treated right away.    Call 884 or get to the hospital if you have:      Very bad or sudden:            - Chest pain or pressure         - Bleeding         - Head or belly pain         - Dizziness or trouble seeing, walking or                          Speaking      Problems breathing      Blood in your vomit or you are coughing up blood      A major injury (knocked out, loss of a finger or limb, rape, broken bone protruding from skin)    A mental health crisis. (Or call the Mental Health Crisis line at 1-143.386.4698 or Suicide Prevention Hotline at 1-386.401.8422.)    Open 24 hours every day. You don't need an appointment.     Urgent care    Visit urgent care for sickness or small injuries when the clinic is closed. You don't need an appointment. To check hours or find an urgent care near  you, visit [email or web address]. Online care    Get online care from [facility] for more than 70 common problems, like colds, allergies and infections. Open 24 hours every day at:   [email or web address]   Need help deciding?    For advice about where to be seen, you may call your clinic and ask to speak with a nurse. We're here for you 24 hours every day.         If you are deaf or hard of hearing, please let us know. We provide many free services including sign language interpreters, oral interpreters, TTYs, telephone amplifiers, note takers and written materials.

## 2019-02-03 ENCOUNTER — NURSE TRIAGE (OUTPATIENT)
Dept: NURSING | Facility: CLINIC | Age: 69
End: 2019-02-03

## 2019-02-03 NOTE — TELEPHONE ENCOUNTER
Reason for call; From   [facility] [address] - pharmacist Tosin  called.   Had new Rx of Tramadol 50mg- 90 tablets no refill but last Rx says Rx  is to last 3 months and was sent 1/3/19 - clarification on Tramadol . .    Rx sent from [facility] - provider Nicole Magdaleno .   Recommendation / teaching ; @ 1242pm  [employer] web paged CHARLES Aranda to call back pharmacist Tosin for clarification on new Rx received of tramadol.   Advised Pharmacist to call back if no answer in 20 minutes or other questions.   Nasreen Reid RN  - [place] Nurse Advisor

## 2019-06-30 ENCOUNTER — OFFICE VISIT (OUTPATIENT)
Dept: URGENT CARE | Facility: URGENT CARE | Age: 69
End: 2019-06-30
Payer: COMMERCIAL

## 2019-06-30 VITALS
SYSTOLIC BLOOD PRESSURE: 136 MMHG | OXYGEN SATURATION: 99 % | BODY MASS INDEX: 22 KG/M2 | DIASTOLIC BLOOD PRESSURE: 70 MMHG | TEMPERATURE: 97.8 F | HEART RATE: 95 BPM | WEIGHT: 130.2 LBS

## 2019-06-30 DIAGNOSIS — J02.9 SORE THROAT: ICD-10-CM

## 2019-06-30 DIAGNOSIS — R59.1 LYMPHADENOPATHY: Primary | ICD-10-CM

## 2019-06-30 LAB
DEPRECATED S PYO AG THROAT QL EIA: NORMAL
SPECIMEN SOURCE: NORMAL

## 2019-06-30 PROCEDURE — 87081 CULTURE SCREEN ONLY: CPT | Performed by: FAMILY MEDICINE

## 2019-06-30 PROCEDURE — 87880 STREP A ASSAY W/OPTIC: CPT | Performed by: FAMILY MEDICINE

## 2019-06-30 PROCEDURE — 99213 OFFICE O/P EST LOW 20 MIN: CPT | Performed by: FAMILY MEDICINE

## 2019-06-30 NOTE — PROGRESS NOTES
Subjective     Devon Watts is a 68 year old male who presents to clinic today for the following health issues:    HPI   Chief Complaint   Patient presents with     Mass     Patient has lump on left side of throat        Duration: 12 days     Description (location/character/radiation): left neck lump, has gone down in size     Intensity:  Mild to moderate    Accompanying signs and symptoms: was seen by provider in [place], was prescribed some antibiotic     History (similar episodes/previous evaluation): None    Precipitating or alleviating factors: None    Therapies tried and outcome: antibiotic     No cough, weight loss, night sweats         Patient Active Problem List   Diagnosis     Type 2 diabetes mellitus without complication (H)     Hyperlipidemia with target LDL less than 100     Benign essential hypertension     Impingement syndrome of both shoulders     No past surgical history on file.    Social History     Tobacco Use     Smoking status: Never Smoker     Smokeless tobacco: Never Used   Substance Use Topics     Alcohol use: No     Family History   Problem Relation Age of Onset     Hypertension Son      Diabetes Son      Breast Cancer No family hx of      Colon Cancer No family hx of      Prostate Cancer No family hx of      Other Cancer No family hx of          Current Outpatient Medications   Medication Sig Dispense Refill     aspirin 325 MG tablet Take 325 mg by mouth daily       aspirin 81 MG tablet Take 81 mg by mouth daily       cefuroxime (CEFTIN) 500 MG tablet Take 1 tablet (500 mg) by mouth 2 times daily 20 tablet 0     GAVILYTE-G 236 G suspension TK UTD PER HANDOUT  0     lisinopril (PRINIVIL/ZESTRIL) 20 MG tablet Take 1 tablet (20 mg) by mouth daily 90 tablet 1     metFORMIN (GLUCOPHAGE) 1000 MG tablet Take 1 tablet (1,000 mg) by mouth 2 times daily (with meals) 180 tablet 1     traMADol (ULTRAM) 50 MG tablet TAKE 1 TABLET BY MOUTH EVERY 6 HOURS AS NEEDED FOR PAIN 90 tablet 0     atorvastatin  (LIPITOR) 10 MG tablet Take 1 tablet (10 mg) by mouth daily (Patient not taking: Reported on 4/6/2018) 90 tablet 1     ibuprofen (ADVIL/MOTRIN) 800 MG tablet Take 1 tablet (800 mg) by mouth every 8 hours as needed for moderate pain (Patient not taking: Reported on 1/3/2019) 30 tablet 0     No Known Allergies  Recent Labs   Lab Test 01/03/19  1419 06/04/18  1447 11/20/17  1249 06/22/17  1345 04/18/17  1501 09/09/16  1535   A1C 6.2* 5.9* 6.4*  --  6.4* 6.4*   LDL  --  86  --  77  --  86   HDL  --  52  --   --   --   --    TRIG  --  52  --   --   --   --    CR  --  1.24  --  1.10 1.27* 1.14   GFRESTIMATED  --  58*  --  67 57* 64   GFRESTBLACK  --  70  --  81 68 78   POTASSIUM  --  4.3  --  4.2 4.2 4.7   TSH  --   --  3.22  --  3.71  --       BP Readings from Last 3 Encounters:   06/30/19 136/70   01/03/19 132/64   06/04/18 121/68    Wt Readings from Last 3 Encounters:   06/30/19 59.1 kg (130 lb 3.2 oz)   01/03/19 61.7 kg (136 lb)   06/04/18 59 kg (130 lb)                  Reviewed and updated as needed this visit by Provider         Review of Systems   ROS COMP: Constitutional, HEENT, cardiovascular, pulmonary, gi and gu systems are negative, except as otherwise noted.      Objective    /70 (BP Location: Left arm, Patient Position: Chair, Cuff Size: Adult Regular)   Pulse 95   Temp 97.8  F (36.6  C) (Oral)   Wt 59.1 kg (130 lb 3.2 oz)   SpO2 99%   BMI 22.00 kg/m    Body mass index is 22 kg/m .  Physical Exam   GENERAL: alert and no distress  EYES: Eyes grossly normal to inspection, PERRL and conjunctivae and sclerae normal  HENT: normal cephalic/atraumatic, ear canals and TM's normal, nose and mouth without ulcers or lesions, oropharynx clear, oral mucous membranes moist and palpable submandibular lymph node, slightly more prominent left-sided, without any skin discoloration or tenderness  RESP: lungs clear to auscultation - no rales, rhonchi or wheezes  CV: regular rates and rhythm, normal S1 S2, no S3 or  S4 and no murmur, click or rub  ABDOMEN: soft, nontender  MS: no gross musculoskeletal defects noted, no edema    Diagnostic Test Results:  Results for orders placed or performed in visit on 06/30/19 (from the past 24 hour(s))   Strep, Rapid Screen   Result Value Ref Range    Specimen Description Throat     Rapid Strep A Screen       NEGATIVE: No Group A streptococcal antigen detected by immunoassay, await culture report.           Assessment & Plan     (R59.1) Lymphadenopathy  (primary encounter diagnosis)  Comment: Differentials discussed in detail.  Patient was treated with antibiotic recently in [place].  Physical examination as described above.  Suggested to continue ibuprofen, Tylenol and warm compresses.  ENT referral placed for further review and recommendations.  Patient understood and in agreement with above plan.  All questions answered.  Plan: OTOLARYNGOLOGY REFERRAL         (J02.9) Sore throat  Comment:   Plan: Strep, Rapid Screen              Patient Instructions     Patient Education     Local Lymph Node Swelling in the Neck, No Antibiotic Treatment    You have a swollen lymph node in your neck that is not infected. The lymph nodes are part of the immune system. They are found under the jaw and along the side of the neck, in the armpits, and in the groin. A nearby infection or inflammation causes the lymph nodes in that area to swell. They may also be mildly tender. This is normal.  Antibiotics are not used for a swollen lymph node that is not infected. You can use hot compresses and pain medicine to treat this condition. The pain will get better over the next 7 to 10 days. The swelling may take several months to go away.  Rarely, a bacterial infection occurs inside the lymph node, itself. When this happens, the lymph node becomes very painful and the nearby skin gets red and warm. You may also have a fever. If this happens, call your healthcare provider. You may need to take antibiotics. You may also  need to have the lymph node drained.  Home care  Follow these guidelines when caring for yourself at home:    Make a hot compress by running warm water over a wash cloth. Put the compress on the sore area until the compress cools off. Repeat this for 20 minutes. Use the compress 3 times a day for the first 3 days, or until the pain and redness begin to get better. The heat will make more blood flow to the area and speed the healing process.    You may use acetaminophen or ibuprofen to control pain and fever, unless another medicine was prescribed for this. Don t use ibuprofen in children under 6 months of age. If you have chronic liver or kidney disease, talk with your healthcare provider before using these medicines. Also talk with your provider if you ve had a stomach ulcer or GI bleeding. Don t give aspirin to anyone under 18 years of age who is ill with a fever. It may cause severe liver damage.  Follow-up care  Follow up with your healthcare provider, or as advised.  When to seek medical advice  Call your healthcare provider right away if any of these occur:    Redness over the lymph node    Swelling or pain in the lymph node gets worse    Lymph node is getting soft in the middle    Pus or fluid drains from the lymph node    You have trouble breathing or swallowing    Fever of 100.4 F (38 C) or higher, or as directed by your healthcare provider    You have questions or concerns   Date Last Reviewed: 2/1/2017 2000-2018 [employer]. [address] [place] [address]. All rights reserved. This information is not intended as a substitute for professional medical care. Always follow your healthcare professional's instructions.               Dheeraj Henriquez MD  [facility]

## 2019-06-30 NOTE — LETTER
July 1, 2019      Devon Jonesjosse  [address] [address]      Dear StevenMac,    The results of your recent lab tests were within normal limits (negative throat culture). Enclosed is a copy of these results.  If you have any further questions or problems, please contact our office at [phone].    Sincerely,        Dheeraj Henriquez MD    Resulted Orders   Strep, Rapid Screen   Result Value Ref Range    Specimen Description Throat     Rapid Strep A Screen       NEGATIVE: No Group A streptococcal antigen detected by immunoassay, await culture report.   Beta strep group A culture   Result Value Ref Range    Specimen Description Throat     Culture Micro No beta hemolytic Streptococcus Group A isolated

## 2019-06-30 NOTE — PATIENT INSTRUCTIONS
Patient Education     Local Lymph Node Swelling in the Neck, No Antibiotic Treatment    You have a swollen lymph node in your neck that is not infected. The lymph nodes are part of the immune system. They are found under the jaw and along the side of the neck, in the armpits, and in the groin. A nearby infection or inflammation causes the lymph nodes in that area to swell. They may also be mildly tender. This is normal.  Antibiotics are not used for a swollen lymph node that is not infected. You can use hot compresses and pain medicine to treat this condition. The pain will get better over the next 7 to 10 days. The swelling may take several months to go away.  Rarely, a bacterial infection occurs inside the lymph node, itself. When this happens, the lymph node becomes very painful and the nearby skin gets red and warm. You may also have a fever. If this happens, call your healthcare provider. You may need to take antibiotics. You may also need to have the lymph node drained.  Home care  Follow these guidelines when caring for yourself at home:    Make a hot compress by running warm water over a wash cloth. Put the compress on the sore area until the compress cools off. Repeat this for 20 minutes. Use the compress 3 times a day for the first 3 days, or until the pain and redness begin to get better. The heat will make more blood flow to the area and speed the healing process.    You may use acetaminophen or ibuprofen to control pain and fever, unless another medicine was prescribed for this. Don t use ibuprofen in children under 6 months of age. If you have chronic liver or kidney disease, talk with your healthcare provider before using these medicines. Also talk with your provider if you ve had a stomach ulcer or GI bleeding. Don t give aspirin to anyone under 18 years of age who is ill with a fever. It may cause severe liver damage.  Follow-up care  Follow up with your healthcare provider, or as advised.  When  to seek medical advice  Call your healthcare provider right away if any of these occur:    Redness over the lymph node    Swelling or pain in the lymph node gets worse    Lymph node is getting soft in the middle    Pus or fluid drains from the lymph node    You have trouble breathing or swallowing    Fever of 100.4 F (38 C) or higher, or as directed by your healthcare provider    You have questions or concerns   Date Last Reviewed: 2/1/2017 2000-2018 [employer]. [address] [place] [address]. All rights reserved. This information is not intended as a substitute for professional medical care. Always follow your healthcare professional's instructions.

## 2019-07-01 LAB
BACTERIA SPEC CULT: NORMAL
SPECIMEN SOURCE: NORMAL

## 2019-08-01 ENCOUNTER — TELEPHONE (OUTPATIENT)
Dept: FAMILY MEDICINE | Facility: CLINIC | Age: 69
End: 2019-08-01

## 2019-08-01 DIAGNOSIS — M75.42 IMPINGEMENT SYNDROME OF BOTH SHOULDERS: ICD-10-CM

## 2019-08-01 DIAGNOSIS — M75.41 IMPINGEMENT SYNDROME OF BOTH SHOULDERS: ICD-10-CM

## 2019-08-01 NOTE — TELEPHONE ENCOUNTER
traMADol (ULTRAM) 50 MG tablet      Last Written Prescription Date:  5/6/2019  Last Fill Quantity: 90 tablet,   # refills: 0  Last Office Visit: 1/3/2019  SHANITA Erazo    Future Office visit:       Routing refill request to provider for review/approval because:  Drug not on the [facility], [facility] or [facility] refill protocol or controlled substance

## 2019-08-06 NOTE — TELEPHONE ENCOUNTER
TC, please assist patient in making an appointment with provider and then route to provider to consider agustin refill x 1.    Satya Chris RN

## 2019-08-08 NOTE — TELEPHONE ENCOUNTER
LMOM for patient to call back main clinic number to schedule an appointment for a physical.    Thank you,  Bernadette CHAUHAN    NE Team [place]

## 2019-08-12 NOTE — TELEPHONE ENCOUNTER
Patient/family was instructed to return call to [facility] RN directly on the RN Call back line at [phone].     Destiney Mcneal RN

## 2019-08-12 NOTE — TELEPHONE ENCOUNTER
Did a thorough review of this man's chart.    1. He's overdue for a follow up visit for all of his chronic needs, including diabetes and pain. Most importantly, because of newer state based standards for how we manage controlled substances, he would need a face to face visit with someone to discuss his refill. We cannot refill without a face to face due to those rules. This would be a reschedule from the 7/26 appointment that he no show.  2. There is no pain contract or pain plan in his problem list directing how refills should be managed for him  3. It sounds like he was escalated and quite angry today. We had reached out to him it looks like 2 times to try to discuss the refill and he also no showed on 7/26 visit here. It appears we've given ample opportunities to try to find a way to get him here for an appointment for a refill. If staff feels he is being verbally aggressive or harassing again, I would recommend that we end the call.     Thanks.  Demetrius Camacho, VINI, PA-C

## 2019-08-12 NOTE — TELEPHONE ENCOUNTER
Called patient to schedule an appointment and he was very upset that we have not refilled his Tramadol.  Patient started yelling at me and said that Dr Magdaleno always fills his Tramadol and he said it was not right that we are not refilling his medication.  Patient just kept yelling at me and I tried to explain that he was due for an appointment and that we would give him a agustin refill but he would not listen to me and just kept yelling every time I started to speak.  Patient wants to talk with the nurse.    Payal Monae

## 2019-08-13 RX ORDER — TRAMADOL HYDROCHLORIDE 50 MG/1
TABLET ORAL
Qty: 90 TABLET | Refills: 0 | OUTPATIENT
Start: 2019-08-13

## 2019-08-13 NOTE — TELEPHONE ENCOUNTER
Reached out to patient again to relay below message from provider. Patient was frustrated and was vocal with frustrations between the clinic and pharmacy. Listened and offered understanding and calm responses. Patient states that no one in his family goes to the [facility] and question why he still goes there as well, patient is no also questioning why. Offered to schedule patient with another provider to assist in processing refill. Patient was scheduled at [place] with another provider tomorrow.     Destiney Mcneal RN

## 2019-08-14 ENCOUNTER — OFFICE VISIT (OUTPATIENT)
Dept: FAMILY MEDICINE | Facility: CLINIC | Age: 69
End: 2019-08-14
Payer: COMMERCIAL

## 2019-08-14 VITALS
BODY MASS INDEX: 22.14 KG/M2 | SYSTOLIC BLOOD PRESSURE: 132 MMHG | HEART RATE: 97 BPM | WEIGHT: 131 LBS | OXYGEN SATURATION: 100 % | DIASTOLIC BLOOD PRESSURE: 68 MMHG

## 2019-08-14 DIAGNOSIS — M75.42 IMPINGEMENT SYNDROME OF BOTH SHOULDERS: ICD-10-CM

## 2019-08-14 DIAGNOSIS — M75.41 IMPINGEMENT SYNDROME OF BOTH SHOULDERS: ICD-10-CM

## 2019-08-14 DIAGNOSIS — I10 BENIGN ESSENTIAL HYPERTENSION: ICD-10-CM

## 2019-08-14 DIAGNOSIS — E11.9 TYPE 2 DIABETES MELLITUS WITHOUT COMPLICATION, WITHOUT LONG-TERM CURRENT USE OF INSULIN (H): Primary | ICD-10-CM

## 2019-08-14 DIAGNOSIS — E78.5 HYPERLIPIDEMIA WITH TARGET LDL LESS THAN 100: ICD-10-CM

## 2019-08-14 PROCEDURE — 99214 OFFICE O/P EST MOD 30 MIN: CPT | Performed by: INTERNAL MEDICINE

## 2019-08-14 RX ORDER — TRAMADOL HYDROCHLORIDE 50 MG/1
TABLET ORAL
Qty: 90 TABLET | Refills: 0 | Status: SHIPPED | OUTPATIENT
Start: 2019-08-14 | End: 2019-08-14

## 2019-08-14 RX ORDER — TRAMADOL HYDROCHLORIDE 50 MG/1
TABLET ORAL
Qty: 90 TABLET | Refills: 0 | Status: SHIPPED | OUTPATIENT
Start: 2019-08-14 | End: 2019-11-06

## 2019-08-14 NOTE — PROGRESS NOTES
Subjective     Devon Watts is a 68 year old male who presents to clinic today for the following health issues:    HPI   Diabetes Follow-up      How often are you checking your blood sugar? One time daily    What time of day are you checking your blood sugars (select all that apply)?  Before and after meals    Have you had any blood sugars above 200?  No    Have you had any blood sugars below 70?  No    What symptoms do you notice when your blood sugar is low?  None    What concerns do you have today about your diabetes? None     Do you have any of these symptoms? (Select all that apply)  No numbness or tingling in feet.  No redness, sores or blisters on feet.  No complaints of excessive thirst.  No reports of blurry vision.  No significant changes to weight.     Have you had a diabetic eye exam in the last 12 months? No    Diabetes Management Resources  May [facility] went to [facility]  Swollen glans in the area  Prescribed  Antibiotics   Went to the   Hyperlipidemia Follow-Up      Are you having any of the following symptoms? (Select all that apply)  No complaints of shortness of breath, chest pain or pressure.  No increased sweating or nausea with activity.  No left-sided neck or arm pain.  No complaints of pain in calves when walking 1-2 blocks.    Are you regularly taking any medication or supplement to lower your cholesterol?   No    Are you having muscle aches or other side effects that you think could be caused by your cholesterol lowering medication?  No    Hypertension Follow-up      Do you check your blood pressure regularly outside of the clinic? Yes     Are you following a low salt diet? Yes    Are your blood pressures ever more than 140 on the top number (systolic) OR more   than 90 on the bottom number (diastolic), for example 140/90? No    BP Readings from Last 2 Encounters:   08/14/19 132/68   06/30/19 136/70     Hemoglobin A1C (%)   Date Value   01/03/2019 6.2 (H)   06/04/2018 5.9 (H)     LDL Cholesterol  Calculated (mg/dL)   Date Value   06/04/2018 86     LDL Cholesterol Direct (mg/dL)   Date Value   06/22/2017 77   09/09/2016 86         How many servings of fruits and vegetables do you eat daily?  2-3    On average, how many sweetened beverages do you drink each day (soda, juice, sweet tea, etc)?   Occasionally     How many days per week do you miss taking your medication? 0        Patient Active Problem List   Diagnosis     Type 2 diabetes mellitus without complication (H)     Hyperlipidemia with target LDL less than 100     Benign essential hypertension     Impingement syndrome of both shoulders     History reviewed. No pertinent surgical history.    Social History     Tobacco Use     Smoking status: Never Smoker     Smokeless tobacco: Never Used   Substance Use Topics     Alcohol use: No     Family History   Problem Relation Age of Onset     Hypertension Son      Diabetes Son      Breast Cancer No family hx of      Colon Cancer No family hx of      Prostate Cancer No family hx of      Other Cancer No family hx of          Current Outpatient Medications   Medication Sig Dispense Refill     GAVILYTE-G 236 G suspension TK UTD PER HANDOUT  0     lisinopril (PRINIVIL/ZESTRIL) 20 MG tablet Take 1 tablet (20 mg) by mouth daily 90 tablet 1     metFORMIN (GLUCOPHAGE) 1000 MG tablet Take 1 tablet (1,000 mg) by mouth 2 times daily (with meals) 180 tablet 1     traMADol (ULTRAM) 50 MG tablet TAKE 1 TABLET BY MOUTH EVERY 6 HOURS AS NEEDED FOR PAIN 90 tablet 0     aspirin 325 MG tablet Take 325 mg by mouth daily       aspirin 81 MG tablet Take 81 mg by mouth daily       No Known Allergies  Recent Labs   Lab Test 01/03/19  1419 06/04/18  1447 11/20/17  1249 06/22/17  1345 04/18/17  1501 09/09/16  1535   A1C 6.2* 5.9* 6.4*  --  6.4* 6.4*   LDL  --  86  --  77  --  86   HDL  --  52  --   --   --   --    TRIG  --  52  --   --   --   --    CR  --  1.24  --  1.10 1.27* 1.14   GFRESTIMATED  --  58*  --  67 57* 64   GFRESTBLACK  --   70  --  81 68 78   POTASSIUM  --  4.3  --  4.2 4.2 4.7   TSH  --   --  3.22  --  3.71  --           Reviewed and updated as needed this visit by Provider         Review of Systems   ROS COMP: Constitutional, HEENT, cardiovascular, pulmonary, gi and gu systems are negative, except as otherwise noted.      Objective    /68   Pulse 97   Wt 59.4 kg (131 lb)   SpO2 100%   BMI 22.14 kg/m    Body mass index is 22.14 kg/m .  Physical Exam   GENERAL: healthy, alert and no distress  EYES: Eyes grossly normal to inspection, PERRL and conjunctivae and sclerae normal  HENT: ear canals and TM's normal, nose and mouth without ulcers or lesions  NECK: no adenopathy, no asymmetry, masses, or scars and thyroid normal to palpation  RESP: lungs clear to auscultation - no rales, rhonchi or wheezes  CV: regular rate and rhythm, normal S1 S2, no S3 or S4, no murmur, click or rub, no peripheral edema and peripheral pulses strong  ABDOMEN: soft, nontender, no hepatosplenomegaly, no masses and bowel sounds normal  MS: no gross musculoskeletal defects noted, no edema  SKIN: no suspicious lesions or rashes  NEURO: Normal strength and tone, mentation intact and speech normal  BACK: no CVA tenderness, no paralumbar tenderness  PSYCH: mentation appears normal, affect normal/bright  LYMPH: no cervical, supraclavicular, axillary, or inguinal adenopathy    Diagnostic Test Results:  Labs reviewed in Epic  Results for orders placed or performed in visit on 06/30/19   Strep, Rapid Screen   Result Value Ref Range    Specimen Description Throat     Rapid Strep A Screen       NEGATIVE: No Group A streptococcal antigen detected by immunoassay, await culture report.   Beta strep group A culture   Result Value Ref Range    Specimen Description Throat     Culture Micro No beta hemolytic Streptococcus Group A isolated            Assessment & Plan       ICD-10-CM    1. Type 2 diabetes mellitus without complication, without long-term current use of  insulin (H) E11.9 OPTOMETRY REFERRAL     Hemoglobin A1c     Albumin Random Urine Quantitative with Creat Ratio   2. Hyperlipidemia with target LDL less than 100 E78.5 Lipid panel reflex to direct LDL Fasting   3. Benign essential hypertension I10 Basic metabolic panel   4. Impingement syndrome of both shoulders M75.41 traMADol (ULTRAM) 50 MG tablet    M75.42 DISCONTINUED: traMADol (ULTRAM) 50 MG tablet      BP     132/68  8/15/2019    Lab Results   Component Value Date     06/04/2018     Lab Results   Component Value Date    A1C 6.2 01/03/2019     Lab Results   Component Value Date    LDL 86 06/04/2018     Lab Results   Component Value Date    MICROL 12 06/04/2018     No results found for: MICROALBUMIN      Work on weight loss  Regular exercise    No follow-ups on file.    Roberto Fortune MD  [facility]

## 2019-11-05 DIAGNOSIS — M75.42 IMPINGEMENT SYNDROME OF BOTH SHOULDERS: ICD-10-CM

## 2019-11-05 DIAGNOSIS — M75.41 IMPINGEMENT SYNDROME OF BOTH SHOULDERS: ICD-10-CM

## 2019-11-06 RX ORDER — TRAMADOL HYDROCHLORIDE 50 MG/1
TABLET ORAL
Qty: 90 TABLET | Refills: 0 | Status: SHIPPED | OUTPATIENT
Start: 2019-11-06 | End: 2020-01-24

## 2019-11-19 ENCOUNTER — OFFICE VISIT (OUTPATIENT)
Dept: FAMILY MEDICINE | Facility: CLINIC | Age: 69
End: 2019-11-19
Payer: COMMERCIAL

## 2019-11-19 VITALS
WEIGHT: 129.6 LBS | BODY MASS INDEX: 21.9 KG/M2 | SYSTOLIC BLOOD PRESSURE: 135 MMHG | HEART RATE: 105 BPM | TEMPERATURE: 98.1 F | DIASTOLIC BLOOD PRESSURE: 68 MMHG

## 2019-11-19 DIAGNOSIS — N18.30 CKD (CHRONIC KIDNEY DISEASE) STAGE 3, GFR 30-59 ML/MIN (H): ICD-10-CM

## 2019-11-19 DIAGNOSIS — E11.9 TYPE 2 DIABETES MELLITUS WITHOUT COMPLICATION, WITHOUT LONG-TERM CURRENT USE OF INSULIN (H): ICD-10-CM

## 2019-11-19 DIAGNOSIS — R60.0 SUBMANDIBULAR GLAND SWELLING: Primary | ICD-10-CM

## 2019-11-19 DIAGNOSIS — I10 BENIGN ESSENTIAL HYPERTENSION: ICD-10-CM

## 2019-11-19 DIAGNOSIS — Z23 NEED FOR PROPHYLACTIC VACCINATION AND INOCULATION AGAINST INFLUENZA: ICD-10-CM

## 2019-11-19 DIAGNOSIS — E78.5 HYPERLIPIDEMIA WITH TARGET LDL LESS THAN 100: ICD-10-CM

## 2019-11-19 LAB — HBA1C MFR BLD: 7 % (ref 0–5.6)

## 2019-11-19 PROCEDURE — 82043 UR ALBUMIN QUANTITATIVE: CPT | Performed by: INTERNAL MEDICINE

## 2019-11-19 PROCEDURE — 99214 OFFICE O/P EST MOD 30 MIN: CPT | Mod: 25 | Performed by: FAMILY MEDICINE

## 2019-11-19 PROCEDURE — 36415 COLL VENOUS BLD VENIPUNCTURE: CPT | Performed by: INTERNAL MEDICINE

## 2019-11-19 PROCEDURE — 84443 ASSAY THYROID STIM HORMONE: CPT | Performed by: FAMILY MEDICINE

## 2019-11-19 PROCEDURE — 80061 LIPID PANEL: CPT | Performed by: FAMILY MEDICINE

## 2019-11-19 PROCEDURE — 80048 BASIC METABOLIC PNL TOTAL CA: CPT | Performed by: FAMILY MEDICINE

## 2019-11-19 PROCEDURE — 83036 HEMOGLOBIN GLYCOSYLATED A1C: CPT | Performed by: INTERNAL MEDICINE

## 2019-11-19 RX ORDER — LISINOPRIL 20 MG/1
20 TABLET ORAL DAILY
Qty: 90 TABLET | Refills: 1 | Status: SHIPPED | OUTPATIENT
Start: 2019-11-19 | End: 2020-01-24

## 2019-11-19 NOTE — PROGRESS NOTES
Subjective     Devon Watts is a 68 year old male who presents to clinic today for the following health issues:    HPI   Diabetes Follow-up    How often are you checking your blood sugar? Four or more times daily  Blood sugar testing frequency justification:  NA  What time of day are you checking your blood sugars (select all that apply)?  Before and after meals  Have you had any blood sugars above 200?  No  Have you had any blood sugars below 70?  No    What symptoms do you notice when your blood sugar is low?  None    What concerns do you have today about your diabetes? None     Do you have any of these symptoms? (Select all that apply)  No numbness or tingling in feet.  No redness, sores or blisters on feet.  No complaints of excessive thirst.  No reports of blurry vision.  No significant changes to weight.     Have you had a diabetic eye exam in the last 12 months? No  Managed by Metformin    Shoulder Pain  Managed by Tramadol     Possible Enlarged Lymph  Patient presents with lump under his jaw, which he noticed in June. Another provider believes it is an enlarged lymph node and tested negative for strep. Patient notes that the lump is half the size. Patient denies any troubles with his dentition.    Hypertension Follow-up      Do you check your blood pressure regularly outside of the clinic? No     Are you following a low salt diet? No    Are your blood pressures ever more than 140 on the top number (systolic) OR more   than 90 on the bottom number (diastolic), for example 140/90? No  Managed by lisinopril.    BP Readings from Last 2 Encounters:   11/19/19 135/68   08/14/19 132/68     Hemoglobin A1C (%)   Date Value   11/19/2019 7.0 (H)   01/03/2019 6.2 (H)     LDL Cholesterol Calculated (mg/dL)   Date Value   06/04/2018 86     LDL Cholesterol Direct (mg/dL)   Date Value   06/22/2017 77   09/09/2016 86       Diabetes Management Resources      How many servings of fruits and vegetables do you eat daily?   2-3    On average, how many sweetened beverages do you drink each day (soda, juice, sweet tea, etc)?   1    How many days per week do you miss taking your medication? 0    BP Readings from Last 3 Encounters:   11/19/19 135/68   08/14/19 132/68   06/30/19 136/70    Wt Readings from Last 3 Encounters:   11/19/19 58.8 kg (129 lb 9.6 oz)   08/14/19 59.4 kg (131 lb)   06/30/19 59.1 kg (130 lb 3.2 oz)        Reviewed and updated as needed this visit by Provider  Allergies  Meds  Problems  Med Hx  Surg Hx         Review of Systems   ROS COMP: Constitutional, HEENT, cardiovascular, pulmonary, gi and gu systems are negative, except as otherwise noted.    This document serves as a record of the services and decisions personally performed and made by Nicole Magdaleno DO. It was created on her behalf by Azra Johnson, a trained medical scribe. The creation of this document is based on the provider's statements to the medical scribe.  Azra Jhonson 4:22 PM November 19, 2019      Objective    /68 (BP Location: Left arm, Patient Position: Chair, Cuff Size: Adult Regular)   Pulse 105   Temp 98.1  F (36.7  C) (Oral)   Wt 58.8 kg (129 lb 9.6 oz)   BMI 21.90 kg/m    Body mass index is 21.9 kg/m .     Physical Exam   GENERAL: healthy, alert and no distress  HENT: ear canals and TM's normal, mouth without ulcers or lesions  NECK: no adenopathy, no asymmetry, masses, or scars and thyroid normal to palpation.  Left submandibular gland is firm and is about 1cm by 1cm.   RESP: lungs clear to auscultation - no rales, rhonchi or wheezes  CV: regular rate and rhythm, normal S1 S2, no S3 or S4, no murmur, click or rub, no peripheral edema and peripheral pulses strong  MS: no gross musculoskeletal defects noted, no edema  SKIN: no suspicious lesions or rashes to visible skin   NEURO: Normal strength and tone, mentation intact and speech normal  PSYCH: mentation appears normal, affect normal/bright        Assessment & Plan        ICD-10-CM    1. Submandibular gland swelling R60.9 OTOLARYNGOLOGY REFERRAL     OTOLARYNGOLOGY REFERRAL   2. Type 2 diabetes mellitus without complication, without long-term current use of insulin (H) E11.9 TSH WITH FREE T4 REFLEX     OPHTHALMOLOGY ADULT REFERRAL     Hemoglobin A1c     Albumin Random Urine Quantitative with Creat Ratio     metFORMIN (GLUCOPHAGE) 1000 MG tablet   3. Benign essential hypertension I10 BASIC METABOLIC PANEL     lisinopril (PRINIVIL/ZESTRIL) 20 MG tablet   4. CKD (chronic kidney disease) stage 3, GFR 30-59 ml/min (H) N18.3 BASIC METABOLIC PANEL   5. Hyperlipidemia with target LDL less than 100 E78.5 Lipid panel reflex to direct LDL Non-fasting   6. Need for prophylactic vaccination and inoculation against influenza Z23 ADMIN INFLUENZA (For MEDICARE Patients ONLY) []     CANCELED: INFLUENZA (HIGH DOSE) 3 VALENT VACCINE [83221]     Patient should see ENT specialist for his hard submandibular gland.     Metabolic panel has been ordered to recheck his CKD.     The current medical regimen for hyperlipidemia, diabetes, hypertension are effective;  continue present plan and medications.      No follow-ups on file.     The information in this document, created by the medical scribe, Azra Johnson, for me, accurately reflects the services I personally performed and the decisions made by me. I have reviewed and approved this document for accuracy.  November 19, 2019 4:06 PM    Nicole Magdaleno DO  [facility]

## 2019-11-19 NOTE — LETTER
November 21, 2019      Devon Watts  [address] [address]        Dear Devon,     The results of your recent lab tests were within normal limits. Enclosed is a copy of these results.  If you have any further questions or problems, please contact our office.    Sincerely,    Nicole Magdaleno, DO/pv    Results for orders placed or performed in visit on 11/19/19   BASIC METABOLIC PANEL     Status: Abnormal   Result Value Ref Range    Sodium 142 133 - 144 mmol/L    Potassium 4.4 3.4 - 5.3 mmol/L    Chloride 109 94 - 109 mmol/L    Carbon Dioxide 25 20 - 32 mmol/L    Anion Gap 8 3 - 14 mmol/L    Glucose 149 (H) 70 - 99 mg/dL    Urea Nitrogen 24 7 - 30 mg/dL    Creatinine 1.26 (H) 0.66 - 1.25 mg/dL    GFR Estimate 57 (L) >60 mL/min/[1.73_m2]    GFR Estimate If Black 67 >60 mL/min/[1.73_m2]    Calcium 9.4 8.5 - 10.1 mg/dL   Lipid panel reflex to direct LDL Non-fasting     Status: None   Result Value Ref Range    Cholesterol 156 <200 mg/dL    Triglycerides 76 <150 mg/dL    HDL Cholesterol 61 >39 mg/dL    LDL Cholesterol Calculated 80 <100 mg/dL    Non HDL Cholesterol 95 <130 mg/dL   TSH WITH FREE T4 REFLEX     Status: None   Result Value Ref Range    TSH 1.74 0.40 - 4.00 mU/L   Hemoglobin A1c     Status: Abnormal   Result Value Ref Range    Hemoglobin A1C 7.0 (H) 0 - 5.6 %   Albumin Random Urine Quantitative with Creat Ratio     Status: None   Result Value Ref Range    Creatinine Urine 108 mg/dL    Albumin Urine mg/L 5 mg/L    Albumin Urine mg/g Cr 4.84 0 - 17 mg/g Cr

## 2019-11-20 LAB
ANION GAP SERPL CALCULATED.3IONS-SCNC: 8 MMOL/L (ref 3–14)
BUN SERPL-MCNC: 24 MG/DL (ref 7–30)
CALCIUM SERPL-MCNC: 9.4 MG/DL (ref 8.5–10.1)
CHLORIDE SERPL-SCNC: 109 MMOL/L (ref 94–109)
CHOLEST SERPL-MCNC: 156 MG/DL
CO2 SERPL-SCNC: 25 MMOL/L (ref 20–32)
CREAT SERPL-MCNC: 1.26 MG/DL (ref 0.66–1.25)
CREAT UR-MCNC: 108 MG/DL
GFR SERPL CREATININE-BSD FRML MDRD: 57 ML/MIN/{1.73_M2}
GLUCOSE SERPL-MCNC: 149 MG/DL (ref 70–99)
HDLC SERPL-MCNC: 61 MG/DL
LDLC SERPL CALC-MCNC: 80 MG/DL
MICROALBUMIN UR-MCNC: 5 MG/L
MICROALBUMIN/CREAT UR: 4.84 MG/G CR (ref 0–17)
NONHDLC SERPL-MCNC: 95 MG/DL
POTASSIUM SERPL-SCNC: 4.4 MMOL/L (ref 3.4–5.3)
SODIUM SERPL-SCNC: 142 MMOL/L (ref 133–144)
TRIGL SERPL-MCNC: 76 MG/DL
TSH SERPL DL<=0.005 MIU/L-ACNC: 1.74 MU/L (ref 0.4–4)

## 2019-12-02 NOTE — PROGRESS NOTES
----- Message from Allison Crain MA sent at 11/29/2019  3:30 PM CST -----  I didn't see anything scanned in the media for this patient  ----- Message -----  From: Joyce Rajput  Sent: 11/29/2019   3:14 PM CST  To: David Darden Staff    Letter dated [id number] from Guero Victoria @ [facility] requesting Dr Hernandez to address  Scanned in Media tab of patient's chart/Please fax Dr Hernandez response to the work comp  Ofc @ [phone] to complete request  Thanks       Quick Note:    Please send a normal letter.     Nicole Magdaleno D.O.    ______

## 2020-01-22 DIAGNOSIS — I10 BENIGN ESSENTIAL HYPERTENSION: ICD-10-CM

## 2020-01-22 NOTE — TELEPHONE ENCOUNTER
"Requested Prescriptions   Pending Prescriptions Disp Refills     lisinopril (PRINIVIL/ZESTRIL) 20 MG tablet [Pharmacy Med Name: LISINOPRIL 20MG TABLETS]  Last Written Prescription Date:  11/19/2019  Last Fill Quantity: 90 tablet,  # refills: 1   Last office visit: 11/19/2019 with prescribing provider:  SHANITA Magdaleno   Future Office Visit:   90 tablet 1     Sig: TAKE 1 TABLET(20 MG) BY MOUTH DAILY       ACE Inhibitors (Including Combos) Protocol Failed - 1/22/2020  3:09 AM        Failed - Normal serum creatinine on file in past 12 months     Recent Labs   Lab Test 11/19/19  1516   CR 1.26*             Passed - Blood pressure under 140/90 in past 12 months     BP Readings from Last 3 Encounters:   11/19/19 135/68   08/14/19 132/68   06/30/19 136/70                 Passed - Recent (12 mo) or future (30 days) visit within the authorizing provider's specialty     Patient has had an office visit with the authorizing provider or a provider within the authorizing providers department within the previous 12 mos or has a future within next 30 days. See \"Patient Info\" tab in inbasket, or \"Choose Columns\" in Meds & Orders section of the refill encounter.              Passed - Medication is active on med list        Passed - Patient is age 18 or older        Passed - Normal serum potassium on file in past 12 months     Recent Labs   Lab Test 11/19/19  1516   POTASSIUM 4.4             "

## 2020-01-23 DIAGNOSIS — M75.41 IMPINGEMENT SYNDROME OF BOTH SHOULDERS: ICD-10-CM

## 2020-01-23 DIAGNOSIS — M75.42 IMPINGEMENT SYNDROME OF BOTH SHOULDERS: ICD-10-CM

## 2020-01-24 RX ORDER — LISINOPRIL 20 MG/1
TABLET ORAL
Qty: 90 TABLET | Refills: 0 | Status: SHIPPED | OUTPATIENT
Start: 2020-01-24 | End: 2020-08-04

## 2020-01-24 RX ORDER — TRAMADOL HYDROCHLORIDE 50 MG/1
TABLET ORAL
Qty: 90 TABLET | Refills: 0 | Status: SHIPPED | OUTPATIENT
Start: 2020-01-24 | End: 2020-04-02

## 2020-01-24 NOTE — TELEPHONE ENCOUNTER
Routing refill request to provider for review/approval because:         Failed - Normal serum creatinine on file in past 12 months           Recent Labs   Lab Test 11/19/19  1516   CR 1.26*            Lurdes Oconnor, RN, BSN, PHN  [facility]: [place]

## 2020-01-24 NOTE — TELEPHONE ENCOUNTER
Requested Prescriptions   Pending Prescriptions Disp Refills     traMADol (ULTRAM) 50 MG tablet [Pharmacy Med Name: TRAMADOL 50MG TABLETS] 90 tablet      Sig: TAKE 1 TABLET BY MOUTH EVERY 6 HOURS AS NEEDED FOR PAIN       There is no refill protocol information for this order         Last Written Prescription Date:  11-6-19  Last Fill Quantity: 90,   # refills: 0  Last Office Visit: 8-14-19  Future Office visit:       Routing refill request to provider for review/approval because:  Drug not on the [facility], P or [facility] refill protocol or controlled substance

## 2020-01-30 NOTE — TELEPHONE ENCOUNTER
Spoke with patient and advised of the need for a follow up in May. He states he will call to schedule closer to that time.    He states that he hasn't scheduled with ENT because they didn't have an availability the last time he called, but he states he does still have their phone number and he will call to schedule an appointment.    Arnaldo Carvalho

## 2020-03-17 ENCOUNTER — TELEPHONE (OUTPATIENT)
Dept: FAMILY MEDICINE | Facility: CLINIC | Age: 70
End: 2020-03-17

## 2020-03-17 NOTE — TELEPHONE ENCOUNTER
PT called again and son asking when would receive a call.  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

## 2020-03-17 NOTE — TELEPHONE ENCOUNTER
I called patient, he denies any travel in the past month, denies cough, fever, suspicion of fever, or shortness of breath.  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  He is worried about his age and underlying chronic health issues (type 2 diabetes, hypertension) about whether he should continue to work or not.    I advised he observe the usual precautions, handwashing, avoid touching face, avoid contact with anyone who is ill.    Routed to Dr. Magdaleno to address.    Brisa Lazaro RN  [facility]

## 2020-03-17 NOTE — TELEPHONE ENCOUNTER
I called patient back, advised him of provider's response.   He will talk to his supervisor and see what he would need (like a letter?).    Brisa Lazaro RN  [facility]

## 2020-03-17 NOTE — TELEPHONE ENCOUNTER
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  He is at high risk of complications from a coronavirus infection.    Nicole Magdaleno D.O.

## 2020-03-17 NOTE — TELEPHONE ENCOUNTER
Reason for Call:  Other     Detailed comments: Patient requesting for only PCP to call regarding [email or web address]. Please advise.     Phone Number Patient can be reached at: Home number on file [phone] (home)    Best Time: Anytime    Can we leave a detailed message on this number? YES    Call taken on 3/17/2020 at 8:09 AM by Gem Aden

## 2020-04-02 DIAGNOSIS — M75.41 IMPINGEMENT SYNDROME OF BOTH SHOULDERS: ICD-10-CM

## 2020-04-02 DIAGNOSIS — M75.42 IMPINGEMENT SYNDROME OF BOTH SHOULDERS: ICD-10-CM

## 2020-04-02 RX ORDER — TRAMADOL HYDROCHLORIDE 50 MG/1
TABLET ORAL
Qty: 90 TABLET | Refills: 0 | Status: SHIPPED | OUTPATIENT
Start: 2020-04-02 | End: 2020-07-02

## 2020-04-02 NOTE — TELEPHONE ENCOUNTER
Requested Prescriptions   Pending Prescriptions Disp Refills     traMADol (ULTRAM) 50 MG tablet [Pharmacy Med Name: TRAMADOL 50MG TABLETS]        Last Written Prescription Date:  1/24/2020  Last Fill Quantity: 90 tablet,   # refills: 0  Last Office Visit: 11/19/2019 SHANITA Erazo  Future Office visit:       Routing refill request to provider for review/approval because:  Drug not on the [facility], [facility] or  Health refill protocol or controlled substance 90 tablet      Sig: TAKE ONE TABLET BY MOUTH EVERY 6 HOURS AS NEEDED FOR PAIN       There is no refill protocol information for this order

## 2020-04-02 NOTE — TELEPHONE ENCOUNTER
Routing refill request to provider for review/approval because:  Drug not on the [facility] refill protocol   Skylar Antony RN on 4/2/2020 at 1:49 PM

## 2020-06-30 DIAGNOSIS — M75.41 IMPINGEMENT SYNDROME OF BOTH SHOULDERS: ICD-10-CM

## 2020-06-30 DIAGNOSIS — M75.42 IMPINGEMENT SYNDROME OF BOTH SHOULDERS: ICD-10-CM

## 2020-07-01 NOTE — TELEPHONE ENCOUNTER
Patient is calling to check the status of the refill request.  He has 1 pill left.  Please call into pharmacy today.    Noelle Shafer  Patient Representative

## 2020-07-01 NOTE — TELEPHONE ENCOUNTER
Routing refill request for Tramadol to Dr. Magdaleno.  Last filled on 4/2, 90 tabs.  RX pended if agreeable.      Patient states he has one pill left for today, and it will be fine for him to wait until tomorrow morning when Dr. Magdaleno is working from home.    Please route back to team to notify patient and to schedule follow up for DM and chronic pain.  Was suppose to follow up around 5/19.     Satya Chris RN

## 2020-07-02 RX ORDER — TRAMADOL HYDROCHLORIDE 50 MG/1
TABLET ORAL
Qty: 90 TABLET | Refills: 0 | Status: SHIPPED | OUTPATIENT
Start: 2020-07-02 | End: 2020-09-22

## 2020-07-02 NOTE — TELEPHONE ENCOUNTER
Called and scheduled patient for 8/4 with pcp at the [facility]. He would like to have blood typing labs done at this time, and asked that I add a note so he remembers to speak with the doctor about this. Note added to appt notes.    Arnaldo Carvalho

## 2020-07-02 NOTE — TELEPHONE ENCOUNTER
Refill has been placed.  I want him to come into the office for a visit so we can get his labs    Nicole Magdaleno DO

## 2020-08-04 ENCOUNTER — OFFICE VISIT (OUTPATIENT)
Dept: FAMILY MEDICINE | Facility: CLINIC | Age: 70
End: 2020-08-04
Payer: COMMERCIAL

## 2020-08-04 VITALS
OXYGEN SATURATION: 100 % | HEART RATE: 86 BPM | SYSTOLIC BLOOD PRESSURE: 134 MMHG | WEIGHT: 131 LBS | HEIGHT: 64 IN | TEMPERATURE: 98.3 F | DIASTOLIC BLOOD PRESSURE: 65 MMHG | BODY MASS INDEX: 22.36 KG/M2

## 2020-08-04 DIAGNOSIS — E11.9 TYPE 2 DIABETES MELLITUS WITHOUT COMPLICATION, WITHOUT LONG-TERM CURRENT USE OF INSULIN (H): Primary | ICD-10-CM

## 2020-08-04 DIAGNOSIS — I10 BENIGN ESSENTIAL HYPERTENSION: ICD-10-CM

## 2020-08-04 DIAGNOSIS — E78.5 HYPERLIPIDEMIA LDL GOAL <70: ICD-10-CM

## 2020-08-04 DIAGNOSIS — Z01.89 PATIENT REQUEST FOR DIAGNOSTIC TESTING: ICD-10-CM

## 2020-08-04 LAB — HBA1C MFR BLD: 7.2 % (ref 0–5.6)

## 2020-08-04 PROCEDURE — 83036 HEMOGLOBIN GLYCOSYLATED A1C: CPT | Performed by: FAMILY MEDICINE

## 2020-08-04 PROCEDURE — 86900 BLOOD TYPING SEROLOGIC ABO: CPT | Performed by: FAMILY MEDICINE

## 2020-08-04 PROCEDURE — 80048 BASIC METABOLIC PNL TOTAL CA: CPT | Performed by: FAMILY MEDICINE

## 2020-08-04 PROCEDURE — 86901 BLOOD TYPING SEROLOGIC RH(D): CPT | Performed by: FAMILY MEDICINE

## 2020-08-04 PROCEDURE — 36415 COLL VENOUS BLD VENIPUNCTURE: CPT | Performed by: FAMILY MEDICINE

## 2020-08-04 PROCEDURE — 99214 OFFICE O/P EST MOD 30 MIN: CPT | Performed by: FAMILY MEDICINE

## 2020-08-04 RX ORDER — ATORVASTATIN CALCIUM 10 MG/1
10 TABLET, FILM COATED ORAL DAILY
Qty: 90 TABLET | Refills: 3 | Status: SHIPPED | OUTPATIENT
Start: 2020-08-04 | End: 2021-07-23

## 2020-08-04 RX ORDER — LISINOPRIL 20 MG/1
20 TABLET ORAL DAILY
Qty: 90 TABLET | Refills: 3 | Status: SHIPPED | OUTPATIENT
Start: 2020-08-04 | End: 2021-07-23

## 2020-08-04 ASSESSMENT — MIFFLIN-ST. JEOR: SCORE: 1269.21

## 2020-08-04 NOTE — LETTER
August 6, 2020      Devon Watts  [address] [address]        Dear Devon,     Thank you for getting your labs done.  The numbers were all in a normal range except your kidney function is a little diminished.  I want you to increase your fluids and we can recheck this in a few weeks. Your blood type is A+.  Feel free to email or call if you have any questions.     Take care,     Nicole Magdaleno D.O./pv     Resulted Orders   ABO and Rh   Result Value Ref Range    ABO A     RH(D) Pos     Specimen Expires 08/07/2020     JODY  Broad Spectrum Canceled, Test credited    Hemoglobin A1c   Result Value Ref Range    Hemoglobin A1C 7.2 (H) 0 - 5.6 %      Comment:      Normal <5.7% Prediabetes 5.7-6.4%  Diabetes 6.5% or higher - adopted from ADA   consensus guidelines.     Basic metabolic panel  (Ca, Cl, CO2, Creat, Gluc, K, Na, BUN)   Result Value Ref Range    Sodium 142 133 - 144 mmol/L    Potassium 4.6 3.4 - 5.3 mmol/L    Chloride 109 94 - 109 mmol/L    Carbon Dioxide 29 20 - 32 mmol/L    Anion Gap 4 3 - 14 mmol/L    Glucose 140 (H) 70 - 99 mg/dL      Comment:      Non Fasting    Urea Nitrogen 11 7 - 30 mg/dL    Creatinine 1.29 (H) 0.66 - 1.25 mg/dL    GFR Estimate 56 (L) >60 mL/min/[1.73_m2]      Comment:      Non  GFR Calc  Starting 12/18/2018, serum creatinine based estimated GFR (eGFR) will be   calculated using the Chronic Kidney Disease Epidemiology Collaboration   (CKD-EPI) equation.      GFR Estimate If Black 64 >60 mL/min/[1.73_m2]      Comment:       GFR Calc  Starting 12/18/2018, serum creatinine based estimated GFR (eGFR) will be   calculated using the Chronic Kidney Disease Epidemiology Collaboration   (CKD-EPI) equation.      Calcium 9.0 8.5 - 10.1 mg/dL       If you have any questions or concerns, please call the clinic at the number listed above.

## 2020-08-04 NOTE — PATIENT INSTRUCTIONS
The 10-year ASCVD risk score (Ankit BERMUDEZ Jr., et al., 2013) is: 31.8%    Values used to calculate the score:      Age: 69 years      Sex: Male      Is Non- : Yes      Diabetic: Yes      Tobacco smoker: No      Systolic Blood Pressure: 134 mmHg      Is BP treated: Yes      HDL Cholesterol: 61 mg/dL      Total Cholesterol: 156 mg/dL

## 2020-08-04 NOTE — PROGRESS NOTES
Subjective     Devon Watts is a 69 year old male who presents to clinic today for the following health issues:    HPI       Diabetes Follow-up    How often are you checking your blood sugar? One time daily  What time of day are you checking your blood sugars (select all that apply)?  Before meals  Have you had any blood sugars above 200?  No  Have you had any blood sugars below 70?  No    What symptoms do you notice when your blood sugar is low?  None    What concerns do you have today about your diabetes? None     Do you have any of these symptoms? (Select all that apply)  No numbness or tingling in feet.  No redness, sores or blisters on feet.  No complaints of excessive thirst.  No reports of blurry vision.  No significant changes to weight.        Metformin 1000 mg twice daily        BP Readings from Last 2 Encounters:   08/04/20 134/65   11/19/19 135/68     Hemoglobin A1C (%)   Date Value   08/04/2020 7.2 (H)   11/19/2019 7.0 (H)     LDL Cholesterol Calculated (mg/dL)   Date Value   11/19/2019 80   06/04/2018 86           How many servings of fruits and vegetables do you eat daily?  2-3    On average, how many sweetened beverages do you drink each day (Examples: soda, juice, sweet tea, etc.  Do NOT count diet or artificially sweetened beverages)?   5    How many days per week do you exercise enough to make your heart beat faster? 7    How many minutes a day do you exercise enough to make your heart beat faster? 30 - 60    How many days per week do you miss taking your medication? 0    Patient would like know his blood types and wes types.    Hypertension Follow-up      Do you check your blood pressure regularly outside of the clinic? Yes     Are you following a low salt diet? Yes    Are your blood pressures ever more than 140 on the top number (systolic) OR more   than 90 on the bottom number (diastolic), for example 140/90? No    Lisinopril 20 mg daily      BP Readings from Last 3 Encounters:   08/04/20  "134/65   11/19/19 135/68   08/14/19 132/68    Wt Readings from Last 3 Encounters:   08/04/20 59.4 kg (131 lb)   11/19/19 58.8 kg (129 lb 9.6 oz)   08/14/19 59.4 kg (131 lb)                    Reviewed and updated as needed this visit by Provider         Review of Systems   Constitutional, HEENT, cardiovascular, pulmonary, GI, , musculoskeletal, neuro, skin, endocrine and psych systems are negative, except as otherwise noted.      Objective    /65 (BP Location: Right arm, Patient Position: Chair, Cuff Size: Adult Regular)   Pulse 86   Temp 98.3  F (36.8  C) (Oral)   Ht 1.632 m (5' 4.25\")   Wt 59.4 kg (131 lb)   SpO2 100%   BMI 22.31 kg/m    Body mass index is 22.31 kg/m .  Physical Exam   GENERAL: healthy, alert and no distress  NECK: no adenopathy, no asymmetry, masses, or scars and thyroid normal to palpation  RESP: lungs clear to auscultation - no rales, rhonchi or wheezes  CV: regular rate and rhythm, normal S1 S2, no S3 or S4, no murmur, click or rub, no peripheral edema and peripheral pulses strong  MS: no gross musculoskeletal defects noted, no edema  PSYCH: mentation appears normal, affect normal/bright    Diagnostic Test Results:  Labs reviewed in Epic        Assessment & Plan       ICD-10-CM    1. Type 2 diabetes mellitus without complication, without long-term current use of insulin (H)  E11.9 Hemoglobin A1c     metFORMIN (GLUCOPHAGE) 1000 MG tablet   2. Benign essential hypertension  I10 lisinopril (ZESTRIL) 20 MG tablet     Basic metabolic panel  (Ca, Cl, CO2, Creat, Gluc, K, Na, BUN)   3. Hyperlipidemia LDL goal <70  E78.5 atorvastatin (LIPITOR) 10 MG tablet   4. Patient request for diagnostic testing  Z01.89 ABO and Rh     The patient's diabetes and hypertension are well controlled on his current medications.  He takes tramadol for her chronic shoulder pain and will not need a refill for a few more months.    He was notified he may have to pay for blood testing      Return in about 6 " months (around 2/4/2021) for diabetes, [place].    Nicole Magdaleno DO  [facility]

## 2020-08-05 LAB
ABO + RH BLD: NORMAL
ABO + RH BLD: NORMAL
ANION GAP SERPL CALCULATED.3IONS-SCNC: 4 MMOL/L (ref 3–14)
BUN SERPL-MCNC: 11 MG/DL (ref 7–30)
CALCIUM SERPL-MCNC: 9 MG/DL (ref 8.5–10.1)
CHLORIDE SERPL-SCNC: 109 MMOL/L (ref 94–109)
CO2 SERPL-SCNC: 29 MMOL/L (ref 20–32)
CREAT SERPL-MCNC: 1.29 MG/DL (ref 0.66–1.25)
DAT POLY-SP REAG RBC QL: NORMAL
GFR SERPL CREATININE-BSD FRML MDRD: 56 ML/MIN/{1.73_M2}
GLUCOSE SERPL-MCNC: 140 MG/DL (ref 70–99)
POTASSIUM SERPL-SCNC: 4.6 MMOL/L (ref 3.4–5.3)
SODIUM SERPL-SCNC: 142 MMOL/L (ref 133–144)
SPECIMEN EXP DATE BLD: NORMAL

## 2020-09-22 DIAGNOSIS — M75.42 IMPINGEMENT SYNDROME OF BOTH SHOULDERS: ICD-10-CM

## 2020-09-22 DIAGNOSIS — M75.41 IMPINGEMENT SYNDROME OF BOTH SHOULDERS: ICD-10-CM

## 2020-09-22 RX ORDER — TRAMADOL HYDROCHLORIDE 50 MG/1
TABLET ORAL
Qty: 90 TABLET | Refills: 0 | Status: SHIPPED | OUTPATIENT
Start: 2020-09-22 | End: 2020-12-08

## 2020-09-22 NOTE — TELEPHONE ENCOUNTER
Routing refill request to provider for review/approval because:  Drug not on the [facility] refill protocol     Lurdes Oconnor RN, BSN, PHN  [facility]: [place]

## 2020-10-09 ENCOUNTER — ANCILLARY PROCEDURE (OUTPATIENT)
Dept: GENERAL RADIOLOGY | Facility: CLINIC | Age: 70
End: 2020-10-09
Attending: PHYSICIAN ASSISTANT
Payer: COMMERCIAL

## 2020-10-09 ENCOUNTER — OFFICE VISIT (OUTPATIENT)
Dept: URGENT CARE | Facility: URGENT CARE | Age: 70
End: 2020-10-09
Payer: COMMERCIAL

## 2020-10-09 VITALS
HEART RATE: 96 BPM | TEMPERATURE: 97.8 F | OXYGEN SATURATION: 100 % | SYSTOLIC BLOOD PRESSURE: 133 MMHG | BODY MASS INDEX: 22.51 KG/M2 | WEIGHT: 132.2 LBS | DIASTOLIC BLOOD PRESSURE: 66 MMHG | RESPIRATION RATE: 14 BRPM

## 2020-10-09 DIAGNOSIS — M54.50 ACUTE MIDLINE LOW BACK PAIN WITHOUT SCIATICA: Primary | ICD-10-CM

## 2020-10-09 DIAGNOSIS — M47.816 OSTEOARTHRITIS OF LUMBAR SPINE, UNSPECIFIED SPINAL OSTEOARTHRITIS COMPLICATION STATUS: ICD-10-CM

## 2020-10-09 PROCEDURE — 99214 OFFICE O/P EST MOD 30 MIN: CPT | Performed by: PHYSICIAN ASSISTANT

## 2020-10-09 PROCEDURE — 72100 X-RAY EXAM L-S SPINE 2/3 VWS: CPT | Performed by: RADIOLOGY

## 2020-10-09 RX ORDER — LIDOCAINE 50 MG/G
1 PATCH TOPICAL EVERY 24 HOURS
Qty: 15 PATCH | Refills: 0 | Status: SHIPPED | OUTPATIENT
Start: 2020-10-09 | End: 2023-09-18

## 2020-10-09 ASSESSMENT — ENCOUNTER SYMPTOMS: BACK PAIN: 1

## 2020-10-09 NOTE — PATIENT INSTRUCTIONS
Patient Education     Back Basics: A Healthy Spine    A healthy spine supports the body while letting it move freely. It does this with the help of three natural curves. Strong, flexible muscles help, too. They support the spine by keeping its curves properly aligned. The disks that cushion the bones of your spine also play a role in back fitness.  Three natural curves  The spine is made of bones (vertebrae) and pads of soft tissue (disks). These parts are arranged in three curves: cervical, thoracic, and lumbar. When properly aligned, these curves keep your body balanced. They also support your body when you move. By distributing your weight throughout your spine, the curves make back injuries less likely.  Strong, flexible muscles  Strong, flexible back muscles help support the three curves of the spine. They do so by holding the vertebrae and disks in proper alignment. Strong, flexible abdominal, hip, and leg muscles also reduce strain on the back.  The lumbar curve  The lumbar curve is the hardest-working part of the spine. It carries more weight and moves the most. Aligning this curve helps prevent damage to vertebrae, disks, and other parts of the spine.  Cushioning disks  Disks are the soft pads of tissue between the vertebrae. The disks absorb shock caused by movement. Each disk has a spongy center (nucleus) and a tougher outer ring (annulus). Movement within the nucleus allows the vertebrae to rock back and forth on the disks. This provides the flexibility needed to bend and move.    Date Last Reviewed: 5/1/2018 2000-2019 [employer]. [address] [place] [address]. All rights reserved. This information is not intended as a substitute for professional medical care. Always follow your healthcare professional's instructions.           Patient Education     Common Spine and Disk Problems  The most common serious back problems happen when disks tear, bulge, or rupture. In such  cases, an injured disk can no longer cushion the vertebrae and absorb shock. As a result, the rest of your spine may also weaken. This can lead to pain, stiffness, and other symptoms.                Torn annulus. A sudden movement may cause a tiny tear in an annulus. Nearby ligaments may stretch.    Contained herniated disk. As a disk wears out, the nucleus may bulge into the annulus and press on nerves.    Extruded herniated disk. When a disk ruptures, its nucleus can squeeze out and irritate a nerve.    Arthritis. As disks wear out over time, bone spurs form. These growths can irritate nerves and inflame facets.    Instability. As a disk stretches, the vertebrae slip back and forth. This can put pressure on the annulus.    Spondylolisthesis. This is a condition in which one vertebra has moved forward or backward, in relation to the one above or below it. This causes a crack (stress fracture) in the areas that link the vertebrae together. This may put pressure on the annulus, stretch the disk, and irritate nerves.  Date Last Reviewed: 6/1/2018 2000-2019 [employer]. [address] [place] [id number]. All rights reserved. This information is not intended as a substitute for professional medical care. Always follow your healthcare professional's instructions.

## 2020-10-09 NOTE — PROGRESS NOTES
SUBJECTIVE:   Devon Watts is a 69 year old male presenting with a chief complaint of   Chief Complaint   Patient presents with     Back Pain     lower back pain since last Sunday       He is an established patient of [facility].  Took 1,000 mg once a day and last took this afternoon with tramadol.      Back Pain    Onset of symptoms was 5 day(s) ago.  Location: left low back  Radiation: does not radiate  Context:       The injury happened while        Mechanism: none recalled by the patient      Patient experienced    Course of symptoms is same.    Severity moderate  Current and Associated symptoms: pain  Denies: fecal incontinence, urinary incontinence, lower extremity numbness, lower extremity weakness and paresthesia    Aggravating Factors: movement  Therapies to improve symptoms include: Tylenol  Past history: no prior back problems      PMHx, medications reviewed.      Review of Systems   Musculoskeletal: Positive for back pain.   All other systems reviewed and are negative.      No past medical history on file.  Family History   Problem Relation Age of Onset     Hypertension Son      Diabetes Son      Breast Cancer No family hx of      Colon Cancer No family hx of      Prostate Cancer No family hx of      Other Cancer No family hx of      Current Outpatient Medications   Medication Sig Dispense Refill     Acetaminophen (TYLENOL PO)        aspirin 81 MG tablet Take 81 mg by mouth daily       atorvastatin (LIPITOR) 10 MG tablet Take 1 tablet (10 mg) by mouth daily 90 tablet 3     lidocaine (LIDODERM) 5 % patch Place 1 patch onto the skin every 24 hours To prevent lidocaine toxicity, patient should be patch free for 12 hrs daily. 15 patch 0     lisinopril (ZESTRIL) 20 MG tablet Take 1 tablet (20 mg) by mouth daily 90 tablet 3     metFORMIN (GLUCOPHAGE) 1000 MG tablet Take 1 tablet (1,000 mg) by mouth 2 times daily (with meals) 180 tablet 1     traMADol (ULTRAM) 50 MG tablet TAKE 1 TABLET BY MOUTH EVERY 6 HOURS  AS NEEDED FOR PAIN 90 tablet 0     GAVILYTE-G 236 G suspension TK UTD PER HANDOUT  0     Social History     Tobacco Use     Smoking status: Never Smoker     Smokeless tobacco: Never Used   Substance Use Topics     Alcohol use: No       OBJECTIVE  /66 (BP Location: Left arm, Patient Position: Sitting, Cuff Size: Adult Regular)   Pulse 96   Temp 97.8  F (36.6  C) (Tympanic)   Resp 14   Wt 60 kg (132 lb 3.2 oz)   SpO2 100%   BMI 22.51 kg/m      Physical Exam  Vitals signs and nursing note reviewed.   Constitutional:       Appearance: Normal appearance. He is normal weight.   Eyes:      Extraocular Movements: Extraocular movements intact.      Conjunctiva/sclera: Conjunctivae normal.   Cardiovascular:      Rate and Rhythm: Normal rate.   Musculoskeletal:      Comments: Diffuse tenderness to palpation of middle back.  Painful for flexion and extension.  SLR negative.  Strength in LE normal.  Skin over back normal.   Skin:     General: Skin is warm and dry.      Capillary Refill: Capillary refill takes less than 2 seconds.   Neurological:      General: No focal deficit present.      Mental Status: He is alert.   Psychiatric:         Mood and Affect: Mood normal.         Labs:  Results for orders placed or performed in visit on 10/09/20 (from the past 24 hour(s))   XR Lumbar Spine 2/3 Views    Narrative    LUMBAR SPINE TWO-THREE  VIEWS  10/9/2020 6:31 PM     HISTORY: Acute midline low back pain without sciatica    COMPARISON: None.    FINDINGS: 5 lumbar type vertebrae. Small anterior osteophytes are seen  at L1-2, L2-3, L3-4, L4-5, and L5-S1. Degenerative changes seen in the  facet joints..      Impression    IMPRESSION: Multilevel degenerative changes.       X-Ray was done, my findings are: DJD  Xrays personally viewed by myself.        ASSESSMENT:      ICD-10-CM    1. Acute midline low back pain without sciatica  M54.5 XR Lumbar Spine 2/3 Views     lidocaine (LIDODERM) 5 % patch     PHYSICAL THERAPY REFERRAL    2. Osteoarthritis of lumbar spine, unspecified spinal osteoarthritis complication status  M47.816         Medical Decision Making:    Differential Diagnosis:  Back Pain: myofascial low back strain, degenerative disc disease and compression fracture    Serious Comorbid Conditions:  Adult:  as abobe    PLAN:    Back Pain: Rx for lidoderm patches.  PT;  Continue with tylenol, but increase to every 8 hours prn.      Followup:    If not improving or if condition worsens, follow up with your Primary Care Provider, If not improving or if conditions worsens over the next 12-24 hours, go to the Emergency Department    Patient Instructions       Patient Education     Back Basics: A Healthy Spine    A healthy spine supports the body while letting it move freely. It does this with the help of three natural curves. Strong, flexible muscles help, too. They support the spine by keeping its curves properly aligned. The disks that cushion the bones of your spine also play a role in back fitness.  Three natural curves  The spine is made of bones (vertebrae) and pads of soft tissue (disks). These parts are arranged in three curves: cervical, thoracic, and lumbar. When properly aligned, these curves keep your body balanced. They also support your body when you move. By distributing your weight throughout your spine, the curves make back injuries less likely.  Strong, flexible muscles  Strong, flexible back muscles help support the three curves of the spine. They do so by holding the vertebrae and disks in proper alignment. Strong, flexible abdominal, hip, and leg muscles also reduce strain on the back.  The lumbar curve  The lumbar curve is the hardest-working part of the spine. It carries more weight and moves the most. Aligning this curve helps prevent damage to vertebrae, disks, and other parts of the spine.  Cushioning disks  Disks are the soft pads of tissue between the vertebrae. The disks absorb shock caused by movement. Each  disk has a spongy center (nucleus) and a tougher outer ring (annulus). Movement within the nucleus allows the vertebrae to rock back and forth on the disks. This provides the flexibility needed to bend and move.    Date Last Reviewed: 5/1/2018 2000-2019 [employer]. [address] [place] [address]. All rights reserved. This information is not intended as a substitute for professional medical care. Always follow your healthcare professional's instructions.           Patient Education     Common Spine and Disk Problems  The most common serious back problems happen when disks tear, bulge, or rupture. In such cases, an injured disk can no longer cushion the vertebrae and absorb shock. As a result, the rest of your spine may also weaken. This can lead to pain, stiffness, and other symptoms.                Torn annulus. A sudden movement may cause a tiny tear in an annulus. Nearby ligaments may stretch.    Contained herniated disk. As a disk wears out, the nucleus may bulge into the annulus and press on nerves.    Extruded herniated disk. When a disk ruptures, its nucleus can squeeze out and irritate a nerve.    Arthritis. As disks wear out over time, bone spurs form. These growths can irritate nerves and inflame facets.    Instability. As a disk stretches, the vertebrae slip back and forth. This can put pressure on the annulus.    Spondylolisthesis. This is a condition in which one vertebra has moved forward or backward, in relation to the one above or below it. This causes a crack (stress fracture) in the areas that link the vertebrae together. This may put pressure on the annulus, stretch the disk, and irritate nerves.  Date Last Reviewed: 6/1/2018 2000-2019 [employer]. [address] [place] [id number]. All rights reserved. This information is not intended as a substitute for professional medical care. Always follow your healthcare professional's instructions.

## 2020-12-07 DIAGNOSIS — M75.42 IMPINGEMENT SYNDROME OF BOTH SHOULDERS: ICD-10-CM

## 2020-12-07 DIAGNOSIS — M75.41 IMPINGEMENT SYNDROME OF BOTH SHOULDERS: ICD-10-CM

## 2020-12-08 RX ORDER — TRAMADOL HYDROCHLORIDE 50 MG/1
TABLET ORAL
Qty: 90 TABLET | Refills: 0 | Status: SHIPPED | OUTPATIENT
Start: 2020-12-08 | End: 2021-02-23

## 2021-02-09 DIAGNOSIS — E11.9 TYPE 2 DIABETES MELLITUS WITHOUT COMPLICATION, WITHOUT LONG-TERM CURRENT USE OF INSULIN (H): ICD-10-CM

## 2021-02-09 NOTE — LETTER
February 12, 2021      Devon Watts  [address] [facility] [address]        Dear Devon,     We recently received a call from your pharmacy requesting a refill of your medication.     A review of your chart indicates that an appointment is required with your provider. Please call the clinic at [phone] to schedule your appointment.     We have authorized one refill of your medication to allow time for you to schedule. If you have a history of diabetes or high cholesterol, please come fasting to the appointment. Fasting entails nothing to eat or drink 8 hours prior to your appointment; with the exception of water. You may take your medication the day of the appointment.        Sincerely,        Nicole Magdaleno, DO

## 2021-02-11 NOTE — TELEPHONE ENCOUNTER
Routing refill request to provider for review/approval because:  Labs out of range:    Hemoglobin A1C   Date Value Ref Range Status   08/04/2020 7.2 (H) 0 - 5.6 % Final     Comment:     Normal <5.7% Prediabetes 5.7-6.4%  Diabetes 6.5% or higher - adopted from ADA   consensus guidelines.         Greater than 6 months since last office visit, due for follow up  Lurdes Oconnor RN, BSN, PHN  [facility]: [place]

## 2021-02-12 NOTE — TELEPHONE ENCOUNTER
Please have this patient make a diabetes follow-up appointment with lab.  Please reassure him Covid is at an all-time low.  He could get a Covid vaccine through one of the area pharmacy.    Nicole Magdaleno DO

## 2021-02-12 NOTE — TELEPHONE ENCOUNTER
Left message on answering machine for patient to call back and schedule a physical exam.  Letter mailed to patient's home address.  Viri Pang CMA ([facility])

## 2021-02-22 DIAGNOSIS — M75.41 IMPINGEMENT SYNDROME OF BOTH SHOULDERS: ICD-10-CM

## 2021-02-22 DIAGNOSIS — M75.42 IMPINGEMENT SYNDROME OF BOTH SHOULDERS: ICD-10-CM

## 2021-02-23 RX ORDER — TRAMADOL HYDROCHLORIDE 50 MG/1
TABLET ORAL
Qty: 90 TABLET | Refills: 0 | Status: SHIPPED | OUTPATIENT
Start: 2021-02-23 | End: 2021-05-04

## 2021-02-23 NOTE — TELEPHONE ENCOUNTER
Routing refill request to provider for review/approval because:  Drug not on the [facility] refill protocol     Pending Prescriptions:                       Disp   Refills    traMADol (ULTRAM) 50 MG tablet [Pharmacy M*90 tab*         Sig: TAKE 1 TABLET BY MOUTH EVERY 6 HOURS AS NEEDED FOR           PAIN        Saray Ann RN

## 2021-02-28 NOTE — PROGRESS NOTES
SUBJECTIVE:   Devon Watts is a 67 year old male who presents to clinic today for the following health issues:      Diabetes Follow-up    Patient is checking blood sugars: once daily.  Results are as follows:         117 last night, 68 in the morning once     Diabetic concerns: None     Symptoms of hypoglycemia (low blood sugar): none     Paresthesias (numbness or burning in feet) or sores: No     Date of last diabetic eye exam: 2016    Glipizide 10 mg BID.     He is nervous about decreasing his glipizide.    Hyperlipidemia Follow-Up      Rate your low fat/cholesterol diet?: good    Taking statin?  No    Other lipid medications/supplements?:  none    Hypertension Follow-up      Outpatient blood pressures are being checked at work.  Results are 120/.60's    Low Salt Diet: no added salt    BP Readings from Last 2 Encounters:   06/04/18 121/68   04/06/18 128/66     Hemoglobin A1C (%)   Date Value   06/04/2018 5.9 (H)   11/20/2017 6.4 (H)     LDL Cholesterol Calculated (mg/dL)   Date Value   06/04/2018 86     LDL Cholesterol Direct (mg/dL)   Date Value   06/22/2017 77   09/09/2016 86     Shoulder impingement:  He states that he pushes some heavy carts at work, and this seems to flare the pain. He takes about one tramadol per day, and he will take it before he goes to work. This seems to be managing his pain well.       Amount of exercise or physical activity: walks daily     Problems taking medications regularly: No    Medication side effects: none    Diet: regular (no restrictions)        Problem list and histories reviewed & adjusted, as indicated.  Additional history: as documented    BP Readings from Last 3 Encounters:   06/04/18 121/68   04/06/18 128/66   11/20/17 120/60    Wt Readings from Last 3 Encounters:   06/04/18 130 lb (59 kg)   04/06/18 133 lb 4 oz (60.4 kg)   11/20/17 133 lb (60.3 kg)                    Reviewed and updated as needed this visit by clinical staff  Tobacco  Allergies  Meds  Med Hx   Telephone Progress Note  2/28/2021  Due to the COVID 19 restrictions we are now doing telephone visits. Patient gave consent to proceed with this visit.   This visit was performed via live interactive two-way telephone.    During their visit, the patient was informed that their consent to treat includes permission to submit claims to their insurance on their behalf for the services received.  Clinician Location: [facility]    Patient Location: Home    HPI:    Gunjan is a 58 year old female that is calling from their home for the following:   The encounter diagnosis was Anxiety and depression. She has a current medication list which includes the following prescription(s): meloxicam, paroxetine, lorazepam, and hydrocodone-acetaminophen.  She was too afraid to use the Ativan. She'd prefer Xanax because she has had this in the past. However, she feels that she is doing better emotionally. She has dealt with depression an anxiety her whole adult life. She was hurt at work, and recently had shoulder surgery.  Recently, she found out that her daughter's  was having an affair now they are getting a divorce.  She states this is very hard on her grandchild.  Prior to this she was doing well and even tapered her dose of Paxil herself to 5mg alternating every other day with 10mg.  She is back up to taking 20mg of Paxil daily.  She has had no chest pain or SOB. Gunjan states she has not been having any suicidal or homicidal thoughts.  She does not drink alcoholic beverages.     Bipolar Screen: Denies episodes of marine, hypomania, and major depression.    Current Medications    HYDROCODONE-ACETAMINOPHEN (NORCO) 7.5-325 MG PER TABLET    Take 1 to 2 tablets by mouth every 6 hours as needed for Pain.    LORAZEPAM (ATIVAN) 0.5 MG TABLET    Take 1 tablet by mouth every 6 hours as needed for Anxiety.    MELOXICAM (MOBIC) 15 MG TABLET    Take 1 tablet by mouth daily.    PAROXETINE (PAXIL) 20 MG TABLET     "Surg Hx  Fam Hx  Soc Hx      Reviewed and updated as needed this visit by Provider         ROS:  Constitutional, HEENT, cardiovascular, pulmonary, gi and gu systems are negative, except as otherwise noted.    This document serves as a record of the services and decisions personally performed by IFEOMA NORIEGA. It was created on his/her behalf by Lurdes Olsen, a trained medical scribe. The creation of this document is based on the provider's statements to the medical scribe. Lurdes Olsen, June 4, 2018 3:09 PM    OBJECTIVE:     /68  Pulse 92  Temp 97.8  F (36.6  C) (Oral)  Ht 5' 4.5\" (1.638 m)  Wt 130 lb (59 kg)  SpO2 100%  BMI 21.97 kg/m2  Body mass index is 21.97 kg/(m^2).  HENT: ear canals and TM's normal, nose and mouth without ulcers or lesions  NECK: no adenopathy, no asymmetry, masses, or scars and thyroid normal to palpation  RESP: lungs clear to auscultation - no rales, rhonchi or wheezes  CV: regular rate and rhythm, normal S1 S2, no S3 or S4, no murmur, click or rub, no peripheral edema and peripheral pulses strong  PSYCH: mentation appears normal, affect normal/bright  Diabetic foot exam: normal DP and PT pulses, no trophic changes or ulcerative lesions, normal sensory exam and normal monofilament exam    Diagnostic Test Results:  No results found for this or any previous visit (from the past 24 hour(s)).    ASSESSMENT/PLAN:     Diabetes Type II, A1c Controlled, non-insulin dependent   Associated with the following complications    Renal Complications:  None    Ophthalmologic Complications: None    Neurologic Complications: None    Peripheral Vascular Complications:  None    Other: None   Plan:  Medications:  Glipizide - I decreased the patient's Glipizide to 10 mg daily.       Hyperlipidemia; controlled   Plan:  No changes in the patient's current treatment plan    Hypertension; controlled   Associated with the following complications:    Diabetes   Plan:  No changes in the patient's current " Take 1 tablet by mouth daily.     ALLERGIES:   Allergen Reactions   • Ceclor [Cefaclor] ANAPHYLAXIS   • Nickel PRURITUS   • Penicillins SWELLING and PRURITUS   • Sulfa Antibiotics HIVES     Over the last 2 weeks, how often have you been bothered by the following problems?          PHQ2 Score: 3  PHQ2 Score Interpretation: Further screening needed  1. Little interest or pleasure in activity?: 2  2. Feeling down, depressed, or hopeless?: 1     PHQ9 Score: 5  PHQ9 Score Interpretation: Mild Depression  3. Trouble falling, staying asleep or sleeping all the time?: 1  4. Feeling tired or having little energy?: 1  5. Poor appetite or overeating?: 0  6. Feeling bad about yourself - or that you are a failure or that you have let yourself or your family down?: 0  7. Trouble concentrating on things such as reading a newspaper or watching television?: 0  8. Moving or speaking so slowly that other people could have noticed? Or the opposite - being so fidgety or restless that you were moving around a lot more than usual?: 0  9. Thoughts that you would be better off dead, or of hurting yourself in some way?: 0        Patient Active Problem List   Diagnosis   • Dysthymic disorder   • S/P arthroscopy of shoulder   • Cervical high risk HPV (human papillomavirus) test positive      Social History     Tobacco Use   • Smoking status: Current Every Day Smoker     Packs/day: 0.25     Years: 35.00     Pack years: 8.75     Types: Cigarettes   • Smokeless tobacco: Never Used   Substance Use Topics   • Alcohol use: No     Past Medical History:   Diagnosis Date   • Anxiety    • Depression    • Failed moderate sedation during procedure     slow to wake   • Hyperlipidemia    • Menopause       Family History   Problem Relation Age of Onset   • Cancer, Colon Mother    • Heart Father         MI   • Diabetes Father    • Cancer, Colon Brother    • Alcohol Abuse Maternal Grandfather    • Dementia/Alzheimers Maternal Grandfather    • Alcohol Abuse  Paternal Grandfather    • Cancer Maternal Aunt         breast and uterine   • Cancer Maternal Uncle      REVIEW OF SYSTEMS:  All ROS negative except as above in the HPI        ASSESSMENT/PLAN:  Diagnoses and all orders for this visit:    Anxiety and depression - Stable  Plan: Patient was educated regarding the nature of the medical condition(s), treatment options, and follow up recommendations.  Continue current medication(s). Continue to monitor.   Follow up in 3 months.    Patient was educated regarding the nature of the medical condition(s), treatment options, and follow up recommendations. The patient will call or return if symptoms do not resolve or worsen. Patient verbalized understanding. See patient instruction section below.                      I spent 11-20 minutes in telephone discussion with the patient.     Medical compliance with plan discussed and risks of non-compliance reviewed.  Patient education completed on disease process, etiology & prognosis.  Proper usage and side effects of medications reviewed & discussed  Patient verbalizes understanding and in agreement with plan.    Katiana Ambriz NP     treatment plan        ICD-10-CM    1. Type 2 diabetes mellitus without complication, without long-term current use of insulin (H) E11.9 HEMOGLOBIN A1C     Lipid panel reflex to direct LDL Non-fasting     Albumin Random Urine Quantitative with Creat Ratio     OPTOMETRY REFERRAL     metFORMIN (GLUCOPHAGE) 1000 MG tablet     Basic metabolic panel  (Ca, Cl, CO2, Creat, Gluc, K, Na, BUN)     glipiZIDE (GLIPIZIDE XL) 10 MG 24 hr tablet   2. Benign essential hypertension I10 lisinopril (PRINIVIL/ZESTRIL) 20 MG tablet     Basic metabolic panel  (Ca, Cl, CO2, Creat, Gluc, K, Na, BUN)   3. Impingement syndrome of both shoulders M75.41 traMADol (ULTRAM) 50 MG tablet    M75.42    4. Screening for diabetic peripheral neuropathy Z13.89 FOOT EXAM  NO CHARGE [40859.114]     I decreased the patient's Glipizide to 10 mg daily. I refilled his tramadol for 3 months. I refilled his lisinopril and metformin. His diabetes, HTN, and hyperlipidemia are well controlled. He is no longer taking a statin as his LDL was 77. He will follow up with me in 6 months for chronic care.     Patient Instructions     Call me in 3 months for a tramadol refill.     Follow up with me in 6 months for hypertension and diabetes.     If you can, it is helpful if you fill out a survey about your clinic visit if it is mailed to your house in a few weeks    [facility]   Discharged by : Maricruz WESTBROOK Certified Medical Assistant (AAMA)   Paper scripts provided to patient : 1   If you have any questions regarding to your visit please contact your care team:     Team Silver              Clinic Hours Telephone Number     Dr. Girma Byrd PA-C   7am-7pm  Monday - Thursday   7am-5pm  Fridays  [phone]   (Appointment scheduling available 24/7)     RN Line  [phone] option 2     Urgent Care - Savita Michael and Tamir Michael - 11am-9pm Monday-Friday Saturday-Sunday- 9am-5pm     [facility]    5pm-9pm Monday-Friday Saturday-Sunday- 9am-5pm    [phone] - Savita Michael    [phone] - [place]     For a Price Quote for your services, please call our Consumer Price Line at [phone].     What options do I have for visits at the clinic other than the traditional office visit?     To expand how we care for you, many of our providers are utilizing electronic visits (e-visits) and telephone visits, when medically appropriate, for interactions with their patients rather than a visit in the clinic. We also offer nurse visits for many medical concerns. Just like any other service, we will bill your insurance company for this type of visit based on time spent on the phone with your provider. Not all insurance companies cover these visits. Please check with your medical insurance if this type of visit is covered. You will be responsible for any charges that are not paid by your insurance.   E-visits via [employer]: generally incur a $35.00 fee.     Telephone visits:   Time spent on the phone: *charged based on time that is spent on the phone in increments of 10 minutes. Estimated cost:   5-10 mins $30.00   11-20 mins. $59.00   21-30 mins. $85.00     Use Net[employer]en[employer]ht (secure email communication and access to your chart) to send your primary care provider a message or make an appointment. Ask someone on your Team how to sign up for Net[employer]en[employer]ht.     As always, Thank you for trusting us with your health care needs!      [facility]:    Scheduling Appointments  [place]  Call: [phone]    [facility]  Call: [phone]    [facility]  Call: [phone]    For Gastroenterology referrals   [facility]   Clinics and Surgery Center, 4th Floor   [address] [place] [address]   Appointments: [phone]    WHERE TO GO FOR CARE?  Clinic    Make an appointment if you:       Are sick (cold, cough,  flu, sore throat, earache or in pain).       Have a small injury (sprain, small cut, burn or broken bone).       Need a physical exam, Pap smear, vaccine or prescription refill.       Have questions about your health or medicines.    To reach us:      Call [phone] ([phone]). Open 24 hours every day. (For counseling services, call [phone].)    Log into [employer] at [employer]. (Visit [email or web address] to create an account.) Hospital emergency room    An emergency is a serious or life- threatening problem that must be treated right away.    Call 041 or get to the hospital if you have:      Very bad or sudden:            - Chest pain or pressure         - Bleeding         - Head or belly pain         - Dizziness or trouble seeing, walking or                          Speaking      Problems breathing      Blood in your vomit or you are coughing up blood      A major injury (knocked out, loss of a finger or limb, rape, broken bone protruding from skin)    A mental health crisis. (Or call the Mental Health Crisis line at 1-723.716.2666 or Suicide Prevention Hotline at 1-965.432.6774.)    Open 24 hours every day. You don't need an appointment.     Urgent care    Visit urgent care for sickness or small injuries when the clinic is closed. You don't need an appointment. To check hours or find an urgent care near you, visit [email or web address]. Online care    Get online care from [facility] for more than 70 common problems, like colds, allergies and infections. Open 24 hours every day at:   [email or web address]   Need help deciding?    For advice about where to be seen, you may call your clinic and ask to speak with a nurse. We're here for you 24 hours every day.         If you are deaf or hard of hearing, please let us know. We provide many free services including sign language interpreters, oral interpreters, TTYs, telephone amplifiers, note takers and written materials.           Nicole Magdaleno, DO HOYOS  [facility]    The information in this document, created by the medical scribe Lurdes Olsen for me, accurately reflects the services I personally performed and the decisions made by me. I have reviewed and approved this document for accuracy prior to leaving the patient care area.

## 2021-03-08 ENCOUNTER — OFFICE VISIT (OUTPATIENT)
Dept: FAMILY MEDICINE | Facility: CLINIC | Age: 71
End: 2021-03-08
Payer: COMMERCIAL

## 2021-03-08 VITALS
SYSTOLIC BLOOD PRESSURE: 138 MMHG | OXYGEN SATURATION: 100 % | WEIGHT: 134.8 LBS | BODY MASS INDEX: 22.46 KG/M2 | HEART RATE: 85 BPM | TEMPERATURE: 98.3 F | DIASTOLIC BLOOD PRESSURE: 80 MMHG | HEIGHT: 65 IN

## 2021-03-08 DIAGNOSIS — E11.9 TYPE 2 DIABETES MELLITUS WITHOUT COMPLICATION, WITHOUT LONG-TERM CURRENT USE OF INSULIN (H): ICD-10-CM

## 2021-03-08 DIAGNOSIS — I10 BENIGN ESSENTIAL HYPERTENSION: ICD-10-CM

## 2021-03-08 DIAGNOSIS — N18.30 STAGE 3 CHRONIC KIDNEY DISEASE, UNSPECIFIED WHETHER STAGE 3A OR 3B CKD (H): ICD-10-CM

## 2021-03-08 DIAGNOSIS — M75.42 IMPINGEMENT SYNDROME OF BOTH SHOULDERS: ICD-10-CM

## 2021-03-08 DIAGNOSIS — M75.41 IMPINGEMENT SYNDROME OF BOTH SHOULDERS: ICD-10-CM

## 2021-03-08 DIAGNOSIS — Z00.00 ENCOUNTER FOR MEDICARE ANNUAL WELLNESS EXAM: Primary | ICD-10-CM

## 2021-03-08 DIAGNOSIS — E78.5 HYPERLIPIDEMIA WITH TARGET LDL LESS THAN 100: ICD-10-CM

## 2021-03-08 LAB — HBA1C MFR BLD: 7.2 % (ref 0–5.6)

## 2021-03-08 PROCEDURE — 80061 LIPID PANEL: CPT | Performed by: FAMILY MEDICINE

## 2021-03-08 PROCEDURE — 99397 PER PM REEVAL EST PAT 65+ YR: CPT | Performed by: FAMILY MEDICINE

## 2021-03-08 PROCEDURE — 83036 HEMOGLOBIN GLYCOSYLATED A1C: CPT | Performed by: FAMILY MEDICINE

## 2021-03-08 PROCEDURE — 80048 BASIC METABOLIC PNL TOTAL CA: CPT | Performed by: FAMILY MEDICINE

## 2021-03-08 PROCEDURE — 36415 COLL VENOUS BLD VENIPUNCTURE: CPT | Performed by: FAMILY MEDICINE

## 2021-03-08 PROCEDURE — 82043 UR ALBUMIN QUANTITATIVE: CPT | Performed by: FAMILY MEDICINE

## 2021-03-08 ASSESSMENT — MIFFLIN-ST. JEOR: SCORE: 1285.39

## 2021-03-08 ASSESSMENT — PAIN SCALES - GENERAL: PAINLEVEL: NO PAIN (0)

## 2021-03-08 NOTE — PATIENT INSTRUCTIONS
Patient Education   Personalized Prevention Plan  You are due for the preventive services outlined below.  Your care team is available to assist you in scheduling these services.  If you have already completed any of these items, please share that information with your care team to update in your medical record.  Health Maintenance Due   Topic Date Due     Discuss Advance Care Planning  Never done     Eye Exam  Never done     Zoster (Shingles) Vaccine (1 of 2) Never done     AORTIC ANEURYSM SCREENING (SYSTEM ASSIGNED)  Never done     FALL RISK ASSESSMENT  06/04/2019     Diabetic Foot Exam  01/03/2020     URINE DRUG SCREEN  01/03/2020     Cholesterol Lab  11/19/2020     PHQ-2  01/01/2021     COVID-19 Vaccine (2 of 2 - Moderna series) 02/03/2021     Preventive Health Recommendations  See your health care provider every year to    Review health changes.     Discuss preventive care.      Review your medicines if your doctor has prescribed any.    Talk with your health care provider about whether you should have a test to screen for prostate cancer (PSA).    Every 3 years, have a diabetes test (fasting glucose). If you are at risk for diabetes, you should have this test more often.    Every 5 years, have a cholesterol test. Have this test more often if you are at risk for high cholesterol or heart disease.     Every 10 years, have a colonoscopy. Or, have a yearly FIT test (stool test). These exams will check for colon cancer.    Talk to with your health care provider about screening for Abdominal Aortic Aneurysm if you have a family history of AAA or have a history of smoking.    Shots:     Get a flu shot each year.     Get a tetanus shot every 10 years.     Talk to your doctor about your pneumonia vaccines. There are now two you should receive - Pneumovax (PPSV 23) and Prevnar (PCV 13).    Talk to your pharmacist about a shingles vaccine.     Talk to your doctor about the hepatitis B vaccine.    Nutrition:     Eat at  least 5 servings of fruits and vegetables each day.     Eat whole-grain bread, whole-wheat pasta and brown rice instead of white grains and rice.     Get adequate Calcium and Vitamin D.     Lifestyle    Exercise for at least 150 minutes a week (30 minutes a day, 5 days a week). This will help you control your weight and prevent disease.     Limit alcohol to one drink per day.     No smoking.     Wear sunscreen to prevent skin cancer.     See your dentist every six months for an exam and cleaning.     See your eye doctor every 1 to 2 years to screen for conditions such as glaucoma, macular degeneration and cataracts.    Personalized Prevention Plan  You are due for the preventive services outlined below.  Your care team is available to assist you in scheduling these services.  If you have already completed any of these items, please share that information with your care team to update in your medical record.  Health Maintenance   Topic Date Due     ADVANCE CARE PLANNING  Never done     EYE EXAM  Never done     ZOSTER IMMUNIZATION (1 of 2) Never done     AORTIC ANEURYSM SCREENING (SYSTEM ASSIGNED)  Never done     FALL RISK ASSESSMENT  06/04/2019     DIABETIC FOOT EXAM  01/03/2020     URINE DRUG SCREEN  01/03/2020     LIPID  11/19/2020     PHQ-2  01/01/2021     COVID-19 Vaccine (2 of 2 - Moderna series) 02/03/2021     A1C  06/08/2021     BMP  08/04/2021     MEDICARE ANNUAL WELLNESS VISIT  03/08/2022     MICROALBUMIN  03/08/2022     COLORECTAL CANCER SCREENING  07/07/2022     DTAP/TDAP/TD IMMUNIZATION (3 - Td) 11/09/2025     HEPATITIS C SCREENING  Completed     INFLUENZA VACCINE  Completed     Pneumococcal Vaccine: Pediatrics (0 to 5 Years) and At-Risk Patients (6 to 64 Years)  Completed     Pneumococcal Vaccine: 65+ Years  Completed     IPV IMMUNIZATION  Aged Out     MENINGITIS IMMUNIZATION  Aged Out

## 2021-03-08 NOTE — PROGRESS NOTES
"  SUBJECTIVE:   Devon Watts is a 70 year old male who presents for Preventive Visit.      Patient has been advised of split billing requirements and indicates understanding: Yes  Are you in the first 12 months of your Medicare Part B coverage?  No    Physical Health:    In general, how would you rate your overall physical health? good    Outside of work, how many days during the week do you exercise? 2-3 days/week    Outside of work, approximately how many minutes a day do you exercise?15-30 minutes    If you drink alcohol do you typically have >3 drinks per day or >7 drinks per week? No    Do you usually eat at least 4 servings of fruit and vegetables a day, include whole grains & fiber and avoid regularly eating high fat or \"junk\" foods? Yes    Do you have any problems taking medications regularly?  No    Do you have any side effects from medications? none    Needs assistance for the following daily activities: no assistance needed    Which of the following safety concerns are present in your home?  none identified     Hearing impairment: No    In the past 6 months, have you been bothered by leaking of urine? no    Mental Health:    In general, how would you rate your overall mental or emotional health? excellent  PHQ-2 Score:      Do you feel safe in your environment? Yes    Have you ever done Advance Care Planning? (For example, a Health Directive, POLST, or a discussion with a medical provider or your loved ones about your wishes): No, advance care planning information given to patient to review.  Patient declined advance care planning discussion at this time.    Additional concerns to address?  No    Fall risk:  Fallen 2 or more times in the past year?: No  Any fall with injury in the past year?: No    Cognitive Screenin) Repeat 3 items (Leader, Season, Table)    2) Clock draw: NORMAL  3) 3 item recall: Recalls 2 objects   Results: NORMAL clock, 1-2 items recalled: COGNITIVE IMPAIRMENT LESS " LIKELY    Mini-CogTM Copyright BRET Glaser. Licensed by the author for use in [facility]; reprinted with permission (eryn@.[facility]). All rights reserved.      Do you have sleep apnea, excessive snoring or daytime drowsiness?: no    Diabetes Follow-up    How often are you checking your blood sugar? One time daily  What time of day are you checking your blood sugars (select all that apply)?  Before and after meals  Have you had any blood sugars above 200?  No  Have you had any blood sugars below 70?  No    What symptoms do you notice when your blood sugar is low?  Shaky, Dizzy and Weak    What concerns do you have today about your diabetes? None     Do you have any of these symptoms? (Select all that apply)  No numbness or tingling in feet.  No redness, sores or blisters on feet.  No complaints of excessive thirst.  No reports of blurry vision.  No significant changes to weight.    Have you had a diabetic eye exam in the last 12 months? No   Lab Results   Component Value Date    A1C 7.2 03/08/2021    A1C 7.2 08/04/2020    A1C 7.0 11/19/2019    A1C 6.2 01/03/2019    A1C 5.9 06/04/2018               Hyperlipidemia Follow-Up      Are you regularly taking any medication or supplement to lower your cholesterol?   Yes- Lipitor    Are you having muscle aches or other side effects that you think could be caused by your cholesterol lowering medication?  No   LDL Cholesterol Calculated   Date Value Ref Range Status   11/19/2019 80 <100 mg/dL Final     Comment:     Desirable:       <100 mg/dl             Hypertension Follow-up      Do you check your blood pressure regularly outside of the clinic? Yes     Are you following a low salt diet? Yes    Are your blood pressures ever more than 140 on the top number (systolic) OR more   than 90 on the bottom number (diastolic), for example 140/90? No    BP Readings from Last 2 Encounters:   03/08/21 138/80   10/09/20 133/66     Hemoglobin A1C (%)   Date Value   03/08/2021 7.2 (H)    08/04/2020 7.2 (H)     LDL Cholesterol Calculated (mg/dL)   Date Value   11/19/2019 80   06/04/2018 86       He has chronic pain in his shoulder and has been on tramadol for many years for this.  It was started elsewhere.     He has swollen gums with some bleeding.  He is reluctant to go the dentist.      Reviewed and updated as needed this visit by clinical staff  Tobacco  Allergies  Meds   Med Hx  Surg Hx  Fam Hx  Soc Hx        Reviewed and updated as needed this visit by Provider                Social History     Tobacco Use     Smoking status: Never Smoker     Smokeless tobacco: Never Used   Substance Use Topics     Alcohol use: No                           Current providers sharing in care for this patient include:   Patient Care Team:  Nicole Magdaleno DO as PCP - General (Family Practice)  Nicole Magdaleno DO as Assigned PCP    The following health maintenance items are reviewed in Epic and correct as of today:  Health Maintenance   Topic Date Due     ADVANCE CARE PLANNING  Never done     EYE EXAM  Never done     ZOSTER IMMUNIZATION (1 of 2) Never done     AORTIC ANEURYSM SCREENING (SYSTEM ASSIGNED)  Never done     FALL RISK ASSESSMENT  06/04/2019     DIABETIC FOOT EXAM  01/03/2020     URINE DRUG SCREEN  01/03/2020     LIPID  11/19/2020     A1C  06/08/2021     BMP  08/04/2021     MEDICARE ANNUAL WELLNESS VISIT  03/08/2022     MICROALBUMIN  03/08/2022     COLORECTAL CANCER SCREENING  07/07/2022     DTAP/TDAP/TD IMMUNIZATION (3 - Td) 11/09/2025     HEPATITIS C SCREENING  Completed     PHQ-2  Completed     INFLUENZA VACCINE  Completed     Pneumococcal Vaccine: Pediatrics (0 to 5 Years) and At-Risk Patients (6 to 64 Years)  Completed     Pneumococcal Vaccine: 65+ Years  Completed     COVID-19 Vaccine  Completed     IPV IMMUNIZATION  Aged Out     MENINGITIS IMMUNIZATION  Aged Out     BP Readings from Last 3 Encounters:   03/08/21 138/80   10/09/20 133/66   08/04/20 134/65    Wt Readings from Last 3 Encounters:  "  03/08/21 61.1 kg (134 lb 12.8 oz)   10/09/20 60 kg (132 lb 3.2 oz)   08/04/20 59.4 kg (131 lb)                  Pneumonia Vaccine:Adults age 65+ who received Pneumovax (PPSV23) at 65 years or older: Should be given PCV13 > 1 year after their most recent PPSV23    ROS:  Constitutional, HEENT, cardiovascular, pulmonary, GI, , musculoskeletal, neuro, skin, endocrine and psych systems are negative, except as otherwise noted.    OBJECTIVE:   /80 (BP Location: Right arm, Patient Position: Sitting, Cuff Size: Adult Regular)   Pulse 85   Temp 98.3  F (36.8  C) (Oral)   Ht 1.638 m (5' 4.5\")   Wt 61.1 kg (134 lb 12.8 oz)   SpO2 100%   BMI 22.78 kg/m   Estimated body mass index is 22.78 kg/m  as calculated from the following:    Height as of this encounter: 1.638 m (5' 4.5\").    Weight as of this encounter: 61.1 kg (134 lb 12.8 oz).  EXAM:   GENERAL: healthy, alert and no distress  EYES: Eyes grossly normal to inspection, PERRL and conjunctivae and sclerae normal  HENT: ear canals and TM's normal, nose and mouth without ulcers or lesions  NECK: no adenopathy, no asymmetry, masses, or scars and thyroid normal to palpation  RESP: lungs clear to auscultation - no rales, rhonchi or wheezes  CV: regular rate and rhythm, normal S1 S2, no S3 or S4, no murmur, click or rub, no peripheral edema and peripheral pulses strong  ABDOMEN: soft, nontender, no hepatosplenomegaly, no masses and bowel sounds normal  MS: no gross musculoskeletal defects noted, no edema  SKIN: no suspicious lesions or rashes  NEURO: Normal strength and tone, mentation intact and speech normal  PSYCH: mentation appears normal, affect normal/bright  Diabetic foot exam: normal DP and PT pulses, no trophic changes or ulcerative lesions, normal sensory exam and normal monofilament exam    Diagnostic Test Results:  Labs reviewed in Epic  Results for orders placed or performed in visit on 03/08/21 (from the past 24 hour(s))   HEMOGLOBIN A1C   Result Value " "Ref Range    Hemoglobin A1C 7.2 (H) 0 - 5.6 %       ASSESSMENT / PLAN:   (Z00.00) Encounter for Medicare annual wellness exam  (primary encounter diagnosis)  Comment:   Plan:     (E11.9) Type 2 diabetes mellitus without complication, without long-term current use of insulin (H)  Comment:   Plan: The current medical regimen is effective;  continue present plan and medications.    (N18.30) Stage 3 chronic kidney disease, unspecified whether stage 3a or 3b CKD  Comment: We will keep his blood pressure and diabetes optimally  controlled and encourage hydration  Plan: Basic metabolic panel  (Ca, Cl, CO2, Creat,         Gluc, K, Na, BUN)            (E78.5) Hyperlipidemia with target LDL less than 100  Comment: The current medical regimen is effective;  continue present plan and medications.  He declines refills at this time  Plan: Lipid panel reflex to direct LDL Fasting            (I10) Benign essential hypertension  Comment: No refills needed at this time  Plan: The current medical regimen is effective;  continue present plan and medications.      (M75.41,  M75.42) Impingement syndrome of both shoulders  Comment:   Plan: The patient was started by another provider on tramadol for this.  90 tabs last him 3 months.  He has done physical therapy but finds this is helpful so does been continued    Patient has been advised of split billing requirements and indicates understanding: No    COUNSELING:  Reviewed preventive health counseling, as reflected in patient instructions       Regular exercise       Healthy diet/nutrition    Estimated body mass index is 22.78 kg/m  as calculated from the following:    Height as of this encounter: 1.638 m (5' 4.5\").    Weight as of this encounter: 61.1 kg (134 lb 12.8 oz).        He reports that he has never smoked. He has never used smokeless tobacco.    Appropriate preventive services were discussed with this patient, including applicable screening as appropriate for cardiovascular " disease, diabetes, osteopenia/osteoporosis, and glaucoma.  As appropriate for age/gender, discussed screening for colorectal cancer, prostate cancer, breast cancer, and cervical cancer. Checklist reviewing preventive services available has been given to the patient.    Reviewed patients plan of care and provided an AVS. The Basic Care Plan (routine screening as documented in Health Maintenance) for Devon meets the Care Plan requirement. This Care Plan has been established and reviewed with the Patient.    Counseling Resources:  ATP IV Guidelines  Pooled Cohorts Equation Calculator  Breast Cancer Risk Calculator  BRCA-Related Cancer Risk Assessment: FHS-7 Tool  FRAX Risk Assessment  ICSI Preventive Guidelines  Dietary Guidelines for Americans, 2010  USDA's MyPlate  ASA Prophylaxis  Lung CA Screening    DO SHERWIN Hwang [facility]

## 2021-03-08 NOTE — LETTER
March 9, 2021      Devon Watts  [address] [address]        Dear ,    The results of your recent lab tests were within normal limits. Enclosed is a copy of these results.  If you have any further questions or problems, please contact our office.      Sincerely,      Nicole Magdaleno, DO/pv      Resulted Orders   HEMOGLOBIN A1C   Result Value Ref Range    Hemoglobin A1C 7.2 (H) 0 - 5.6 %      Comment:      Normal <5.7% Prediabetes 5.7-6.4%  Diabetes 6.5% or higher - adopted from ADA   consensus guidelines.     Lipid panel reflex to direct LDL Fasting   Result Value Ref Range    Cholesterol 150 <200 mg/dL    Triglycerides 74 <150 mg/dL    HDL Cholesterol 53 >39 mg/dL    LDL Cholesterol Calculated 82 <100 mg/dL      Comment:      Desirable:       <100 mg/dl    Non HDL Cholesterol 97 <130 mg/dL   Albumin Random Urine Quantitative with Creat Ratio   Result Value Ref Range    Creatinine Urine 104 mg/dL    Albumin Urine mg/L <5 mg/L    Albumin Urine mg/g Cr Unable to calculate due to low value 0 - 17 mg/g Cr   Basic metabolic panel  (Ca, Cl, CO2, Creat, Gluc, K, Na, BUN)   Result Value Ref Range    Sodium 138 133 - 144 mmol/L    Potassium 3.9 3.4 - 5.3 mmol/L    Chloride 106 94 - 109 mmol/L    Carbon Dioxide 28 20 - 32 mmol/L    Anion Gap 4 3 - 14 mmol/L    Glucose 94 70 - 99 mg/dL    Urea Nitrogen 12 7 - 30 mg/dL    Creatinine 1.07 0.66 - 1.25 mg/dL    GFR Estimate 69 >60 mL/min/[1.73_m2]      Comment:      Non  GFR Calc  Starting 12/18/2018, serum creatinine based estimated GFR (eGFR) will be   calculated using the Chronic Kidney Disease Epidemiology Collaboration   (CKD-EPI) equation.      GFR Estimate If Black 80 >60 mL/min/[1.73_m2]      Comment:       GFR Calc  Starting 12/18/2018, serum creatinine based estimated GFR (eGFR) will be   calculated using the Chronic Kidney Disease Epidemiology Collaboration   (CKD-EPI) equation.      Calcium 9.2 8.5 - 10.1  mg/dL

## 2021-03-09 LAB
ANION GAP SERPL CALCULATED.3IONS-SCNC: 4 MMOL/L (ref 3–14)
BUN SERPL-MCNC: 12 MG/DL (ref 7–30)
CALCIUM SERPL-MCNC: 9.2 MG/DL (ref 8.5–10.1)
CHLORIDE SERPL-SCNC: 106 MMOL/L (ref 94–109)
CHOLEST SERPL-MCNC: 150 MG/DL
CO2 SERPL-SCNC: 28 MMOL/L (ref 20–32)
CREAT SERPL-MCNC: 1.07 MG/DL (ref 0.66–1.25)
CREAT UR-MCNC: 104 MG/DL
GFR SERPL CREATININE-BSD FRML MDRD: 69 ML/MIN/{1.73_M2}
GLUCOSE SERPL-MCNC: 94 MG/DL (ref 70–99)
HDLC SERPL-MCNC: 53 MG/DL
LDLC SERPL CALC-MCNC: 82 MG/DL
MICROALBUMIN UR-MCNC: <5 MG/L
MICROALBUMIN/CREAT UR: NORMAL MG/G CR (ref 0–17)
NONHDLC SERPL-MCNC: 97 MG/DL
POTASSIUM SERPL-SCNC: 3.9 MMOL/L (ref 3.4–5.3)
SODIUM SERPL-SCNC: 138 MMOL/L (ref 133–144)
TRIGL SERPL-MCNC: 74 MG/DL

## 2021-03-17 DIAGNOSIS — E11.9 TYPE 2 DIABETES MELLITUS WITHOUT COMPLICATION, WITHOUT LONG-TERM CURRENT USE OF INSULIN (H): ICD-10-CM

## 2021-03-21 NOTE — TELEPHONE ENCOUNTER
Duplicate     Video Education Report - ICR/CR    Name:  Uri Adam     Date:  12/26/2018  MRN: [id number]     Session #:    Session Length: 40 min    Recommended Videos        []01 [employer] - Program Overview   34:22    []02 Overview of Pritikin Eating Plan   34:10    []03 Becoming a Pritikin    33:08     []04 Diseases of Our Time - Part 1   34:22    []05 Calorie Density     33:39   []06 Label Reading - Part 1    32:15   []07 Move it      32.54   []08 Healthy Minds, Bodies, Hearts   32:14   []09 Dining Out - Part 1    32:28   [x]10 Heart Disease Risk Reduction   51:20   []92 Metabolic Syndrome and Belly Fat  31:52   []12 Facts on Fat     35:29   []13 Diseases of Our Time - Part 2   33:07   []14 Biology of Weight Control   32:36   []15 Biomechanical Limitations   35:20   []16 Nutrition Action Plan    34:23      Comments:  Video completed,

## 2021-05-03 DIAGNOSIS — M75.42 IMPINGEMENT SYNDROME OF BOTH SHOULDERS: ICD-10-CM

## 2021-05-03 DIAGNOSIS — M75.41 IMPINGEMENT SYNDROME OF BOTH SHOULDERS: ICD-10-CM

## 2021-05-03 PROBLEM — F11.90 CHRONIC, CONTINUOUS USE OF OPIOIDS: Status: ACTIVE | Noted: 2021-05-03

## 2021-05-04 RX ORDER — TRAMADOL HYDROCHLORIDE 50 MG/1
TABLET ORAL
Qty: 90 TABLET | Refills: 0 | Status: SHIPPED | OUTPATIENT
Start: 2021-05-04 | End: 2021-07-20

## 2021-05-04 NOTE — TELEPHONE ENCOUNTER
Please let the patient know he needs to be seen for a chronic pain visit to update his pain contract.  I have sent a 1 month refill    Nicole Magdaleno DO

## 2021-05-04 NOTE — TELEPHONE ENCOUNTER
Routing refill request to provider for review/approval because:  Drug not on the [facility] refill protocol     Noelle Dejesus RN

## 2021-05-05 NOTE — TELEPHONE ENCOUNTER
Called and spoke with patient- he states understanding and will have to look at his schedule first- he says he'll call the clinic back to schedule appt before his next refill is due.    Zoe De La O MA

## 2021-07-20 DIAGNOSIS — M75.42 IMPINGEMENT SYNDROME OF BOTH SHOULDERS: ICD-10-CM

## 2021-07-20 DIAGNOSIS — M75.41 IMPINGEMENT SYNDROME OF BOTH SHOULDERS: ICD-10-CM

## 2021-07-20 RX ORDER — TRAMADOL HYDROCHLORIDE 50 MG/1
TABLET ORAL
Qty: 90 TABLET | Refills: 0 | Status: SHIPPED | OUTPATIENT
Start: 2021-07-20 | End: 2021-10-05

## 2021-07-23 ENCOUNTER — OFFICE VISIT (OUTPATIENT)
Dept: FAMILY MEDICINE | Facility: CLINIC | Age: 71
End: 2021-07-23
Payer: COMMERCIAL

## 2021-07-23 VITALS
HEIGHT: 65 IN | WEIGHT: 132.8 LBS | BODY MASS INDEX: 22.13 KG/M2 | HEART RATE: 94 BPM | OXYGEN SATURATION: 100 % | TEMPERATURE: 98.8 F | DIASTOLIC BLOOD PRESSURE: 76 MMHG | SYSTOLIC BLOOD PRESSURE: 130 MMHG | RESPIRATION RATE: 16 BRPM

## 2021-07-23 DIAGNOSIS — R22.1 NECK MASS: ICD-10-CM

## 2021-07-23 DIAGNOSIS — M75.41 IMPINGEMENT SYNDROME OF BOTH SHOULDERS: ICD-10-CM

## 2021-07-23 DIAGNOSIS — E11.9 TYPE 2 DIABETES MELLITUS WITHOUT COMPLICATION, WITHOUT LONG-TERM CURRENT USE OF INSULIN (H): Primary | ICD-10-CM

## 2021-07-23 DIAGNOSIS — E78.5 HYPERLIPIDEMIA LDL GOAL <70: ICD-10-CM

## 2021-07-23 DIAGNOSIS — F11.90 CHRONIC, CONTINUOUS USE OF OPIOIDS: ICD-10-CM

## 2021-07-23 DIAGNOSIS — I10 BENIGN ESSENTIAL HYPERTENSION: ICD-10-CM

## 2021-07-23 DIAGNOSIS — M75.42 IMPINGEMENT SYNDROME OF BOTH SHOULDERS: ICD-10-CM

## 2021-07-23 LAB
AMPHETAMINES UR QL: NOT DETECTED
BARBITURATES UR QL SCN: NOT DETECTED
BENZODIAZ UR QL SCN: NOT DETECTED
BUPRENORPHINE UR QL: NOT DETECTED
CANNABINOIDS UR QL: NOT DETECTED
COCAINE UR QL SCN: NOT DETECTED
D-METHAMPHET UR QL: NOT DETECTED
HBA1C MFR BLD: 6.8 % (ref 0–5.6)
METHADONE UR QL SCN: NOT DETECTED
OPIATES UR QL SCN: NOT DETECTED
OXYCODONE UR QL SCN: NOT DETECTED
PCP UR QL SCN: NOT DETECTED
PROPOXYPH UR QL: NOT DETECTED
TRICYCLICS UR QL SCN: NOT DETECTED

## 2021-07-23 PROCEDURE — 36415 COLL VENOUS BLD VENIPUNCTURE: CPT | Performed by: FAMILY MEDICINE

## 2021-07-23 PROCEDURE — 83036 HEMOGLOBIN GLYCOSYLATED A1C: CPT | Performed by: FAMILY MEDICINE

## 2021-07-23 PROCEDURE — 99215 OFFICE O/P EST HI 40 MIN: CPT | Performed by: FAMILY MEDICINE

## 2021-07-23 PROCEDURE — 96127 BRIEF EMOTIONAL/BEHAV ASSMT: CPT | Performed by: FAMILY MEDICINE

## 2021-07-23 PROCEDURE — 80306 DRUG TEST PRSMV INSTRMNT: CPT | Performed by: FAMILY MEDICINE

## 2021-07-23 RX ORDER — TRAMADOL HYDROCHLORIDE 50 MG/1
TABLET ORAL
Qty: 90 TABLET | Refills: 0 | Status: CANCELLED | OUTPATIENT
Start: 2021-07-23

## 2021-07-23 RX ORDER — LISINOPRIL 20 MG/1
20 TABLET ORAL DAILY
Qty: 90 TABLET | Refills: 1 | Status: SHIPPED | OUTPATIENT
Start: 2021-07-23 | End: 2022-02-14

## 2021-07-23 RX ORDER — ATORVASTATIN CALCIUM 10 MG/1
10 TABLET, FILM COATED ORAL DAILY
Qty: 90 TABLET | Refills: 3 | Status: SHIPPED | OUTPATIENT
Start: 2021-07-23 | End: 2023-03-21

## 2021-07-23 ASSESSMENT — ANXIETY QUESTIONNAIRES
1. FEELING NERVOUS, ANXIOUS, OR ON EDGE: NOT AT ALL
IF YOU CHECKED OFF ANY PROBLEMS ON THIS QUESTIONNAIRE, HOW DIFFICULT HAVE THESE PROBLEMS MADE IT FOR YOU TO DO YOUR WORK, TAKE CARE OF THINGS AT HOME, OR GET ALONG WITH OTHER PEOPLE: NOT DIFFICULT AT ALL
5. BEING SO RESTLESS THAT IT IS HARD TO SIT STILL: NOT AT ALL
3. WORRYING TOO MUCH ABOUT DIFFERENT THINGS: NOT AT ALL
2. NOT BEING ABLE TO STOP OR CONTROL WORRYING: NOT AT ALL
6. BECOMING EASILY ANNOYED OR IRRITABLE: NOT AT ALL
7. FEELING AFRAID AS IF SOMETHING AWFUL MIGHT HAPPEN: NOT AT ALL
GAD7 TOTAL SCORE: 0

## 2021-07-23 ASSESSMENT — PATIENT HEALTH QUESTIONNAIRE - PHQ9
SUM OF ALL RESPONSES TO PHQ QUESTIONS 1-9: 0
5. POOR APPETITE OR OVEREATING: NOT AT ALL

## 2021-07-23 ASSESSMENT — MIFFLIN-ST. JEOR: SCORE: 1276.32

## 2021-07-23 ASSESSMENT — PAIN SCALES - GENERAL: PAINLEVEL: MODERATE PAIN (5)

## 2021-07-23 NOTE — LETTER
July 26, 2021      Devon Watts  [address] [address]        Dear ,    We are writing to inform you of your test results.    {results letter list:[id number]}    Resulted Orders   HEMOGLOBIN A1C   Result Value Ref Range    Hemoglobin A1C 6.8 (H) 0.0 - 5.6 %      Comment:      Normal <5.7%   Prediabetes 5.7-6.4%    Diabetes 6.5% or higher     Note: Adopted from ADA consensus guidelines.   Drug Abuse Screen Panel 13, Urine (Pain Care Package) - lab collect   Result Value Ref Range    Cannabinoids (41-ozf-8-carboxy-9-THC) Not Detected Not Detected, Indeterminate      Comment:      Cutoff for a negative cannabinoid is 50 ng/mL or less.    Phencyclidine Not Detected Not Detected, Indeterminate      Comment:      Cutoff for a negative PCP is 25 ng/mL or less.    Cocaine (Benzoylecgonine) Not Detected Not Detected, Indeterminate      Comment:      Cutoff for a negative cocaine is 150 ng/ml or less.    Methamphetamine (d-Methamphetamine) Not Detected Not Detected, Indeterminate      Comment:      Cutoff for a negative methamphetamine is 500 ng/ml or less.    Opiates (Morphine) Not Detected Not Detected, Indeterminate      Comment:      Cutoff for a negative opiate is 100 ng/ml or less.    Amphetamine (d-Amphetamine) Not Detected Not Detected, Indeterminate      Comment:      Cutoff for a negative amphetamine is 500 ng/mL or less.    Benzodiazepines (Nordiazepam) Not Detected Not Detected, Indeterminate      Comment:      Cutoff for a negative benzodiazepine is 150 ng/ml or less.    Tricyclic Antidepressants (Desipramine) Not Detected Not Detected, Indeterminate      Comment:      Cutoff for a negative tricyclic antidepressant is 300 ng/ml or less.    Methadone Not Detected Not Detected, Indeterminate      Comment:      Cutoff for a negative methadone is 200 ng/ml or less.    Barbiturates (Butalbital) Not Detected Not Detected, Indeterminate      Comment:      Cutoff for a negative barbituate is  200 ng/ml or less.    Oxycodone Not Detected Not Detected, Indeterminate      Comment:      Cutoff for a negative oxycodone is 100 ng/mL or less.    Propoxyphene (Norpropoxyphene) Not Detected Not Detected, Indeterminate      Comment:      Cutoff for a negative propoxyphene is 300 ng/ml or less.    Buprenorphine Not Detected Not Detected, Indeterminate      Comment:      Cutoff for a negative buprenorphine is 10 ng/ml or less.       If you have any questions or concerns, please call the clinic at the number listed above.       Sincerely,      Nicole Magdaleno, DO

## 2021-07-23 NOTE — LETTER
July 26, 2021      Devon Watts  [address] [address]        Dear Devon,     Your recent lab results were within normal limits. A copy of those results are included with this letter.        Sincerely,        Nicole Magdaleno, DO    Results for orders placed or performed in visit on 07/23/21   HEMOGLOBIN A1C     Status: Abnormal   Result Value Ref Range    Hemoglobin A1C 6.8 (H) 0.0 - 5.6 %   Drug Abuse Screen Panel 13, Urine (Pain Care Package) - lab collect     Status: Normal   Result Value Ref Range    Cannabinoids (29-svc-7-carboxy-9-THC) Not Detected Not Detected, Indeterminate    Phencyclidine Not Detected Not Detected, Indeterminate    Cocaine (Benzoylecgonine) Not Detected Not Detected, Indeterminate    Methamphetamine (d-Methamphetamine) Not Detected Not Detected, Indeterminate    Opiates (Morphine) Not Detected Not Detected, Indeterminate    Amphetamine (d-Amphetamine) Not Detected Not Detected, Indeterminate    Benzodiazepines (Nordiazepam) Not Detected Not Detected, Indeterminate    Tricyclic Antidepressants (Desipramine) Not Detected Not Detected, Indeterminate    Methadone Not Detected Not Detected, Indeterminate    Barbiturates (Butalbital) Not Detected Not Detected, Indeterminate    Oxycodone Not Detected Not Detected, Indeterminate    Propoxyphene (Norpropoxyphene) Not Detected Not Detected, Indeterminate    Buprenorphine Not Detected Not Detected, Indeterminate

## 2021-07-23 NOTE — LETTER
[facility]  -- Controlled Medication Agreement    7/23/2021   Devon Solorzanodylan   1950   [phone]       I understand that my provider is prescribing controlled medication (i.e., opioids, tranquilizers, barbiturates) to assist me in managing my chronic pain that has not responded to other treatments.  These medications are intended to decrease pain in order to improve function and/or ability to work.  The risks, benefits, and side effects or these medications have been explained to me and I agree to the following conditions for this type of treatment.    1. I will participate in other treatments (i.e., physical therapy, behavioral therapy, groups,) that my provider recommends.  I will be ready to taper or discontinue medications as other reasonable and effective treatments become available.  I understand I may be expected to see a health psychologist and a physical therapist at the discretion of my physician for ongoing functional assessment.  I will follow through with any recommendations made at this visit.    2. I will take my medications exactly as prescribed and will not change the medication dosage or schedule without my provider s approval.  Refills will not be given if I  run out early .  3. I will keep all regular appointments at the clinic (this includes nurse appointments and appointments with PT or behavioral medicine).  If I have three or more missed or cancelled appointments my medications may be discontinued.  4. I will not request or accept prescriptions for controlled substances from other physicians or individuals for my chronic pain condition.  If I develop another condition that requires the prescription of a controlled medication or if I am hospitalized for any reason, I will inform the clinic within one business day of receiving any treatment or medications.  5. I will designate one pharmacy where all of my prescriptions will be filled.  6. I will bring in the containers  of all medications prescribed each time I see my provider or a nurse even if there is no medication remaining.  This must be the original container from the pharmacy.  7. Refills of controlled medications will be made only during regular office hours, during a scheduled appointment with my provider or nurse.   8. I am responsible for my prescriptions.  If the medication is lost or stolen, I understand it will not be replaced.  9. I agree to abstain from all illegal and recreational drugs and will provide urine or blood specimens to monitor my compliance.  10. I will notify my nurse or provider immediately if I become pregnant.  11. I understand that controlled medications can affect my thinking and judgment and may interfere with my ability to drive.  I will not drive if I have this concern and will not drive if any dosage adjustments are made until my body has adjusted.        I understand that if I violate any of the above conditions my prescription medications and/or treatment may be terminated.  If the violation includes obtaining any controlled substances from other healthcare providers or individuals a report may be made to my physician, pharmacy, and other authorities including the police.    I have read this agreement and it has been explained to me.  I fully understand the consequences of violating this agreement.        _________________                             ___7/23/21________                _________________       Patient Signature                                Date                              Witness      ___  ______________  Nicole Magdaleno, DO

## 2021-07-23 NOTE — PROGRESS NOTES
Assessment & Plan     Type 2 diabetes mellitus without complication, without long-term current use of insulin (H)  The current medical regimen is effective;  continue present plan and medications.  No refills needed at this time    - OPTOMETRY REFERRAL; Future    Hyperlipidemia LDL goal <70  The current medical regimen is effective;  continue present plan and medications.    - atorvastatin (LIPITOR) 10 MG tablet; Take 1 tablet (10 mg) by mouth daily    Benign essential hypertension  The current medical regimen is effective;  continue present plan and medications.    - lisinopril (ZESTRIL) 20 MG tablet; Take 1 tablet (20 mg) by mouth daily    Impingement syndrome of both shoulders  The patient has been stable on tramadol for this for many years prior to being in my care.  He has done narcotics contract today.  - Drug Abuse Screen Panel 13, Urine (Pain Care Package) - lab collect; Future    Chronic, continuous use of opioids  Urine drug screen and narcotics contract on    Neck mass  Likely infected salivary gland that is resolved.  Patient would like to get this confirmed by ear nose and throat  - Otolaryngology Referral; Future    40 minutes spent on the date of the encounter doing chart review, review of test results, interpretation of tests, patient visit and documentation         Return in about 6 months (around 1/23/2022) for diabetes.    Nicole Magdaleno DO  [facility]   Devon is a 70 year old who presents for the following health issues     HPI     Diabetes Follow-up    How often are you checking your blood sugar? One time daily  What time of day are you checking your blood sugars (select all that apply)?  Before and after meals  Have you had any blood sugars above 200?  No  Have you had any blood sugars below 70?  No    What symptoms do you notice when your blood sugar is low?  None    What concerns do you have today about your diabetes? None     Do you have any of  these symptoms? (Select all that apply)  No numbness or tingling in feet.  No redness, sores or blisters on feet.  No complaints of excessive thirst.  No reports of blurry vision.  No significant changes to weight.    Have you had a diabetic eye exam in the last 12 months? No     Metformin 1000 mg twice daily  Lab Results   Component Value Date    A1C 6.8 07/23/2021    A1C 7.2 03/08/2021    A1C 7.2 08/04/2020    A1C 7.0 11/19/2019    A1C 6.2 01/03/2019    A1C 5.9 06/04/2018             Hyperlipidemia Follow-Up      Are you regularly taking any medication or supplement to lower your cholesterol?   Yes- atorvastatin    Are you having muscle aches or other side effects that you think could be caused by your cholesterol lowering medication?  No   LDL Cholesterol Calculated   Date Value Ref Range Status   03/08/2021 82 <100 mg/dL Final     Comment:     Desirable:       <100 mg/dl           Hypertension Follow-up      Do you check your blood pressure regularly outside of the clinic? Yes at work    Are you following a low salt diet? Yes    Are your blood pressures ever more than 140 on the top number (systolic) OR more   than 90 on the bottom number (diastolic), for example 140/90? No   Lisinopril 20 mg daily    BP Readings from Last 2 Encounters:   07/23/21 130/76   03/08/21 138/80     Hemoglobin A1C (%)   Date Value   07/23/2021 6.8 (H)   03/08/2021 7.2 (H)   08/04/2020 7.2 (H)     LDL Cholesterol Calculated (mg/dL)   Date Value   03/08/2021 82   11/19/2019 80       Chronic Pain Follow-Up    Where in your body do you have pain? Bilateral shoulders  How has your pain affected your ability to work? 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  Nothing new  How well are you sleeping? Good  How has your mood been since your last visit? About the same  Have you had a significant life event? No  Other aggravating  "factors: prolonged sitting, prolonged standing and Prolonged laying down  Taking medication as directed? Yes  He is taking it once a day and sometimes takes half     PHQ-9 SCORE 7/23/2021   PHQ-9 Total Score 0   Some encounter information is confidential and restricted. Go to Review Flowsheets activity to see all data.     DAVY-7 SCORE 7/23/2021   Total Score 0   Some encounter information is confidential and restricted. Go to Review Flowsheets activity to see all data.     No flowsheet data found.  Encounter-Level CSA:    There are no encounter-level csa.     Patient-Level CSA:    Controlled Substance Agreement - Opioid - Scan on 1/3/2019  3:13 PM       The patient states about a year or so ago when he was visiting [place] he got swelling and pain under his jaw .  He stated he saw  And was given antibiotic for this.  He had been told to follow-up with ear nose and throat and had not gotten around to it and then the pandemic occurred.  He is requesting to see ear nose and throat now.      How many servings of fruits and vegetables do you eat daily?  4 or more    On average, how many sweetened beverages do you drink each day (Examples: soda, juice, sweet tea, etc.  Do NOT count diet or artificially sweetened beverages)?   0-1    How many days per week do you exercise enough to make your heart beat faster? 5    How many minutes a day do you exercise enough to make your heart beat faster? 20 - 29    How many days per week do you miss taking your medication? 0    Review of Systems   Constitutional, HEENT, cardiovascular, pulmonary, gi and gu systems are negative, except as otherwise noted.      Objective    /76 (BP Location: Right arm, Patient Position: Sitting, Cuff Size: Adult Regular)   Pulse 94   Temp 98.8  F (37.1  C) (Oral)   Resp 16   Ht 1.638 m (5' 4.5\")   Wt 60.2 kg (132 lb 12.8 oz)   SpO2 100%   BMI 22.44 kg/m    Body mass index is 22.44 kg/m .  Physical Exam   GENERAL: healthy, alert and no " distress  HENT: ear canals and TM's normal, nose and mouth without ulcers or lesions  NECK: no adenopathy, no asymmetry, masses, or scars, thyroid normal to palpation and slightly enlarged submandibular glands bilaterally  RESP: lungs clear to auscultation - no rales, rhonchi or wheezes  CV: regular rate and rhythm, normal S1 S2, no S3 or S4, no murmur, click or rub, no peripheral edema and peripheral pulses strong  MS: no gross musculoskeletal defects noted, no edema  PSYCH: mentation appears normal, affect normal/bright    Results for orders placed or performed in visit on 07/23/21 (from the past 24 hour(s))   HEMOGLOBIN A1C   Result Value Ref Range    Hemoglobin A1C 6.8 (H) 0.0 - 5.6 %

## 2021-07-24 ASSESSMENT — ANXIETY QUESTIONNAIRES: GAD7 TOTAL SCORE: 0

## 2021-09-11 ENCOUNTER — TRANSFERRED RECORDS (OUTPATIENT)
Dept: HEALTH INFORMATION MANAGEMENT | Facility: CLINIC | Age: 71
End: 2021-09-11

## 2021-09-11 LAB — RETINOPATHY: POSITIVE

## 2021-09-13 NOTE — PROGRESS NOTES
History of Present Illness - Devon Watts is a very pleasant 70 year old male here to see me for the first time at the consult of Nicole Magdaleno.    He has had recurrent issues with swelling in the neck, diagnosed as sialoadenitis starting in 2019. He was in [place] visiting family, and towards the end of the trip he noticed a lump under the back of the LEFT jaw.  He was seen over there, and was treated with antibiotics.  He came back to the [place] and was seen in urgent care.      Past Medical History -   Patient Active Problem List   Diagnosis     Type 2 diabetes mellitus without complication (H)     Hyperlipidemia with target LDL less than 100     Benign essential hypertension     Impingement syndrome of both shoulders     CKD (chronic kidney disease) stage 3, GFR 30-59 ml/min     Chronic, continuous use of opioids       Current Medications -   Current Outpatient Medications:      Acetaminophen (TYLENOL PO), , Disp: , Rfl:      aspirin 81 MG tablet, Take 81 mg by mouth daily, Disp: , Rfl:      atorvastatin (LIPITOR) 10 MG tablet, Take 1 tablet (10 mg) by mouth daily, Disp: 90 tablet, Rfl: 3     lidocaine (LIDODERM) 5 % patch, Place 1 patch onto the skin every 24 hours To prevent lidocaine toxicity, patient should be patch free for 12 hrs daily., Disp: 15 patch, Rfl: 0     lisinopril (ZESTRIL) 20 MG tablet, Take 1 tablet (20 mg) by mouth daily, Disp: 90 tablet, Rfl: 1     metFORMIN (GLUCOPHAGE) 1000 MG tablet, Take 1 tablet (1,000 mg) by mouth 2 times daily (with meals) Do not refill until contacted by the patient, Disp: 180 tablet, Rfl: 1     traMADol (ULTRAM) 50 MG tablet, TAKE 1 TABLET BY MOUTH EVERY 6 HOURS AS NEEDED FOR PAIN, Disp: 90 tablet, Rfl: 0    Allergies - No Known Allergies    Social History -   Social History     Socioeconomic History     Marital status:      Spouse name: Not on file     Number of children: Not on file     Years of education: Not on file     Highest education level: Not on file    Occupational History     Not on file   Tobacco Use     Smoking status: Never Smoker     Smokeless tobacco: Never Used   Vaping Use     Vaping Use: Never used   Substance and Sexual Activity     Alcohol use: No     Drug use: No     Sexual activity: Yes     Partners: Female   Other Topics Concern     Parent/sibling w/ CABG, MI or angioplasty before 65F 55M? No   Social History Narrative     Not on file     Social Determinants of Health     Financial Resource Strain:      Difficulty of Paying Living Expenses:    Food Insecurity:      Worried About Running Out of Food in the Last Year:      Ran Out of Food in the Last Year:    Transportation Needs:      Lack of Transportation (Medical):      Lack of Transportation (Non-Medical):    Physical Activity:      Days of Exercise per Week:      Minutes of Exercise per Session:    Stress:      Feeling of Stress :    Social Connections:      Frequency of Communication with Friends and Family:      Frequency of Social Gatherings with Friends and Family:      Attends Adventism Services:      Active Member of Clubs or Organizations:      Attends Club or Organization Meetings:      Marital Status:    Intimate Partner Violence:      Fear of Current or Ex-Partner:      Emotionally Abused:      Physically Abused:      Sexually Abused:        Family History -   Family History   Problem Relation Age of Onset     Hypertension Son      Diabetes Son      Breast Cancer No family hx of      Colon Cancer No family hx of      Prostate Cancer No family hx of      Other Cancer No family hx of        Review of Systems - As per HPI and PMHx, otherwise 10+ system review of the head and neck, and general constitution is negative.    Physical Exam  BP (!) 164/74 (BP Location: Right arm, Patient Position: Sitting, Cuff Size: Adult Regular)   Pulse 90   Wt 61.2 kg (135 lb)   SpO2 100%   BMI 22.81 kg/m      General - The patient is well nourished and well developed, and appears to have good  nutritional status.  Alert and oriented to person and place, answers questions and cooperates with examination appropriately.   Head and Face - Normocephalic and atraumatic, with no gross asymmetry noted of the contour of the facial features.  The facial nerve is intact, with strong symmetric movements.  Voice and Breathing - The patient was breathing comfortably without the use of accessory muscles. There was no wheezing, stridor, or stertor.  The patients voice was clear and strong, and had appropriate pitch and quality.  Ears - The tympanic membranes are normal in appearance, bony landmarks are intact.  No retraction, perforation, or masses.  No fluid or purulence was seen in the external canal or the middle ear. No evidence of infection of the middle ear or external canal, cerumen was normal in appearance.  Eyes - Extraocular movements intact, and the pupils were reactive to light.  Sclera were not icteric or injected, conjunctiva were pink and moist.  Mouth - Examination of the oral cavity showed pink, healthy oral mucosa. No lesions or ulcerations noted.  The tongue was mobile and midline, and the dentition were in good condition.    Throat - The walls of the oropharynx were smooth, pink, moist, symmetric, and had no lesions or ulcerations.  The tonsillar pillars and soft palate were symmetric.  The uvula was midline on elevation.    Neck - Normal midline excursion of the laryngotracheal complex during swallowing.  Full range of motion on passive movement.  Palpation of the occipital, submental, submandibular, internal jugular chain, and supraclavicular nodes did not demonstrate any abnormal lymph nodes or masses.  The carotid pulse was palpable bilaterally.  Palpation of the thyroid was soft and smooth, with no nodules or goiter appreciated.  The trachea was mobile and midline.  Nose - External contour is symmetric, no gross deflection or scars.  Nasal mucosa is pink and moist with no abnormal mucus.  The  septum was midline and non-obstructive, turbinates of normal size and position.  No polyps, masses, or purulence noted on examination.      A/P - Devon Watts is a 70 year old male  (K11.20) Sialoadenitis  (primary encounter diagnosis)  (R22.1) Neck mass    The history and exam are consistent with LEFT submandibular sialoadenitis.    I will get imaging to see if we can see a stone, or other mass effect causing this, and will call him with results.

## 2021-09-16 ENCOUNTER — OFFICE VISIT (OUTPATIENT)
Dept: OTOLARYNGOLOGY | Facility: CLINIC | Age: 71
End: 2021-09-16
Attending: FAMILY MEDICINE
Payer: COMMERCIAL

## 2021-09-16 VITALS
OXYGEN SATURATION: 100 % | HEART RATE: 90 BPM | WEIGHT: 135 LBS | DIASTOLIC BLOOD PRESSURE: 74 MMHG | BODY MASS INDEX: 22.81 KG/M2 | SYSTOLIC BLOOD PRESSURE: 164 MMHG

## 2021-09-16 DIAGNOSIS — K11.20 SIALOADENITIS: Primary | ICD-10-CM

## 2021-09-16 DIAGNOSIS — R22.1 NECK MASS: ICD-10-CM

## 2021-09-16 PROCEDURE — 99203 OFFICE O/P NEW LOW 30 MIN: CPT | Performed by: OTOLARYNGOLOGY

## 2021-09-16 NOTE — NURSING NOTE
"Chief Complaint   Patient presents with     Mass     neck mass        Vitals:    09/16/21 1425   BP: (!) 164/74   BP Location: Right arm   Patient Position: Sitting   Cuff Size: Adult Regular   Pulse: 90   SpO2: 100%   Weight: 61.2 kg (135 lb)     Wt Readings from Last 1 Encounters:   09/16/21 61.2 kg (135 lb)     Ht Readings from Last 1 Encounters:   07/23/21 1.638 m (5' 4.5\")       Dai Gottlieb [facility], 9/16/2021 2:25 PM    "

## 2021-09-16 NOTE — LETTER
9/16/2021         RE: Devon Watts  [address] [address]        Dear Colleague,    Thank you for referring your patient, Devon Watts, to the [facility]. Please see a copy of my visit note below.    History of Present Illness - Devon Watts is a very pleasant 70 year old male here to see me for the first time at the consult of Nicole Magdaleno.    He has had recurrent issues with swelling in the neck, diagnosed as sialoadenitis starting in 2019. He was in [place] visiting family, and towards the end of the trip he noticed a lump under the back of the LEFT jaw.  He was seen over there, and was treated with antibiotics.  He came back to the  and was seen in urgent care.      Past Medical History -   Patient Active Problem List   Diagnosis     Type 2 diabetes mellitus without complication (H)     Hyperlipidemia with target LDL less than 100     Benign essential hypertension     Impingement syndrome of both shoulders     CKD (chronic kidney disease) stage 3, GFR 30-59 ml/min     Chronic, continuous use of opioids       Current Medications -   Current Outpatient Medications:      Acetaminophen (TYLENOL PO), , Disp: , Rfl:      aspirin 81 MG tablet, Take 81 mg by mouth daily, Disp: , Rfl:      atorvastatin (LIPITOR) 10 MG tablet, Take 1 tablet (10 mg) by mouth daily, Disp: 90 tablet, Rfl: 3     lidocaine (LIDODERM) 5 % patch, Place 1 patch onto the skin every 24 hours To prevent lidocaine toxicity, patient should be patch free for 12 hrs daily., Disp: 15 patch, Rfl: 0     lisinopril (ZESTRIL) 20 MG tablet, Take 1 tablet (20 mg) by mouth daily, Disp: 90 tablet, Rfl: 1     metFORMIN (GLUCOPHAGE) 1000 MG tablet, Take 1 tablet (1,000 mg) by mouth 2 times daily (with meals) Do not refill until contacted by the patient, Disp: 180 tablet, Rfl: 1     traMADol (ULTRAM) 50 MG tablet, TAKE 1 TABLET BY MOUTH EVERY 6 HOURS AS NEEDED FOR PAIN, Disp: 90 tablet, Rfl: 0    Allergies  - No Known Allergies    Social History -   Social History     Socioeconomic History     Marital status:      Spouse name: Not on file     Number of children: Not on file     Years of education: Not on file     Highest education level: Not on file   Occupational History     Not on file   Tobacco Use     Smoking status: Never Smoker     Smokeless tobacco: Never Used   Vaping Use     Vaping Use: Never used   Substance and Sexual Activity     Alcohol use: No     Drug use: No     Sexual activity: Yes     Partners: Female   Other Topics Concern     Parent/sibling w/ CABG, MI or angioplasty before 65F 55M? No   Social History Narrative     Not on file     Social Determinants of Health     Financial Resource Strain:      Difficulty of Paying Living Expenses:    Food Insecurity:      Worried About Running Out of Food in the Last Year:      Ran Out of Food in the Last Year:    Transportation Needs:      Lack of Transportation (Medical):      Lack of Transportation (Non-Medical):    Physical Activity:      Days of Exercise per Week:      Minutes of Exercise per Session:    Stress:      Feeling of Stress :    Social Connections:      Frequency of Communication with Friends and Family:      Frequency of Social Gatherings with Friends and Family:      Attends Rastafarian Services:      Active Member of Clubs or Organizations:      Attends Club or Organization Meetings:      Marital Status:    Intimate Partner Violence:      Fear of Current or Ex-Partner:      Emotionally Abused:      Physically Abused:      Sexually Abused:        Family History -   Family History   Problem Relation Age of Onset     Hypertension Son      Diabetes Son      Breast Cancer No family hx of      Colon Cancer No family hx of      Prostate Cancer No family hx of      Other Cancer No family hx of        Review of Systems - As per HPI and PMHx, otherwise 10+ system review of the head and neck, and general constitution is negative.    Physical Exam  BP  (!) 164/74 (BP Location: Right arm, Patient Position: Sitting, Cuff Size: Adult Regular)   Pulse 90   Wt 61.2 kg (135 lb)   SpO2 100%   BMI 22.81 kg/m      General - The patient is well nourished and well developed, and appears to have good nutritional status.  Alert and oriented to person and place, answers questions and cooperates with examination appropriately.   Head and Face - Normocephalic and atraumatic, with no gross asymmetry noted of the contour of the facial features.  The facial nerve is intact, with strong symmetric movements.  Voice and Breathing - The patient was breathing comfortably without the use of accessory muscles. There was no wheezing, stridor, or stertor.  The patients voice was clear and strong, and had appropriate pitch and quality.  Ears - The tympanic membranes are normal in appearance, bony landmarks are intact.  No retraction, perforation, or masses.  No fluid or purulence was seen in the external canal or the middle ear. No evidence of infection of the middle ear or external canal, cerumen was normal in appearance.  Eyes - Extraocular movements intact, and the pupils were reactive to light.  Sclera were not icteric or injected, conjunctiva were pink and moist.  Mouth - Examination of the oral cavity showed pink, healthy oral mucosa. No lesions or ulcerations noted.  The tongue was mobile and midline, and the dentition were in good condition.    Throat - The walls of the oropharynx were smooth, pink, moist, symmetric, and had no lesions or ulcerations.  The tonsillar pillars and soft palate were symmetric.  The uvula was midline on elevation.    Neck - Normal midline excursion of the laryngotracheal complex during swallowing.  Full range of motion on passive movement.  Palpation of the occipital, submental, submandibular, internal jugular chain, and supraclavicular nodes did not demonstrate any abnormal lymph nodes or masses.  The carotid pulse was palpable bilaterally.  Palpation  of the thyroid was soft and smooth, with no nodules or goiter appreciated.  The trachea was mobile and midline.  Nose - External contour is symmetric, no gross deflection or scars.  Nasal mucosa is pink and moist with no abnormal mucus.  The septum was midline and non-obstructive, turbinates of normal size and position.  No polyps, masses, or purulence noted on examination.      A/P Florian Watts is a 70 year old male  (K11.20) Sialoadenitis  (primary encounter diagnosis)  (R22.1) Neck mass    The history and exam are consistent with LEFT submandibular sialoadenitis.    I will get imaging to see if we can see a stone, or other mass effect causing this, and will call him with results.        Again, thank you for allowing me to participate in the care of your patient.        Sincerely,        Morgan Cotto MD

## 2021-09-27 ENCOUNTER — TELEPHONE (OUTPATIENT)
Dept: FAMILY MEDICINE | Facility: CLINIC | Age: 71
End: 2021-09-27

## 2021-09-27 NOTE — TELEPHONE ENCOUNTER
"Patient states it was high, because he had to climb the stairs to get to ENT.  Writer tried to schedule a nurse only visit but patient refused, will call us to schedule.  States he's been taking his BP at home and it's been \"okay\"    Postponing this for two days to verify patient scheduled visit.     Henrietta Govea/  Ramez Valdez   "

## 2021-09-27 NOTE — TELEPHONE ENCOUNTER
Please call this patient and have him come in for a nurse only office visit to check his blood pressure.  His blood pressure was very high at the ear nose and throat doctor but had been normal at his last diabetic checkup.    Nicole Magdalneo DO

## 2021-09-29 NOTE — TELEPHONE ENCOUNTER
Routing to PCP as FYI.    Thank you,  Bernadette CHAUHAN  [facility]  Team Rekha Coordinator

## 2021-09-30 NOTE — TELEPHONE ENCOUNTER
If the patient could take his blood pressure and give it to us we could put it in his chart.    Nicole Magdaleno DO

## 2021-09-30 NOTE — TELEPHONE ENCOUNTER
Spoke to patient. He will keep track of his BP readings for the next few days and call back on Monday to update.    Thank you,  Bernadette CHAUHAN  [facility]  Team Rekha Coordinator

## 2021-10-05 DIAGNOSIS — M75.42 IMPINGEMENT SYNDROME OF BOTH SHOULDERS: ICD-10-CM

## 2021-10-05 DIAGNOSIS — M75.41 IMPINGEMENT SYNDROME OF BOTH SHOULDERS: ICD-10-CM

## 2021-10-05 RX ORDER — TRAMADOL HYDROCHLORIDE 50 MG/1
TABLET ORAL
Qty: 90 TABLET | Refills: 0 | Status: SHIPPED | OUTPATIENT
Start: 2021-10-05 | End: 2021-12-06

## 2021-10-05 NOTE — TELEPHONE ENCOUNTER
Routing refill request to provider for review/approval because:  Drug not on the [facility] refill protocol     Juan Manuel Samaniego RN, BSN, PHN  [facility]

## 2021-10-18 ENCOUNTER — TELEPHONE (OUTPATIENT)
Dept: FAMILY MEDICINE | Facility: CLINIC | Age: 71
End: 2021-10-18

## 2021-10-18 NOTE — TELEPHONE ENCOUNTER
Patient Quality Outreach      Summary:    Patient has the following on his problem list/HM:     Diabetes    Last A1C:  Lab Results   Component Value Date    A1C 6.8 07/23/2021    A1C 7.2 03/08/2021    A1C 7.2 08/04/2020       Last LDL:    Lab Results   Component Value Date    LDL 82 03/08/2021       Is the patient on a Statin? Yes          Is the patient on Aspirin? Yes    Medications     HMG CoA Reductase Inhibitors     atorvastatin (LIPITOR) 10 MG tablet       Salicylates     aspirin 81 MG tablet             Last three blood pressure readings:  BP Readings from Last 3 Encounters:   09/16/21 (!) 164/74   07/23/21 130/76   03/08/21 138/80            Tobacco Use      Smoking status: Never Smoker      Smokeless tobacco: Never Used          Hypertension   Last three blood pressure readings:  BP Readings from Last 3 Encounters:   09/16/21 (!) 164/74   07/23/21 130/76   03/08/21 138/80     Blood pressure: Failed    HTN Guidelines:  ? 139/89     Patient is due/failing the following:   Hypertension -  BP check    Type of outreach:    Sent letter.    Questions for provider review:    None                                                                                                                                       Zoe De La O MA

## 2021-10-18 NOTE — LETTER
October 18, 2021      Devon Watts  [address] [address]      Your healthcare team cares about your health. To provide you with the best care, we have reviewed your chart and based on our findings, we see that you are due to:     - HYPERTENSION FOLLOW UP: Blood pressure check with nurse only    If you have already completed these items, please contact the clinic via phone or Mychart so your care team can review and update your records.  Thank you for choosing [facility] for your healthcare needs. For any questions, concerns, or to schedule an appointment please contact the clinic.       Healthy Regards,      Your [facility] Care Team

## 2021-12-05 DIAGNOSIS — M75.41 IMPINGEMENT SYNDROME OF BOTH SHOULDERS: ICD-10-CM

## 2021-12-05 DIAGNOSIS — M75.42 IMPINGEMENT SYNDROME OF BOTH SHOULDERS: ICD-10-CM

## 2021-12-06 RX ORDER — TRAMADOL HYDROCHLORIDE 50 MG/1
TABLET ORAL
Qty: 90 TABLET | Refills: 0 | Status: SHIPPED | OUTPATIENT
Start: 2021-12-06 | End: 2022-02-01

## 2021-12-06 NOTE — TELEPHONE ENCOUNTER
Routing refill request to provider for review/approval because:  Drug not on the [facility] refill protocol     Lurdes Oconnor RN, BSN, PHN  [facility] [place]

## 2021-12-06 NOTE — TELEPHONE ENCOUNTER
Please let this patient know he needs an appointment.  I have done a refill of his tramadol but for further refills he needs to be seen.    Nicole Magdaleno DO

## 2021-12-07 NOTE — TELEPHONE ENCOUNTER
Called and spoke with patient but he will call back later to schedule an appt.    Rena See SINDY Ivory

## 2022-01-31 DIAGNOSIS — M75.41 IMPINGEMENT SYNDROME OF BOTH SHOULDERS: ICD-10-CM

## 2022-01-31 DIAGNOSIS — M75.42 IMPINGEMENT SYNDROME OF BOTH SHOULDERS: ICD-10-CM

## 2022-01-31 NOTE — TELEPHONE ENCOUNTER
Routing refill request to provider for review/approval because:  Drug not on the [facility] refill protocol           Pending Prescriptions:                       Disp   Refills    traMADol (ULTRAM) 50 MG tablet [Pharmacy M*90 tab*         Sig: TAKE 1 TABLET BY MOUTH EVERY 6 HOURS AS NEEDED FOR           PAIN        Kit MICAH Chris

## 2022-02-01 RX ORDER — TRAMADOL HYDROCHLORIDE 50 MG/1
50 TABLET ORAL EVERY 6 HOURS PRN
Qty: 30 TABLET | Refills: 0 | Status: SHIPPED | OUTPATIENT
Start: 2022-02-01 | End: 2022-02-14

## 2022-02-13 ENCOUNTER — ANCILLARY PROCEDURE (OUTPATIENT)
Dept: GENERAL RADIOLOGY | Facility: CLINIC | Age: 72
End: 2022-02-13
Attending: PHYSICIAN ASSISTANT
Payer: COMMERCIAL

## 2022-02-13 ENCOUNTER — OFFICE VISIT (OUTPATIENT)
Dept: URGENT CARE | Facility: URGENT CARE | Age: 72
End: 2022-02-13
Payer: COMMERCIAL

## 2022-02-13 VITALS
HEART RATE: 112 BPM | TEMPERATURE: 98.1 F | DIASTOLIC BLOOD PRESSURE: 88 MMHG | OXYGEN SATURATION: 98 % | BODY MASS INDEX: 23.02 KG/M2 | WEIGHT: 136.2 LBS | SYSTOLIC BLOOD PRESSURE: 158 MMHG

## 2022-02-13 DIAGNOSIS — M25.562 ACUTE PAIN OF LEFT KNEE: Primary | ICD-10-CM

## 2022-02-13 DIAGNOSIS — E11.22 TYPE 2 DIABETES MELLITUS WITH STAGE 3 CHRONIC KIDNEY DISEASE, WITHOUT LONG-TERM CURRENT USE OF INSULIN, UNSPECIFIED WHETHER STAGE 3A OR 3B CKD (H): ICD-10-CM

## 2022-02-13 DIAGNOSIS — N18.30 TYPE 2 DIABETES MELLITUS WITH STAGE 3 CHRONIC KIDNEY DISEASE, WITHOUT LONG-TERM CURRENT USE OF INSULIN, UNSPECIFIED WHETHER STAGE 3A OR 3B CKD (H): ICD-10-CM

## 2022-02-13 DIAGNOSIS — I10 BENIGN ESSENTIAL HYPERTENSION: ICD-10-CM

## 2022-02-13 DIAGNOSIS — M25.469 KNEE SWELLING: ICD-10-CM

## 2022-02-13 DIAGNOSIS — I10 HYPERTENSION, UNSPECIFIED TYPE: ICD-10-CM

## 2022-02-13 DIAGNOSIS — M70.52 PATELLAR BURSITIS OF LEFT KNEE: ICD-10-CM

## 2022-02-13 DIAGNOSIS — N18.30 STAGE 3 CHRONIC KIDNEY DISEASE, UNSPECIFIED WHETHER STAGE 3A OR 3B CKD (H): ICD-10-CM

## 2022-02-13 DIAGNOSIS — M25.562 ACUTE PAIN OF LEFT KNEE: ICD-10-CM

## 2022-02-13 PROBLEM — E11.9 DIABETES MELLITUS (H): Status: ACTIVE | Noted: 2022-02-13

## 2022-02-13 LAB
BASOPHILS # BLD AUTO: 0 10E3/UL (ref 0–0.2)
BASOPHILS NFR BLD AUTO: 1 %
EOSINOPHIL # BLD AUTO: 0.1 10E3/UL (ref 0–0.7)
EOSINOPHIL NFR BLD AUTO: 1 %
ERYTHROCYTE [DISTWIDTH] IN BLOOD BY AUTOMATED COUNT: 11.4 % (ref 10–15)
HCT VFR BLD AUTO: 35.2 % (ref 40–53)
HGB BLD-MCNC: 11.5 G/DL (ref 13.3–17.7)
IMM GRANULOCYTES # BLD: 0 10E3/UL
IMM GRANULOCYTES NFR BLD: 0 %
LYMPHOCYTES # BLD AUTO: 2.1 10E3/UL (ref 0.8–5.3)
LYMPHOCYTES NFR BLD AUTO: 33 %
MCH RBC QN AUTO: 33.9 PG (ref 26.5–33)
MCHC RBC AUTO-ENTMCNC: 32.7 G/DL (ref 31.5–36.5)
MCV RBC AUTO: 104 FL (ref 78–100)
MONOCYTES # BLD AUTO: 0.7 10E3/UL (ref 0–1.3)
MONOCYTES NFR BLD AUTO: 11 %
NEUTROPHILS # BLD AUTO: 3.4 10E3/UL (ref 1.6–8.3)
NEUTROPHILS NFR BLD AUTO: 55 %
PLATELET # BLD AUTO: 209 10E3/UL (ref 150–450)
RBC # BLD AUTO: 3.39 10E6/UL (ref 4.4–5.9)
WBC # BLD AUTO: 6.3 10E3/UL (ref 4–11)

## 2022-02-13 PROCEDURE — 80048 BASIC METABOLIC PNL TOTAL CA: CPT | Performed by: PHYSICIAN ASSISTANT

## 2022-02-13 PROCEDURE — 73562 X-RAY EXAM OF KNEE 3: CPT | Mod: LT | Performed by: RADIOLOGY

## 2022-02-13 PROCEDURE — 80061 LIPID PANEL: CPT | Performed by: PHYSICIAN ASSISTANT

## 2022-02-13 PROCEDURE — 36415 COLL VENOUS BLD VENIPUNCTURE: CPT | Performed by: PHYSICIAN ASSISTANT

## 2022-02-13 PROCEDURE — 84550 ASSAY OF BLOOD/URIC ACID: CPT | Performed by: PHYSICIAN ASSISTANT

## 2022-02-13 PROCEDURE — 85025 COMPLETE CBC W/AUTO DIFF WBC: CPT | Performed by: PHYSICIAN ASSISTANT

## 2022-02-13 PROCEDURE — 99214 OFFICE O/P EST MOD 30 MIN: CPT | Performed by: PHYSICIAN ASSISTANT

## 2022-02-13 RX ORDER — PREDNISONE 20 MG/1
40 TABLET ORAL DAILY
Qty: 10 TABLET | Refills: 0 | Status: SHIPPED | OUTPATIENT
Start: 2022-02-13 | End: 2022-02-18

## 2022-02-13 RX ORDER — PREDNISONE 20 MG/1
40 TABLET ORAL DAILY
Qty: 10 TABLET | Refills: 0 | Status: SHIPPED | OUTPATIENT
Start: 2022-02-13 | End: 2022-02-13

## 2022-02-13 ASSESSMENT — ENCOUNTER SYMPTOMS
CHILLS: 0
COLOR CHANGE: 1
WHEEZING: 0
SORE THROAT: 0
VOMITING: 0
ABDOMINAL PAIN: 0
MYALGIAS: 0
PALPITATIONS: 0
COUGH: 0
FEVER: 0
HEMATURIA: 0
CARDIOVASCULAR NEGATIVE: 1
CONSTITUTIONAL NEGATIVE: 1
ARTHRALGIAS: 1
GASTROINTESTINAL NEGATIVE: 1
DIARRHEA: 0
FATIGUE: 0
LIGHT-HEADEDNESS: 0
APPETITE CHANGE: 0
NECK PAIN: 0
UNEXPECTED WEIGHT CHANGE: 0
HEADACHES: 0
SLEEP DISTURBANCE: 0
WEAKNESS: 0
NECK STIFFNESS: 0
NAUSEA: 0
BACK PAIN: 0
CHOKING: 0
NUMBNESS: 0
WOUND: 0
CHEST TIGHTNESS: 0
DYSURIA: 0
SHORTNESS OF BREATH: 0
DIAPHORESIS: 0
FREQUENCY: 0
STRIDOR: 0
APNEA: 0
RESPIRATORY NEGATIVE: 1
ALLERGIC/IMMUNOLOGIC NEGATIVE: 1

## 2022-02-13 NOTE — PATIENT INSTRUCTIONS
Patient Education     Bursitis    You have bursitis. This is an inflammation of the bursa. These are small, fluid-filled sacs that surround the larger joints of the body. The bursa help the muscles and tendons move smoothly over the joints.  Bursitis often happens in the shoulder. But it can also affect the elbows, hips, pelvis, knees, toes, and heels. Bursitis can be caused by injury, overuse of the joint, or infection of the bursa. Symptoms include pain and tenderness over a joint. Symptoms get worse with movement.  Bursitis is treated with an anti-inflammatory medicine and by resting the joint. More severe cases require injection of medicine directly into the bursa. In the case of infection, surgery and antibiotics may be needed.  Home care    Rest the painful joint and protect it from movement. This will allow the inflammation to heal faster.    Apply an ice pack over the injured area for no more than 15 to 20 minutes. Do this every 3 to 6 hours for the first 24 to 48 hours. Keep using ice packs 3 to 4 times a day until the pain and swelling improves.     To make an ice pack, put ice cubes in a sealed plastic bag. Wrap the bag in a clean, thin towel or cloth. Never put ice or an ice pack directly on the skin. As the ice melts, be careful to not to get the wrap or splint wet.    You may take over-the-counter pain medicine to treat pain and inflammation, unless another medicine was prescribed. Anti-inflammatory pain medicines may be more effective. Talk with your provider before using these medicines if you have chronic liver or kidney disease, or ever had a stomach ulcer or gastrointestinal bleeding.    As your symptoms improve, slowly begin to move the joint. Don't overuse the joint. This may cause the symptoms to flare up again.  When to seek medical advice  Call your healthcare provider right away if any of these occur:    Redness or warmth over the painful area    Increasing pain or swelling at the  joint    Fever of 100.4 F (38 C) or above lasting for 24 to 48 hours, or as advised    [employer]  [place] last reviewed this educational content on 5/1/2018 2000-2021 [employer]. All rights reserved. This information is not intended as a substitute for professional medical care. Always follow your healthcare professional's instructions.

## 2022-02-13 NOTE — PROGRESS NOTES
Chief Complaint:    Chief Complaint   Patient presents with     Knee Pain     Severe left knee pain, red, swollen. Onset- Friday          Medical Decision Making:    Vital signs reviewed by Ezio Gee PA-C  BP (!) 158/88   Pulse 112   Temp 98.1  F (36.7  C) (Tympanic)   Wt 61.8 kg (136 lb 3.2 oz)   SpO2 98%   BMI 23.02 kg/m      Differential Diagnosis:  MS Injury Pain: sprain, fracture, tendonitis, muscle strain, contusion, dislocation, bursitis, gout, septic arthritis and osteoarthritis      ASSESSMENT:     1. Acute pain of left knee    2. Patellar bursitis of left knee    3. Knee swelling    4. Stage 3 chronic kidney disease, unspecified whether stage 3a or 3b CKD (H)    5. Benign essential hypertension    6. Hypertension, unspecified type    7. Type 2 diabetes mellitus with stage 3 chronic kidney disease, without long-term current use of insulin, unspecified whether stage 3a or 3b CKD (H)           PLAN:     XR of the L knee was negative for any acute fracture per my read.  CBC was negative for elevated WBC.  Low suspicion for cellulitis or septic joint at this time.    Uric acid is pending.  ROYER discussed.  Suspect prepatellar bursitis. With CKD, NSAIDS contraindicated.  Rx for Prednisone sent in.  Tylenol for pain.   Patient instructed to monitor blood sugars closely with Steroid use and follow up with PCP for any DM medication changes if needed.  Patient is hypertensive in clinic today.  Second BP reading was also above goal at 158/88.  Patient instructed to follow up with PCP in the next week for BP recheck.  Sooner if symptoms worsen.  Worrisome symptoms discussed with instructions to go to the ED.  Patient verbalized understanding and agreed with this plan.    Labs:     Results for orders placed or performed in visit on 02/13/22   XR Knee Left 3 Views     Status: None    Narrative    EXAM: XR KNEE LEFT 3 VIEWS  LOCATION: [facility]  DATE/TIME: 2/13/2022 3:34  PM    INDICATION: Worsening knee pain and swelling.  COMPARISON: None.      Impression    IMPRESSION: Chronic enthesitis at the extensor tendon attachments to the patella and the anterior tibial tubercle. There is no evidence for fracture or significant effusion. There is narrowing of the medial compartment.   Results for orders placed or performed in visit on 02/13/22   Uric acid     Status: Abnormal   Result Value Ref Range    Uric Acid 8.2 (H) 3.5 - 7.2 mg/dL   CBC with platelets and differential     Status: Abnormal   Result Value Ref Range    WBC Count 6.3 4.0 - 11.0 10e3/uL    RBC Count 3.39 (L) 4.40 - 5.90 10e6/uL    Hemoglobin 11.5 (L) 13.3 - 17.7 g/dL    Hematocrit 35.2 (L) 40.0 - 53.0 %     (H) 78 - 100 fL    MCH 33.9 (H) 26.5 - 33.0 pg    MCHC 32.7 31.5 - 36.5 g/dL    RDW 11.4 10.0 - 15.0 %    Platelet Count 209 150 - 450 10e3/uL    % Neutrophils 55 %    % Lymphocytes 33 %    % Monocytes 11 %    % Eosinophils 1 %    % Basophils 1 %    % Immature Granulocytes 0 %    Absolute Neutrophils 3.4 1.6 - 8.3 10e3/uL    Absolute Lymphocytes 2.1 0.8 - 5.3 10e3/uL    Absolute Monocytes 0.7 0.0 - 1.3 10e3/uL    Absolute Eosinophils 0.1 0.0 - 0.7 10e3/uL    Absolute Basophils 0.0 0.0 - 0.2 10e3/uL    Absolute Immature Granulocytes 0.0 <=0.4 10e3/uL   Lipid panel reflex to direct LDL Fasting     Status: Normal   Result Value Ref Range    Cholesterol 143 <200 mg/dL    Triglycerides 106 <150 mg/dL    Direct Measure HDL 55 >=40 mg/dL    LDL Cholesterol Calculated 67 <=100 mg/dL    Non HDL Cholesterol 88 <130 mg/dL    Narrative    Cholesterol  Desirable:  <200 mg/dL    Triglycerides  Normal:  Less than 150 mg/dL  Borderline High:  150-199 mg/dL  High:  200-499 mg/dL  Very High:  Greater than or equal to 500 mg/dL    Direct Measure HDL  Female:  Greater than or equal to 50 mg/dL   Male:  Greater than or equal to 40 mg/dL    LDL Cholesterol  Desirable:  <100mg/dL  Above Desirable:  100-129 mg/dL   Borderline High:   130-159 mg/dL   High:  160-189 mg/dL   Very High:  >= 190 mg/dL    Non HDL Cholesterol  Desirable:  130 mg/dL  Above Desirable:  130-159 mg/dL  Borderline High:  160-189 mg/dL  High:  190-219 mg/dL  Very High:  Greater than or equal to 220 mg/dL   BASIC METABOLIC PANEL     Status: Abnormal   Result Value Ref Range    Sodium 137 133 - 144 mmol/L    Potassium 4.2 3.4 - 5.3 mmol/L    Chloride 103 94 - 109 mmol/L    Carbon Dioxide (CO2) 28 20 - 32 mmol/L    Anion Gap 6 3 - 14 mmol/L    Urea Nitrogen 19 7 - 30 mg/dL    Creatinine 1.39 (H) 0.66 - 1.25 mg/dL    Calcium 9.2 8.5 - 10.1 mg/dL    Glucose 314 (H) 70 - 99 mg/dL    GFR Estimate 54 (L) >60 mL/min/1.73m2   CBC with platelets and differential     Status: Abnormal    Narrative    The following orders were created for panel order CBC with platelets and differential.  Procedure                               Abnormality         Status                     ---------                               -----------         ------                     CBC with platelets and d...[[id number]]  Abnormal            Final result                 Please view results for these tests on the individual orders.       Current Meds:    Current Outpatient Medications:      Acetaminophen (TYLENOL PO), , Disp: , Rfl:      aspirin 81 MG tablet, Take 81 mg by mouth daily, Disp: , Rfl:      atorvastatin (LIPITOR) 10 MG tablet, Take 1 tablet (10 mg) by mouth daily, Disp: 90 tablet, Rfl: 3     lidocaine (LIDODERM) 5 % patch, Place 1 patch onto the skin every 24 hours To prevent lidocaine toxicity, patient should be patch free for 12 hrs daily., Disp: 15 patch, Rfl: 0     predniSONE (DELTASONE) 20 MG tablet, Take 2 tablets (40 mg) by mouth daily for 5 days, Disp: 10 tablet, Rfl: 0     glipiZIDE (GLUCOTROL XL) 5 MG 24 hr tablet, Take 1 tablet (5 mg) by mouth daily, Disp: 90 tablet, Rfl: 0     lisinopril (ZESTRIL) 20 MG tablet, Take 1 tablet (20 mg) by mouth daily, Disp: 90 tablet, Rfl: 1     metFORMIN  (GLUCOPHAGE) 1000 MG tablet, Take 1 tablet (1,000 mg) by mouth 2 times daily (with meals) Do not refill until contacted by the patient, Disp: 180 tablet, Rfl: 1     traMADol (ULTRAM) 50 MG tablet, Take 1 tablet (50 mg) by mouth every 6 hours as needed for severe pain Patient needs an office appointment, Disp: 90 tablet, Rfl: 0    Allergies:  No Known Allergies    SUBJECTIVE    HPI: Devon Watts is an 71 year old male who presents for evaluation and treatment of L knee pain, swelling and redness. The pain started 2 days ago and has gradually worsened since then. The pain is localized over his left patella and is very tender. In addition to pain, there is redness and swelling over the patella, which limits his range of motion. Aggravating factors include pressure on the area and movement of the join. Treatments tried include Tylenol and tramadol, both of which only alleviate the pain slightly.    The patient denies shortness of breath, chills, fatigue, altered mental status, tachypnea, palpitations, nausea, vomiting, diarrhea, or appetite changes. The patient has no history of gout or recent infections.     ROS:      Review of Systems   Constitutional: Negative.  Negative for appetite change, chills, diaphoresis, fatigue, fever and unexpected weight change.   HENT: Negative.  Negative for sore throat.    Eyes: Negative for visual disturbance.   Respiratory: Negative.  Negative for apnea, cough, choking, chest tightness, shortness of breath, wheezing and stridor.    Cardiovascular: Negative.  Negative for chest pain, palpitations and leg swelling.   Gastrointestinal: Negative.  Negative for abdominal pain, diarrhea, nausea and vomiting.   Genitourinary: Negative for dysuria, frequency, hematuria and urgency.   Musculoskeletal: Positive for arthralgias and gait problem. Negative for back pain, myalgias, neck pain and neck stiffness.   Skin: Positive for color change. Negative for rash and wound.    Allergic/Immunologic: Negative.  Negative for immunocompromised state.   Neurological: Negative for weakness, light-headedness, numbness and headaches.   Psychiatric/Behavioral: Negative for sleep disturbance.        Family History   Family History   Problem Relation Age of Onset     Hypertension Son      Diabetes Son      Breast Cancer No family hx of      Colon Cancer No family hx of      Prostate Cancer No family hx of      Other Cancer No family hx of        Social History  Social History     Socioeconomic History     Marital status:      Spouse name: Not on file     Number of children: Not on file     Years of education: Not on file     Highest education level: Not on file   Occupational History     Not on file   Tobacco Use     Smoking status: Never Smoker     Smokeless tobacco: Never Used   Vaping Use     Vaping Use: Never used   Substance and Sexual Activity     Alcohol use: No     Drug use: No     Sexual activity: Yes     Partners: Female   Other Topics Concern     Parent/sibling w/ CABG, MI or angioplasty before 65F 55M? No   Social History Narrative     Not on file     Social Determinants of Health     Financial Resource Strain: Not on file   Food Insecurity: Not on file   Transportation Needs: Not on file   Physical Activity: Not on file   Stress: Not on file   Social Connections: Not on file   Intimate Partner Violence: Not on file   Housing Stability: Not on file        Surgical History:  No past surgical history on file.     Problem List:  Patient Active Problem List   Diagnosis     Hyperlipidemia with target LDL less than 100     Benign essential hypertension     Impingement syndrome of both shoulders     CKD (chronic kidney disease) stage 3, GFR 30-59 ml/min (H)     Chronic, continuous use of opioids     Type 2 diabetes mellitus with stage 3 chronic kidney disease, without long-term current use of insulin, unspecified whether stage 3a or 3b CKD (H)     HTN (hypertension)     Hypothyroidism      Mantoux: positive           OBJECTIVE:     Vital signs noted and reviewed by Ezio Gee PA-C  BP (!) 158/88   Pulse 112   Temp 98.1  F (36.7  C) (Tympanic)   Wt 61.8 kg (136 lb 3.2 oz)   SpO2 98%   BMI 23.02 kg/m       PEFR:    Physical Exam  Vitals and nursing note reviewed.   Constitutional:       General: He is not in acute distress.     Appearance: He is well-developed. He is not ill-appearing, toxic-appearing or diaphoretic.   HENT:      Head: Normocephalic and atraumatic.      Right Ear: Hearing, tympanic membrane, ear canal and external ear normal. Tympanic membrane is not perforated, erythematous, retracted or bulging.      Left Ear: Hearing, tympanic membrane, ear canal and external ear normal. Tympanic membrane is not perforated, erythematous, retracted or bulging.      Nose: Nose normal. No mucosal edema, congestion or rhinorrhea.      Mouth/Throat:      Pharynx: No oropharyngeal exudate or posterior oropharyngeal erythema.      Tonsils: No tonsillar exudate or tonsillar abscesses. 0 on the right. 0 on the left.   Eyes:      Pupils: Pupils are equal, round, and reactive to light.   Cardiovascular:      Rate and Rhythm: Normal rate and regular rhythm.      Heart sounds: Normal heart sounds, S1 normal and S2 normal. Heart sounds not distant. No murmur heard.  No friction rub. No gallop.    Pulmonary:      Effort: Pulmonary effort is normal. No respiratory distress.      Breath sounds: Normal breath sounds. No decreased breath sounds, wheezing, rhonchi or rales.   Abdominal:      General: Bowel sounds are normal. There is no distension.      Palpations: Abdomen is soft.      Tenderness: There is no abdominal tenderness.   Musculoskeletal:         General: Swelling and tenderness present. No deformity or signs of injury.      Cervical back: Normal range of motion and neck supple. No rigidity.      Left knee: Erythema present. No swelling, deformity, effusion, ecchymosis, lacerations or  crepitus. Decreased range of motion. Tenderness present over the patellar tendon. No LCL laxity, MCL laxity, ACL laxity or PCL laxity.Normal alignment, normal meniscus and normal patellar mobility. Normal pulse.        Legs:    Lymphadenopathy:      Cervical: No cervical adenopathy.   Skin:     General: Skin is warm and dry.      Findings: Erythema present. No bruising, lesion or rash.   Neurological:      Mental Status: He is alert.      Cranial Nerves: No cranial nerve deficit.      Sensory: No sensory deficit.      Motor: No weakness.      Coordination: Coordination normal.   Psychiatric:         Attention and Perception: He is attentive.         Speech: Speech normal.         Behavior: Behavior normal. Behavior is cooperative.         Thought Content: Thought content normal.         Judgment: Judgment normal.             Ezio Gee PA-C  2/13/2022, 3:10 PM

## 2022-02-14 ENCOUNTER — OFFICE VISIT (OUTPATIENT)
Dept: FAMILY MEDICINE | Facility: CLINIC | Age: 72
End: 2022-02-14
Payer: COMMERCIAL

## 2022-02-14 ENCOUNTER — TELEPHONE (OUTPATIENT)
Dept: FAMILY MEDICINE | Facility: CLINIC | Age: 72
End: 2022-02-14

## 2022-02-14 VITALS
BODY MASS INDEX: 22.76 KG/M2 | TEMPERATURE: 98.1 F | HEIGHT: 65 IN | RESPIRATION RATE: 16 BRPM | HEART RATE: 117 BPM | DIASTOLIC BLOOD PRESSURE: 76 MMHG | SYSTOLIC BLOOD PRESSURE: 134 MMHG | OXYGEN SATURATION: 100 % | WEIGHT: 136.6 LBS

## 2022-02-14 DIAGNOSIS — M75.41 IMPINGEMENT SYNDROME OF BOTH SHOULDERS: ICD-10-CM

## 2022-02-14 DIAGNOSIS — I10 BENIGN ESSENTIAL HYPERTENSION: ICD-10-CM

## 2022-02-14 DIAGNOSIS — M70.42 PREPATELLAR BURSITIS OF LEFT KNEE: ICD-10-CM

## 2022-02-14 DIAGNOSIS — M75.42 IMPINGEMENT SYNDROME OF BOTH SHOULDERS: ICD-10-CM

## 2022-02-14 DIAGNOSIS — N18.30 TYPE 2 DIABETES MELLITUS WITH STAGE 3 CHRONIC KIDNEY DISEASE, WITHOUT LONG-TERM CURRENT USE OF INSULIN, UNSPECIFIED WHETHER STAGE 3A OR 3B CKD (H): Primary | ICD-10-CM

## 2022-02-14 DIAGNOSIS — E11.22 TYPE 2 DIABETES MELLITUS WITH STAGE 3 CHRONIC KIDNEY DISEASE, WITHOUT LONG-TERM CURRENT USE OF INSULIN, UNSPECIFIED WHETHER STAGE 3A OR 3B CKD (H): Primary | ICD-10-CM

## 2022-02-14 DIAGNOSIS — E78.5 HYPERLIPIDEMIA WITH TARGET LDL LESS THAN 100: ICD-10-CM

## 2022-02-14 LAB
ANION GAP SERPL CALCULATED.3IONS-SCNC: 6 MMOL/L (ref 3–14)
BUN SERPL-MCNC: 19 MG/DL (ref 7–30)
CALCIUM SERPL-MCNC: 9.2 MG/DL (ref 8.5–10.1)
CHLORIDE BLD-SCNC: 103 MMOL/L (ref 94–109)
CHOLEST SERPL-MCNC: 143 MG/DL
CO2 SERPL-SCNC: 28 MMOL/L (ref 20–32)
CREAT SERPL-MCNC: 1.39 MG/DL (ref 0.66–1.25)
GFR SERPL CREATININE-BSD FRML MDRD: 54 ML/MIN/1.73M2
GLUCOSE BLD-MCNC: 314 MG/DL (ref 70–99)
HBA1C MFR BLD: 8 % (ref 0–5.6)
HDLC SERPL-MCNC: 55 MG/DL
LDLC SERPL CALC-MCNC: 67 MG/DL
NONHDLC SERPL-MCNC: 88 MG/DL
POTASSIUM BLD-SCNC: 4.2 MMOL/L (ref 3.4–5.3)
SODIUM SERPL-SCNC: 137 MMOL/L (ref 133–144)
TRIGL SERPL-MCNC: 106 MG/DL
URATE SERPL-MCNC: 8.2 MG/DL (ref 3.5–7.2)

## 2022-02-14 PROCEDURE — 82043 UR ALBUMIN QUANTITATIVE: CPT | Performed by: FAMILY MEDICINE

## 2022-02-14 PROCEDURE — 36415 COLL VENOUS BLD VENIPUNCTURE: CPT | Performed by: FAMILY MEDICINE

## 2022-02-14 PROCEDURE — 99214 OFFICE O/P EST MOD 30 MIN: CPT | Performed by: FAMILY MEDICINE

## 2022-02-14 PROCEDURE — 83036 HEMOGLOBIN GLYCOSYLATED A1C: CPT | Performed by: FAMILY MEDICINE

## 2022-02-14 RX ORDER — GLIPIZIDE 5 MG/1
5 TABLET, FILM COATED, EXTENDED RELEASE ORAL DAILY
Qty: 90 TABLET | Refills: 0 | Status: SHIPPED | OUTPATIENT
Start: 2022-02-14 | End: 2022-05-06

## 2022-02-14 RX ORDER — TRAMADOL HYDROCHLORIDE 50 MG/1
50 TABLET ORAL EVERY 6 HOURS PRN
Qty: 90 TABLET | Refills: 0 | Status: SHIPPED | OUTPATIENT
Start: 2022-02-14 | End: 2022-04-14

## 2022-02-14 RX ORDER — LISINOPRIL 20 MG/1
20 TABLET ORAL DAILY
Qty: 90 TABLET | Refills: 1 | Status: SHIPPED | OUTPATIENT
Start: 2022-02-14 | End: 2022-10-03

## 2022-02-14 ASSESSMENT — MIFFLIN-ST. JEOR: SCORE: 1288.55

## 2022-02-14 ASSESSMENT — PAIN SCALES - GENERAL: PAINLEVEL: SEVERE PAIN (7)

## 2022-02-14 NOTE — TELEPHONE ENCOUNTER
Please call the patient and let him know the below.  Did not realize he does not have my chart.    Nicole Magdaleno DO      Dear Devon,    Thank you for getting your labs done.  The numbers were all in a normal range except your kidney function is a little diminished.  This is considered chronic kidney disease stage III.  I want you to increase your fluids and we can recheck this in a few weeks.   Your uric acid level from the urgent care was also elevated.  This could you at a higher risk for gout.  However your exam points towards the patellar bursitis.  The prednisone you are taking would treat both gout and bursitis.  Feel free to email or call if you have any questions.     Take care,     Nicole Magdaleno D.O.

## 2022-02-14 NOTE — LETTER
February 15, 2022      Devon DESTINY Rashaddylan  [address] [facility] [id number]        Dear ,    We are writing to inform you of your test results.    Your results are normal.    Resulted Orders   HEMOGLOBIN A1C   Result Value Ref Range    Hemoglobin A1C 8.0 (H) 0.0 - 5.6 %      Comment:      Normal <5.7%   Prediabetes 5.7-6.4%    Diabetes 6.5% or higher     Note: Adopted from ADA consensus guidelines.   Albumin Random Urine Quantitative with Creat Ratio   Result Value Ref Range    Creatinine Urine mg/dL 67 mg/dL    Albumin Urine mg/L <5 mg/L    Albumin Urine mg/g Cr        Comment:      Unable to calculate:  Urine creatinine or albumin value below detectable level       If you have any questions or concerns, please call the clinic at the number listed above.       Sincerely,      Nicole Magdaleno, DO
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
## 2022-02-14 NOTE — PROGRESS NOTES
Assessment & Plan     Type 2 diabetes mellitus with stage 3 chronic kidney disease, without long-term current use of insulin, unspecified whether stage 3a or 3b CKD (H)  Based on the patient's #6 months ago.  His blood sugar has gone up so we will restart glipizide a lower dose.    - glipiZIDE (GLUCOTROL XL) 5 MG 24 hr tablet; Take 1 tablet (5 mg) by mouth daily    Prepatellar bursitis of left knee  The patient was prescribed prednisone at urgent care yesterday.  He states his pain is improving significantly.  He does have atrophy of the left quad which could be contributing to this patellar bursitis/tendinitis we will have him do physical therapy for this after the acute episode has passed  - [facility]; Future    Impingement syndrome of both shoulders  I suggested x-rays today patient declined at this time.  He is taking approximately 1 tramadol a day.  He is interested in trying more physical therapy for the shoulders bilaterally.  Did also discuss possible injections to help with the pain.  - traMADol (ULTRAM) 50 MG tablet; Take 1 tablet (50 mg) by mouth every 6 hours as needed for severe pain Patient needs an office appointment  - [facility]; Future    Benign essential hypertension  The current medical regimen is effective;  continue present plan and medications.  - BASIC METABOLIC PANEL; Future  - lisinopril (ZESTRIL) 20 MG tablet; Take 1 tablet (20 mg) by mouth daily    Hyperlipidemia with target LDL less than 100  The current medical regimen is effective;  continue present plan and medications.      32 minutes spent on the date of the encounter doing chart review, history and exam, documentation and further activities per the note           Return in about 3 months (around 5/14/2022) for BP Recheck, diabetes.    Nicole Magdaleno DO  [facility]   Devon is a 71 year old who presents for the following health issues     HPI     Diabetes  Follow-up    How often are you checking your blood sugar? One time daily  What time of day are you checking your blood sugars (select all that apply)?  Before meals  Have you had any blood sugars above 200?  No  Have you had any blood sugars below 70?  No    What symptoms do you notice when your blood sugar is low?  None    What concerns do you have today about your diabetes? None     Do you have any of these symptoms? (Select all that apply)  No numbness or tingling in feet.  No redness, sores or blisters on feet.  No complaints of excessive thirst.  No reports of blurry vision.  No significant changes to weight.   Lab Results     Lab Results   Component Value Date    A1C 8.0 2022    A1C 6.8 2021    A1C 7.2 2021    A1C 7.2 2020    A1C 7.0 2019    A1C 6.2 2019    A1C 5.9 2018           Metformin at 1000 mg twice daily        Hyperlipidemia Follow-Up      Are you regularly taking any medication or supplement to lower your cholesterol?   Yes- atorvastatin    Are you having muscle aches or other side effects that you think could be caused by your cholesterol lowering medication?  Yes- some weakness   LDL Cholesterol Calculated   Date Value Ref Range Status   2021 82 <100 mg/dL Final     Comment:     Desirable:       <100 mg/dl           Hypertension Follow-up      Do you check your blood pressure regularly outside of the clinic? Yes     Are you following a low salt diet? Yes    Are your blood pressures ever more than 140 on the top number (systolic) OR more   than 90 on the bottom number (diastolic), for example 140/90? No  146/76- av-132/ 68-72  Lisinopril 20 mg daily    BP Readings from Last 2 Encounters:   22 134/76   22 (!) 158/88     Hemoglobin A1C POCT (%)   Date Value   2021 7.2 (H)   2020 7.2 (H)     Hemoglobin A1C (%)   Date Value   2022 8.0 (H)   2021 6.8 (H)     LDL Cholesterol Calculated (mg/dL)   Date Value  "  03/08/2021 82   11/19/2019 80         How many servings of fruits and vegetables do you eat daily?  2-3    On average, how many sweetened beverages do you drink each day (Examples: soda, juice, sweet tea, etc.  Do NOT count diet or artificially sweetened beverages)?   1-2     How many days per week do you exercise enough to make your heart beat faster? 5    How many minutes a day do you exercise enough to make your heart beat faster? 20 - 29    How many days per week do you miss taking your medication? 0    The patient takes tramadol for bilateral chronic shoulder pain.  He takes tramadol 90 tablets and a 3-month.  He did not have any x-rays of his shoulder.  He has done physical therapy for it.  He has not had steroid injections.    Review of Systems   Constitutional, HEENT, cardiovascular, pulmonary, gi and gu systems are negative, except as otherwise noted.      Objective    /76   Pulse 117   Temp 98.1  F (36.7  C) (Oral)   Resp 16   Ht 1.638 m (5' 4.5\")   Wt 62 kg (136 lb 9.6 oz)   SpO2 100%   BMI 23.09 kg/m    Body mass index is 23.09 kg/m .  Physical Exam   GENERAL: healthy, alert and no distress  NECK: no adenopathy, no asymmetry, masses, or scars and thyroid normal to palpation  RESP: lungs clear to auscultation - no rales, rhonchi or wheezes  CV: regular rate and rhythm, normal S1 S2, no S3 or S4, no murmur, click or rub, no peripheral edema and peripheral pulses strong  MS: Swelling and tenderness over the left knee anteriorly.  Point of maximal tenderness patellar bursa  SKIN: Slight erythema over patellar bursa  PSYCH: mentation appears normal, affect normal/bright    Results for orders placed or performed in visit on 02/14/22 (from the past 24 hour(s))   HEMOGLOBIN A1C   Result Value Ref Range    Hemoglobin A1C 8.0 (H) 0.0 - 5.6 %           "

## 2022-02-15 LAB
CREAT UR-MCNC: 67 MG/DL
MICROALBUMIN UR-MCNC: <5 MG/L
MICROALBUMIN/CREAT UR: NORMAL MG/G{CREAT}

## 2022-04-14 DIAGNOSIS — M75.42 IMPINGEMENT SYNDROME OF BOTH SHOULDERS: ICD-10-CM

## 2022-04-14 DIAGNOSIS — M75.41 IMPINGEMENT SYNDROME OF BOTH SHOULDERS: ICD-10-CM

## 2022-04-14 RX ORDER — TRAMADOL HYDROCHLORIDE 50 MG/1
TABLET ORAL
Qty: 90 TABLET | Refills: 0 | Status: SHIPPED | OUTPATIENT
Start: 2022-04-14 | End: 2022-06-14

## 2022-05-06 ENCOUNTER — OFFICE VISIT (OUTPATIENT)
Dept: FAMILY MEDICINE | Facility: CLINIC | Age: 72
End: 2022-05-06
Payer: COMMERCIAL

## 2022-05-06 VITALS
HEIGHT: 65 IN | RESPIRATION RATE: 12 BRPM | WEIGHT: 139.2 LBS | OXYGEN SATURATION: 98 % | BODY MASS INDEX: 23.19 KG/M2 | HEART RATE: 94 BPM | TEMPERATURE: 98.2 F | DIASTOLIC BLOOD PRESSURE: 76 MMHG | SYSTOLIC BLOOD PRESSURE: 134 MMHG

## 2022-05-06 DIAGNOSIS — I10 BENIGN ESSENTIAL HYPERTENSION: ICD-10-CM

## 2022-05-06 DIAGNOSIS — E78.5 HYPERLIPIDEMIA WITH TARGET LDL LESS THAN 100: ICD-10-CM

## 2022-05-06 DIAGNOSIS — E11.22 TYPE 2 DIABETES MELLITUS WITH STAGE 3 CHRONIC KIDNEY DISEASE, WITHOUT LONG-TERM CURRENT USE OF INSULIN, UNSPECIFIED WHETHER STAGE 3A OR 3B CKD (H): Primary | ICD-10-CM

## 2022-05-06 DIAGNOSIS — M75.42 IMPINGEMENT SYNDROME OF BOTH SHOULDERS: ICD-10-CM

## 2022-05-06 DIAGNOSIS — Z12.11 SCREEN FOR COLON CANCER: ICD-10-CM

## 2022-05-06 DIAGNOSIS — N18.30 TYPE 2 DIABETES MELLITUS WITH STAGE 3 CHRONIC KIDNEY DISEASE, WITHOUT LONG-TERM CURRENT USE OF INSULIN, UNSPECIFIED WHETHER STAGE 3A OR 3B CKD (H): Primary | ICD-10-CM

## 2022-05-06 DIAGNOSIS — M75.41 IMPINGEMENT SYNDROME OF BOTH SHOULDERS: ICD-10-CM

## 2022-05-06 LAB
ANION GAP SERPL CALCULATED.3IONS-SCNC: 5 MMOL/L (ref 3–14)
BUN SERPL-MCNC: 15 MG/DL (ref 7–30)
CALCIUM SERPL-MCNC: 9.2 MG/DL (ref 8.5–10.1)
CHLORIDE BLD-SCNC: 108 MMOL/L (ref 94–109)
CO2 SERPL-SCNC: 27 MMOL/L (ref 20–32)
CREAT SERPL-MCNC: 1.23 MG/DL (ref 0.66–1.25)
GFR SERPL CREATININE-BSD FRML MDRD: 62 ML/MIN/1.73M2
GLUCOSE BLD-MCNC: 110 MG/DL (ref 70–99)
HBA1C MFR BLD: 6.9 % (ref 0–5.6)
HOLD SPECIMEN: NORMAL
POTASSIUM BLD-SCNC: 4.5 MMOL/L (ref 3.4–5.3)
SODIUM SERPL-SCNC: 140 MMOL/L (ref 133–144)

## 2022-05-06 PROCEDURE — 99207 PR FOOT EXAM NO CHARGE: CPT | Performed by: FAMILY MEDICINE

## 2022-05-06 PROCEDURE — 36415 COLL VENOUS BLD VENIPUNCTURE: CPT | Performed by: FAMILY MEDICINE

## 2022-05-06 PROCEDURE — 99214 OFFICE O/P EST MOD 30 MIN: CPT | Mod: 25 | Performed by: FAMILY MEDICINE

## 2022-05-06 PROCEDURE — 91306 COVID-19,PF,MODERNA (18+ YRS BOOSTER .25ML): CPT | Performed by: FAMILY MEDICINE

## 2022-05-06 PROCEDURE — 0064A COVID-19,PF,MODERNA (18+ YRS BOOSTER .25ML): CPT | Performed by: FAMILY MEDICINE

## 2022-05-06 PROCEDURE — 80048 BASIC METABOLIC PNL TOTAL CA: CPT | Performed by: FAMILY MEDICINE

## 2022-05-06 PROCEDURE — 83036 HEMOGLOBIN GLYCOSYLATED A1C: CPT | Performed by: FAMILY MEDICINE

## 2022-05-06 RX ORDER — GLIPIZIDE 5 MG/1
5 TABLET, FILM COATED, EXTENDED RELEASE ORAL DAILY
Qty: 90 TABLET | Refills: 1 | Status: SHIPPED | OUTPATIENT
Start: 2022-05-06 | End: 2022-12-27

## 2022-05-06 ASSESSMENT — PAIN SCALES - GENERAL: PAINLEVEL: NO PAIN (0)

## 2022-05-06 NOTE — LETTER
May 9, 2022      Devon Watts  [address] [facility] [address]        Dear ,    We are writing to inform you of your test results.    The results of your recent lab tests were within normal limits. Enclosed is a copy of these results.  If you have any further questions or problems, please contact our office.      Resulted Orders   HEMOGLOBIN A1C   Result Value Ref Range    Hemoglobin A1C 6.9 (H) 0.0 - 5.6 %      Comment:      Normal <5.7%   Prediabetes 5.7-6.4%    Diabetes 6.5% or higher     Note: Adopted from ADA consensus guidelines.   Basic metabolic panel  (Ca, Cl, CO2, Creat, Gluc, K, Na, BUN)   Result Value Ref Range    Sodium 140 133 - 144 mmol/L    Potassium 4.5 3.4 - 5.3 mmol/L    Chloride 108 94 - 109 mmol/L    Carbon Dioxide (CO2) 27 20 - 32 mmol/L    Anion Gap 5 3 - 14 mmol/L    Urea Nitrogen 15 7 - 30 mg/dL    Creatinine 1.23 0.66 - 1.25 mg/dL    Calcium 9.2 8.5 - 10.1 mg/dL    Glucose 110 (H) 70 - 99 mg/dL    GFR Estimate 62 >60 mL/min/1.73m2      Comment:      Effective December 21, 2021 eGFRcr in adults is calculated using the 2021 CKD-EPI creatinine equation which includes age and gender ( et al., NEJM, DOI: 10.1056/IVODbe7135574)       If you have any questions or concerns, please call the clinic at the number listed above.       Sincerely,      Nicole Magdaleno DO/elle

## 2022-05-06 NOTE — PROGRESS NOTES
Assessment & Plan     Type 2 diabetes mellitus with stage 3 chronic kidney disease, without long-term current use of insulin, unspecified whether stage 3a or 3b CKD (H)    The current medical regimen is effective;  continue present plan and medications.    - HEMOGLOBIN A1C; Future  - COVID-19,PF,MODERNA (18+ YRS BOOSTER .25ML)  - Basic metabolic panel  (Ca, Cl, CO2, Creat, Gluc, K, Na, BUN); Future  - FOOT EXAM  - HEMOGLOBIN A1C  - Basic metabolic panel  (Ca, Cl, CO2, Creat, Gluc, K, Na, BUN)  - glipiZIDE (GLUCOTROL XL) 5 MG 24 hr tablet; Take 1 tablet (5 mg) by mouth daily  - metFORMIN (GLUCOPHAGE) 1000 MG tablet; Take 1 tablet (1,000 mg) by mouth 2 times daily (with meals) Do not refill until contacted by the patient    Benign essential hypertension  The current medical regimen is effective;  continue present plan and medications.      Hyperlipidemia with target LDL less than 100  The current medical regimen is effective;  continue present plan and medications.  Labs not due until February 2023    Impingement syndrome of both shoulders  Tramadol refill not due until the end of June    Screen for colon cancer  Colonoscopy due July 2022  - Adult Gastro Ref - Procedure Only; Future      30 minutes spent on the date of the encounter doing chart review, history and exam, documentation and further activities per the note        Return in about 6 months (around 11/6/2022) for diabetes.    Nicole Magdaleno DO  [facility]    Xenia Osorio is a 71 year old who presents for the following health issues     History of Present Illness       Diabetes:   He presents for follow up of diabetes.  He is checking home blood glucose one time daily. He checks blood glucose before meals and after meals.  Blood glucose is never over 200 and never under 70. When his blood glucose is low, the patient is asymptomatic for confusion, blurred vision, lethargy and reports not feeling dizzy, shaky, or weak.  He has  "no concerns regarding his diabetes at this time.  He is not experiencing numbness or burning in feet, excessive thirst, blurry vision, weight changes or redness, sores or blisters on feet.         Hypertension: He presents for follow up of hypertension.  He does check blood pressure  regularly outside of the clinic. Outpatient blood pressures have not been over 140/90. He does not follow a low salt diet.     He eats 0-1 servings of fruits and vegetables daily.He consumes 2 sweetened beverage(s) daily.He exercises with enough effort to increase his heart rate 30 to 60 minutes per day.    He is taking medications regularly.     For his diabetes the patient is taking metformin 1000 mg twice daily and glipizide 5 mg daily.  Lab Results   Component Value Date    A1C 6.9 05/06/2022    A1C 8.0 02/14/2022    A1C 6.8 07/23/2021    A1C 7.2 03/08/2021    A1C 7.2 08/04/2020    A1C 7.0 11/19/2019    A1C 6.2 01/03/2019    A1C 5.9 06/04/2018     For his hypertension patient is taking lisinopril 20 mg daily    For his dyslipidemia he is taking Lipitor 10 mg daily  LDL Cholesterol Calculated   Date Value Ref Range Status   02/13/2022 67 <=100 mg/dL Final   03/08/2021 82 <100 mg/dL Final     Comment:     Desirable:       <100 mg/dl     Patient has chronic pain in her shoulders bilaterally.  He has done physical therapy for this.  Does not have any recent imaging.  He has been taking tramadol.   THE TRAMADOL LASTS THREE  MONTHS.        Review of Systems   Constitutional, HEENT, cardiovascular, pulmonary, gi and gu systems are negative, except as otherwise noted.      Objective    BP (!) 154/80 (BP Location: Right arm, Patient Position: Sitting, Cuff Size: Adult Regular)   Pulse 94   Temp 98.2  F (36.8  C) (Oral)   Resp 12   Ht 1.638 m (5' 4.5\")   Wt 63.1 kg (139 lb 3.2 oz)   SpO2 98%   BMI 23.52 kg/m    Body mass index is 23.52 kg/m .  Physical Exam   GENERAL: healthy, alert and no distress  NECK: no adenopathy, no asymmetry, " masses, or scars and thyroid normal to palpation  RESP: lungs clear to auscultation - no rales, rhonchi or wheezes  CV: regular rate and rhythm, normal S1 S2, no S3 or S4, no murmur, click or rub, no peripheral edema and peripheral pulses strong  MS: Shoulder range of motion is diminished to flexion, crepitus at the glenohumeral joints bilaterally.  PSYCH: mentation appears normal, affect normal/bright  Diabetic foot exam: normal DP and PT pulses, no trophic changes or ulcerative lesions, normal sensory exam and normal monofilament exam

## 2022-06-07 ENCOUNTER — TELEPHONE (OUTPATIENT)
Dept: FAMILY MEDICINE | Facility: CLINIC | Age: 72
End: 2022-06-07
Payer: COMMERCIAL

## 2022-06-07 DIAGNOSIS — F11.90 CHRONIC, CONTINUOUS USE OF OPIOIDS: ICD-10-CM

## 2022-06-07 DIAGNOSIS — M75.41 IMPINGEMENT SYNDROME OF BOTH SHOULDERS: ICD-10-CM

## 2022-06-07 DIAGNOSIS — M75.42 IMPINGEMENT SYNDROME OF BOTH SHOULDERS: ICD-10-CM

## 2022-06-07 NOTE — TELEPHONE ENCOUNTER
Called and left message for patient to return call to clinic to discuss medication use      Saray Ann RN

## 2022-06-07 NOTE — TELEPHONE ENCOUNTER
This patient is tramadol is used to last him 3 months per our discussion last month at his office visit.  He should not need more until early July.  Please call to clarify    Nicole Magdaleno DO

## 2022-06-10 RX ORDER — TRAMADOL HYDROCHLORIDE 50 MG/1
50 TABLET ORAL EVERY 6 HOURS PRN
Qty: 90 TABLET | Refills: 0 | OUTPATIENT
Start: 2022-06-10

## 2022-06-13 ENCOUNTER — NURSE TRIAGE (OUTPATIENT)
Dept: NURSING | Facility: CLINIC | Age: 72
End: 2022-06-13
Payer: COMMERCIAL

## 2022-06-13 DIAGNOSIS — M75.42 IMPINGEMENT SYNDROME OF BOTH SHOULDERS: ICD-10-CM

## 2022-06-13 DIAGNOSIS — M75.41 IMPINGEMENT SYNDROME OF BOTH SHOULDERS: ICD-10-CM

## 2022-06-13 RX ORDER — TRAMADOL HYDROCHLORIDE 50 MG/1
TABLET ORAL
Qty: 90 TABLET | Refills: 0 | Status: CANCELLED | OUTPATIENT
Start: 2022-06-13

## 2022-06-13 NOTE — TELEPHONE ENCOUNTER
Nurse Triage SBAR    Is this a 2nd Level Triage? NO    Situation: Patient calling with question about where his Rx is.  .  Consent: not needed    Background: Pt called this morning for a Rx for tramadol and his provider said   This Rx has been sent:          Pt noted that his pharmacy states they have no refill of Tramadol for him.   Per chart, this has not been sent to pharmacy at this time.  Last Receipt of Rx by pharmacy was 4/14/2022:      Please advise patient once this has been sent over.  He was called this morning and told it had been sent.      Protocol Recommended Disposition:   Will route to provider.      Recommendation: Advised patient to Wait for call back from provider.  . Reviewed concerning symptoms and when to call back.       Fabiana Baez RN [facility] 6/13/2022 6:53 PM      COVID 19 Nurse Triage Plan/Patient Instructions    Please be aware that novel coronavirus (COVID-19) may be circulating in the community. If you develop symptoms such as fever, cough, or SOB or if you have concerns about the presence of another infection including coronavirus (COVID-19), please contact your health care provider or visit [email or web address].     Disposition/Instructions    Home care recommended. Follow home care protocol based instructions.    Thank you for taking steps to prevent the spread of this virus.  o Limit your contact with others.  o Wear a simple mask to cover your cough.  o Wash your hands well and often.    Resources    [facility]: About COVID-19: [email or web address]    CDC: What to Do If You're Sick: www.cdc.gov/coronavirus/2019-ncov/about/steps-when-sick.html    CDC: Ending Home Isolation: www.cdc.gov/coronavirus/2019-ncov/hcp/disposition-in-home-patients.html     CDC: Caring for Someone: www.cdc.gov/coronavirus/2019-ncov/if-you-are-sick/care-for-someone.html     LISA: Interim Guidance for Hospital Discharge to Home:  www.health.[facility].[place].us/diseases/coronavirus/hcp/hospdischarge.pdf    [facility] clinical trials (COVID-19 research studies): clinicalaffairs.[email or web address]/umn-clinical-trials     Below are the COVID-19 hotlines at the [place] Department of Health ([facility]). Interpreters are available.   o For health questions: Call 454-942-0066 or 1-146.674.7158 (7 a.m. to 7 p.m.)  o For questions about schools and childcare: Call 996-419-4139 or 1-755.293.2677 (7 a.m. to 7 p.m.)                         Reason for Disposition    Caller requesting a CONTROLLED substance prescription refill (e.g., narcotics, ADHD medicines)    Protocols used: MEDICATION QUESTION CALL-A-

## 2022-06-13 NOTE — TELEPHONE ENCOUNTER
Routing to PCP    Patient calling for refill of tramadol, he took his last tab on Friday 6/10      Please check  for last refill.  Pt states refill in April, rx written on 4/13 ( 3 months would be June for next refill)    LAST OV 5/6  Impingement syndrome of both shoulders  Tramadol refill not due until the end of June        Saray Ann RN

## 2022-06-14 RX ORDER — TRAMADOL HYDROCHLORIDE 50 MG/1
TABLET ORAL
Qty: 90 TABLET | Refills: 0 | OUTPATIENT
Start: 2022-06-14 | End: 2024-08-08

## 2022-06-14 RX ORDER — TRAMADOL HYDROCHLORIDE 50 MG/1
TABLET ORAL
Qty: 90 TABLET | Refills: 0 | Status: SHIPPED | OUTPATIENT
Start: 2022-06-14 | End: 2022-09-13

## 2022-06-14 NOTE — TELEPHONE ENCOUNTER
Routing to PCP-     Pt calling stating pharmacy did not get refill.    Reviewed chart does not look  like refill was ever sent but refused.     Please resent rx    Rx pending approval     Noelle Dejesus RN

## 2022-06-14 NOTE — TELEPHONE ENCOUNTER
Pt advised by writer.  States he spoke with someone this morning.     Fabiana Baez RN  [facility] Triage Nurse Advisor   6/14/2022 11:51 AM

## 2022-09-13 DIAGNOSIS — M75.41 IMPINGEMENT SYNDROME OF BOTH SHOULDERS: ICD-10-CM

## 2022-09-13 DIAGNOSIS — M75.42 IMPINGEMENT SYNDROME OF BOTH SHOULDERS: ICD-10-CM

## 2022-09-13 RX ORDER — TRAMADOL HYDROCHLORIDE 50 MG/1
TABLET ORAL
Qty: 90 TABLET | Refills: 0 | Status: SHIPPED | OUTPATIENT
Start: 2022-09-13 | End: 2022-12-05

## 2022-09-13 NOTE — LETTER
[facility]  [address] [address] [phone]                                                                                                November 15, 2022    Devon DIXON Mac  [address] [address]        Dear Steven Mac,    You missed your appointment on November 11 with Dr. Magdaleno. You must schedule another visit to obtain any further refills.   At [facility] we care about your health and well-being. A review of your chart has indicated that you are due for a(n) Medicare Annual Wellness visit and Chronic Disease Management. Please contact us at [phone] to schedule your appointment.     If you have already had one or all of the above screening tests at another facility, please call us to update your chart.     You may contact the clinic at [phone] if you have any questions or concerns about this request.      Sincerely,      Your Care Team

## 2022-09-13 NOTE — TELEPHONE ENCOUNTER
Please let this patient know he needs an appointment for further refills.  I have sent in a 1 month prescription.    Nicole Magdaleno DO

## 2022-10-02 DIAGNOSIS — I10 BENIGN ESSENTIAL HYPERTENSION: ICD-10-CM

## 2022-10-03 RX ORDER — LISINOPRIL 20 MG/1
TABLET ORAL
Qty: 90 TABLET | Refills: 1 | Status: SHIPPED | OUTPATIENT
Start: 2022-10-03 | End: 2023-03-21

## 2022-10-14 ENCOUNTER — OFFICE VISIT (OUTPATIENT)
Dept: URGENT CARE | Facility: URGENT CARE | Age: 72
End: 2022-10-14
Payer: COMMERCIAL

## 2022-10-14 VITALS
WEIGHT: 132.9 LBS | HEART RATE: 105 BPM | OXYGEN SATURATION: 99 % | DIASTOLIC BLOOD PRESSURE: 65 MMHG | TEMPERATURE: 98 F | SYSTOLIC BLOOD PRESSURE: 130 MMHG | BODY MASS INDEX: 22.69 KG/M2 | HEIGHT: 64 IN

## 2022-10-14 DIAGNOSIS — J02.9 SORE THROAT: ICD-10-CM

## 2022-10-14 DIAGNOSIS — N18.30 STAGE 3 CHRONIC KIDNEY DISEASE, UNSPECIFIED WHETHER STAGE 3A OR 3B CKD (H): ICD-10-CM

## 2022-10-14 DIAGNOSIS — E11.22 TYPE 2 DIABETES MELLITUS WITH STAGE 3 CHRONIC KIDNEY DISEASE, WITHOUT LONG-TERM CURRENT USE OF INSULIN, UNSPECIFIED WHETHER STAGE 3A OR 3B CKD (H): ICD-10-CM

## 2022-10-14 DIAGNOSIS — J06.9 VIRAL UPPER RESPIRATORY TRACT INFECTION: Primary | ICD-10-CM

## 2022-10-14 DIAGNOSIS — N18.30 TYPE 2 DIABETES MELLITUS WITH STAGE 3 CHRONIC KIDNEY DISEASE, WITHOUT LONG-TERM CURRENT USE OF INSULIN, UNSPECIFIED WHETHER STAGE 3A OR 3B CKD (H): ICD-10-CM

## 2022-10-14 LAB
DEPRECATED S PYO AG THROAT QL EIA: NEGATIVE
GROUP A STREP BY PCR: NOT DETECTED

## 2022-10-14 PROCEDURE — 87651 STREP A DNA AMP PROBE: CPT | Performed by: PHYSICIAN ASSISTANT

## 2022-10-14 PROCEDURE — 99214 OFFICE O/P EST MOD 30 MIN: CPT | Performed by: PHYSICIAN ASSISTANT

## 2022-10-14 ASSESSMENT — ENCOUNTER SYMPTOMS
DYSURIA: 0
SORE THROAT: 1
SINUS PAIN: 0
GASTROINTESTINAL NEGATIVE: 1
CHILLS: 0
MYALGIAS: 0
DIARRHEA: 0
ALLERGIC/IMMUNOLOGIC NEGATIVE: 1
NAUSEA: 0
RHINORRHEA: 1
CHEST TIGHTNESS: 0
MUSCULOSKELETAL NEGATIVE: 1
SHORTNESS OF BREATH: 0
FREQUENCY: 0
CARDIOVASCULAR NEGATIVE: 1
HEMATURIA: 0
NEUROLOGICAL NEGATIVE: 1
COUGH: 1
SINUS PRESSURE: 0
FEVER: 0
VOMITING: 0
HEADACHES: 0
WHEEZING: 0
PALPITATIONS: 0
ABDOMINAL PAIN: 0
CONSTITUTIONAL NEGATIVE: 1

## 2022-10-14 NOTE — PROGRESS NOTES
"Chief Complaint:     Chief Complaint   Patient presents with     Cough     Pharyngitis     Running Nose       Results for orders placed or performed in visit on 10/14/22   Streptococcus A Rapid Screen w/Reflex to PCR - Clinic Collect     Status: Normal    Specimen: Throat; Swab   Result Value Ref Range    Group A Strep antigen Negative Negative       Medical Decision Making:    Vital signs reviewed by Ezio Gee PA-C  /65 (BP Location: Right arm, Patient Position: Sitting, Cuff Size: Adult Regular)   Pulse 105   Temp 98  F (36.7  C) (Tympanic)   Ht 1.626 m (5' 4\")   Wt 60.3 kg (132 lb 14.4 oz)   SpO2 99%   BMI 22.81 kg/m      Differential Diagnosis:  Viral upper respiratory illness, Bronchitis-viral, Influenza, Pneumonia, Sinusitis, Strep pharyngitis, Tonsilitis, Viral pharyngitis, Viral syndrome        ASSESSMENT    1. Viral upper respiratory tract infection    2. Sore throat    3. Type 2 diabetes mellitus with stage 3 chronic kidney disease, without long-term current use of insulin, unspecified whether stage 3a or 3b CKD (H)    4. Stage 3 chronic kidney disease, unspecified whether stage 3a or 3b CKD (H)        PLAN    Patient is in no acute distress.    Temp is 98 in clinic today, lung sounds were clear, and O2 sats at 99% on RA.    RST was negative.  We will call with PCR results only if positive.  Rest, Push fluids, vaporizer, elevation of head of bed.  Tylenol for any fever or body aches.  NSAIDS contraindicated with CKD.  Over the counter cough suppressant- PRN- as discussed.   Patient instructed to monitor blood sugars closely with illness.  Continue taking your Metformin and Glipizide and follow up with PCP for any DM medication changes if needed.  If symptoms worsen, recheck immediately otherwise follow up with your PCP in 1 week if symptoms are not improving.  Worrisome symptoms discussed with instructions to go to the ED.  Patient verbalized understanding and agreed with this " "plan.    Labs:    Results for orders placed or performed in visit on 10/14/22   Streptococcus A Rapid Screen w/Reflex to PCR - Clinic Collect     Status: Normal    Specimen: Throat; Swab   Result Value Ref Range    Group A Strep antigen Negative Negative        Vital signs reviewed by Ezio Gee PA-C  /65 (BP Location: Right arm, Patient Position: Sitting, Cuff Size: Adult Regular)   Pulse 105   Temp 98  F (36.7  C) (Tympanic)   Ht 1.626 m (5' 4\")   Wt 60.3 kg (132 lb 14.4 oz)   SpO2 99%   BMI 22.81 kg/m      Current Meds      Current Outpatient Medications:      Acetaminophen (TYLENOL PO), , Disp: , Rfl:      aspirin 81 MG tablet, Take 81 mg by mouth daily, Disp: , Rfl:      atorvastatin (LIPITOR) 10 MG tablet, Take 1 tablet (10 mg) by mouth daily, Disp: 90 tablet, Rfl: 3     glipiZIDE (GLUCOTROL XL) 5 MG 24 hr tablet, Take 1 tablet (5 mg) by mouth daily, Disp: 90 tablet, Rfl: 1     lidocaine (LIDODERM) 5 % patch, Place 1 patch onto the skin every 24 hours To prevent lidocaine toxicity, patient should be patch free for 12 hrs daily., Disp: 15 patch, Rfl: 0     lisinopril (ZESTRIL) 20 MG tablet, TAKE 1 TABLET(20 MG) BY MOUTH DAILY, Disp: 90 tablet, Rfl: 1     metFORMIN (GLUCOPHAGE) 1000 MG tablet, Take 1 tablet (1,000 mg) by mouth 2 times daily (with meals) Do not refill until contacted by the patient, Disp: 180 tablet, Rfl: 0     traMADol (ULTRAM) 50 MG tablet, TAKE 1 TABLET(50 MG) BY MOUTH EVERY 6 HOURS AS NEEDED FOR SEVERE PAIN, Disp: 90 tablet, Rfl: 0      Respiratory History    no history of pneumonia or bronchitis      SUBJECTIVE    HPI: Devon Watts is an 71 year old male who presents with dry cough , fatigue and sore throat.  Symptoms began 2  days ago and has unchanged. Treatments tried include tylenol and OTC cold medicine. There is no shortness of breath, wheezing, nausea or vomiting.  Patient is eating and drinking well.  No fever, diarrhea.    COVID testing this week was " negative.    ROS:     Review of Systems   Constitutional: Negative.  Negative for chills and fever.   HENT: Positive for congestion, rhinorrhea and sore throat. Negative for ear discharge, ear pain, sinus pressure, sinus pain and sneezing.    Respiratory: Positive for cough. Negative for chest tightness, shortness of breath and wheezing.    Cardiovascular: Negative.  Negative for chest pain and palpitations.   Gastrointestinal: Negative.  Negative for abdominal pain, diarrhea, nausea and vomiting.   Genitourinary: Negative for dysuria, frequency, hematuria and urgency.   Musculoskeletal: Negative.  Negative for myalgias.   Skin: Negative for rash.   Allergic/Immunologic: Negative.  Negative for immunocompromised state.   Neurological: Negative.  Negative for headaches.         Family History   Family History   Problem Relation Age of Onset     Hypertension Son      Diabetes Son      Breast Cancer No family hx of      Colon Cancer No family hx of      Prostate Cancer No family hx of      Other Cancer No family hx of         Problem history  Patient Active Problem List   Diagnosis     Hyperlipidemia with target LDL less than 100     Benign essential hypertension     Impingement syndrome of both shoulders     CKD (chronic kidney disease) stage 3, GFR 30-59 ml/min (H)     Chronic, continuous use of opioids     Type 2 diabetes mellitus with stage 3 chronic kidney disease, without long-term current use of insulin, unspecified whether stage 3a or 3b CKD (H)     HTN (hypertension)     Hypothyroidism     Mantoux: positive        Allergies  No Known Allergies     Social History  Social History     Socioeconomic History     Marital status:      Spouse name: Not on file     Number of children: Not on file     Years of education: Not on file     Highest education level: Not on file   Occupational History     Not on file   Tobacco Use     Smoking status: Never     Smokeless tobacco: Never   Vaping Use     Vaping Use: Never  "used   Substance and Sexual Activity     Alcohol use: No     Drug use: No     Sexual activity: Yes     Partners: Female   Other Topics Concern     Parent/sibling w/ CABG, MI or angioplasty before 65F 55M? No   Social History Narrative     Not on file     Social Determinants of Health     Financial Resource Strain: Not on file   Food Insecurity: Not on file   Transportation Needs: Not on file   Physical Activity: Not on file   Stress: Not on file   Social Connections: Not on file   Intimate Partner Violence: Not on file   Housing Stability: Not on file        OBJECTIVE     Vital signs reviewed by Ezio Gee PA-C  /65 (BP Location: Right arm, Patient Position: Sitting, Cuff Size: Adult Regular)   Pulse 105   Temp 98  F (36.7  C) (Tympanic)   Ht 1.626 m (5' 4\")   Wt 60.3 kg (132 lb 14.4 oz)   SpO2 99%   BMI 22.81 kg/m       Physical Exam  Vitals reviewed.   Constitutional:       General: He is not in acute distress.     Appearance: He is well-developed. He is not ill-appearing, toxic-appearing or diaphoretic.   HENT:      Head: Normocephalic and atraumatic.      Right Ear: Hearing and external ear normal. No drainage, swelling or tenderness. Tympanic membrane is not perforated, erythematous, retracted or bulging.      Left Ear: Hearing and external ear normal. No drainage, swelling or tenderness. Tympanic membrane is not perforated, erythematous, retracted or bulging.      Nose: Congestion present. No nasal tenderness, mucosal edema or rhinorrhea.      Right Turbinates: Not enlarged or swollen.      Left Turbinates: Not enlarged or swollen.      Right Sinus: No maxillary sinus tenderness or frontal sinus tenderness.      Left Sinus: No maxillary sinus tenderness or frontal sinus tenderness.      Mouth/Throat:      Pharynx: Posterior oropharyngeal erythema present. No pharyngeal swelling, oropharyngeal exudate or uvula swelling.      Tonsils: No tonsillar exudate. 0 on the right. 0 on the left.   Eyes: "      General: Lids are normal. No scleral icterus.        Right eye: No discharge.         Left eye: No discharge.      Extraocular Movements: Extraocular movements intact.      Conjunctiva/sclera: Conjunctivae normal.      Right eye: Right conjunctiva is not injected. No exudate.     Left eye: Left conjunctiva is not injected. No exudate.  Cardiovascular:      Rate and Rhythm: Normal rate and regular rhythm.      Heart sounds: Normal heart sounds.   Pulmonary:      Effort: Pulmonary effort is normal. No accessory muscle usage, respiratory distress or retractions.      Breath sounds: Normal breath sounds and air entry. No stridor, decreased air movement or transmitted upper airway sounds. No decreased breath sounds, wheezing, rhonchi or rales.   Chest:      Chest wall: No tenderness.   Abdominal:      General: Abdomen is flat.      Palpations: Abdomen is not rigid.      Tenderness: There is no guarding.   Musculoskeletal:         General: Normal range of motion.      Cervical back: Normal range of motion and neck supple. No rigidity.   Lymphadenopathy:      Head:      Right side of head: No submental, submandibular, tonsillar, preauricular or posterior auricular adenopathy.      Left side of head: No submental, submandibular, tonsillar, preauricular or posterior auricular adenopathy.      Cervical:      Right cervical: No superficial or posterior cervical adenopathy.     Left cervical: No superficial or posterior cervical adenopathy.   Skin:     General: Skin is warm.      Capillary Refill: Capillary refill takes less than 2 seconds.   Neurological:      General: No focal deficit present.      Mental Status: He is alert and oriented to person, place, and time.      Motor: No abnormal muscle tone.   Psychiatric:         Mood and Affect: Mood normal.         Behavior: Behavior normal. Behavior is cooperative.         Thought Content: Thought content normal.         Judgment: Judgment normal.         Hayder Mueller  CATRINA Gee PA-C  10/14/2022, 3:09 PM

## 2022-11-15 NOTE — TELEPHONE ENCOUNTER
Patient no showed 11/11 visit.   Sent letter reminding of need for appointment to get any further refills.     Ingrid King MA

## 2022-12-05 DIAGNOSIS — M75.41 IMPINGEMENT SYNDROME OF BOTH SHOULDERS: ICD-10-CM

## 2022-12-05 DIAGNOSIS — M75.42 IMPINGEMENT SYNDROME OF BOTH SHOULDERS: ICD-10-CM

## 2022-12-05 RX ORDER — TRAMADOL HYDROCHLORIDE 50 MG/1
50 TABLET ORAL EVERY 6 HOURS PRN
Qty: 30 TABLET | Refills: 0 | Status: SHIPPED | OUTPATIENT
Start: 2022-12-05 | End: 2022-12-29

## 2022-12-05 NOTE — TELEPHONE ENCOUNTER
[facility]rey faxed a Controlled Substance Refill Request    traMADol (ULTRAM) 50 MG tablet      Last Written Prescription Date:  9/13/2022  Last Fill Quantity: 90 tablet,   # refills: 0  Last Office Visit: 5/6/2022  SHANITA Erazo    Future Office visit:       Routing refill request to provider for review/approval because:  Drug not on the [facility], [facility] or [facility] refill protocol or controlled substance

## 2022-12-26 DIAGNOSIS — M75.42 IMPINGEMENT SYNDROME OF BOTH SHOULDERS: ICD-10-CM

## 2022-12-26 DIAGNOSIS — E11.22 TYPE 2 DIABETES MELLITUS WITH STAGE 3 CHRONIC KIDNEY DISEASE, WITHOUT LONG-TERM CURRENT USE OF INSULIN, UNSPECIFIED WHETHER STAGE 3A OR 3B CKD (H): ICD-10-CM

## 2022-12-26 DIAGNOSIS — M75.41 IMPINGEMENT SYNDROME OF BOTH SHOULDERS: ICD-10-CM

## 2022-12-26 DIAGNOSIS — N18.30 TYPE 2 DIABETES MELLITUS WITH STAGE 3 CHRONIC KIDNEY DISEASE, WITHOUT LONG-TERM CURRENT USE OF INSULIN, UNSPECIFIED WHETHER STAGE 3A OR 3B CKD (H): ICD-10-CM

## 2022-12-27 NOTE — TELEPHONE ENCOUNTER
Called and spoke with patient    Patient advised he needs appt with PCP and [facility]    Patient expressed understanding    Patient stated he couldn't schedule with me now, but he will call back to schedule when he is able    Patient advised needs an appointment for further refills    Patient expressed understanding and said he will call back    Rosetta QUEEN CMA

## 2022-12-29 RX ORDER — GLIPIZIDE 5 MG/1
5 TABLET, FILM COATED, EXTENDED RELEASE ORAL DAILY
Qty: 30 TABLET | Refills: 0 | Status: SHIPPED | OUTPATIENT
Start: 2022-12-29 | End: 2023-02-01

## 2022-12-29 RX ORDER — TRAMADOL HYDROCHLORIDE 50 MG/1
50 TABLET ORAL EVERY 6 HOURS PRN
Qty: 30 TABLET | Refills: 0 | Status: SHIPPED | OUTPATIENT
Start: 2022-12-29 | End: 2023-02-01

## 2023-01-11 ENCOUNTER — TELEPHONE (OUTPATIENT)
Dept: FAMILY MEDICINE | Facility: CLINIC | Age: 73
End: 2023-01-11

## 2023-01-11 NOTE — LETTER
January 11, 2023      Devon Watts  [address] [address]        Dear Devon,     We care about your health and have reviewed your health plan. We have reviewed your medical conditions, medication list, and lab results and are making recommendations based on this review, to better manage your health.    You are in particular need of attention regarding:  - Diabetes Care   - Scheduling an Annual Physical / Wellness Visit with your primary care provider    Here is a list of Health Maintenance topics that are due now or due soon:  Health Maintenance Due   Topic Date Due     URINALYSIS  Never done     ZOSTER IMMUNIZATION (1 of 2) Never done     AORTIC ANEURYSM SCREENING (SYSTEM ASSIGNED)  Never done     MEDICARE ANNUAL WELLNESS VISIT  03/08/2022     COVID-19 Vaccine (5 - Booster for Moderna series) 07/01/2022     COLORECTAL CANCER SCREENING  07/07/2022     DAVY ASSESSMENT  07/23/2022     URINE DRUG SCREEN  07/23/2022     ANNUAL REVIEW OF HM ORDERS  07/23/2022     PHQ-9  07/23/2022     TREATMENT AGREEMENT FOR CHRONIC PAIN MANAGEMENT  07/23/2022     A1C  08/06/2022     EYE EXAM  09/11/2022     PHQ-2 (once per calendar year)  01/01/2023     HEMOGLOBIN  02/13/2023       Please call us at [phone] (or use Nano) to address the above recommendations.     Thank you for trusting [facility] with your healthcare needs. We appreciate the opportunity to serve you and look forward to supporting you in the future.    Healthy Regards,    Your Care Team

## 2023-02-01 DIAGNOSIS — M75.42 IMPINGEMENT SYNDROME OF BOTH SHOULDERS: ICD-10-CM

## 2023-02-01 DIAGNOSIS — M75.41 IMPINGEMENT SYNDROME OF BOTH SHOULDERS: ICD-10-CM

## 2023-02-01 DIAGNOSIS — N18.30 TYPE 2 DIABETES MELLITUS WITH STAGE 3 CHRONIC KIDNEY DISEASE, WITHOUT LONG-TERM CURRENT USE OF INSULIN, UNSPECIFIED WHETHER STAGE 3A OR 3B CKD (H): ICD-10-CM

## 2023-02-01 DIAGNOSIS — E11.22 TYPE 2 DIABETES MELLITUS WITH STAGE 3 CHRONIC KIDNEY DISEASE, WITHOUT LONG-TERM CURRENT USE OF INSULIN, UNSPECIFIED WHETHER STAGE 3A OR 3B CKD (H): ICD-10-CM

## 2023-02-01 RX ORDER — TRAMADOL HYDROCHLORIDE 50 MG/1
50 TABLET ORAL EVERY 6 HOURS PRN
Qty: 30 TABLET | Refills: 0 | Status: SHIPPED | OUTPATIENT
Start: 2023-02-01 | End: 2023-02-27

## 2023-02-01 RX ORDER — GLIPIZIDE 5 MG/1
5 TABLET, FILM COATED, EXTENDED RELEASE ORAL DAILY
Qty: 30 TABLET | Refills: 0 | Status: SHIPPED | OUTPATIENT
Start: 2023-02-01 | End: 2023-02-27

## 2023-02-26 DIAGNOSIS — E11.22 TYPE 2 DIABETES MELLITUS WITH STAGE 3 CHRONIC KIDNEY DISEASE, WITHOUT LONG-TERM CURRENT USE OF INSULIN, UNSPECIFIED WHETHER STAGE 3A OR 3B CKD (H): ICD-10-CM

## 2023-02-26 DIAGNOSIS — M75.41 IMPINGEMENT SYNDROME OF BOTH SHOULDERS: ICD-10-CM

## 2023-02-26 DIAGNOSIS — M75.42 IMPINGEMENT SYNDROME OF BOTH SHOULDERS: ICD-10-CM

## 2023-02-26 DIAGNOSIS — N18.30 TYPE 2 DIABETES MELLITUS WITH STAGE 3 CHRONIC KIDNEY DISEASE, WITHOUT LONG-TERM CURRENT USE OF INSULIN, UNSPECIFIED WHETHER STAGE 3A OR 3B CKD (H): ICD-10-CM

## 2023-02-27 RX ORDER — TRAMADOL HYDROCHLORIDE 50 MG/1
TABLET ORAL
Qty: 30 TABLET | Refills: 0 | Status: SHIPPED | OUTPATIENT
Start: 2023-02-27 | End: 2023-03-21

## 2023-02-27 RX ORDER — GLIPIZIDE 5 MG/1
TABLET, FILM COATED, EXTENDED RELEASE ORAL
Qty: 90 TABLET | Refills: 0 | Status: SHIPPED | OUTPATIENT
Start: 2023-02-27 | End: 2023-03-21

## 2023-03-21 ENCOUNTER — OFFICE VISIT (OUTPATIENT)
Dept: FAMILY MEDICINE | Facility: CLINIC | Age: 73
End: 2023-03-21
Payer: COMMERCIAL

## 2023-03-21 VITALS
HEIGHT: 64 IN | TEMPERATURE: 98.5 F | WEIGHT: 135.6 LBS | HEART RATE: 98 BPM | OXYGEN SATURATION: 100 % | SYSTOLIC BLOOD PRESSURE: 130 MMHG | BODY MASS INDEX: 23.15 KG/M2 | DIASTOLIC BLOOD PRESSURE: 76 MMHG | RESPIRATION RATE: 16 BRPM

## 2023-03-21 DIAGNOSIS — M75.42 IMPINGEMENT SYNDROME OF BOTH SHOULDERS: ICD-10-CM

## 2023-03-21 DIAGNOSIS — N18.30 TYPE 2 DIABETES MELLITUS WITH STAGE 3 CHRONIC KIDNEY DISEASE, WITHOUT LONG-TERM CURRENT USE OF INSULIN, UNSPECIFIED WHETHER STAGE 3A OR 3B CKD (H): ICD-10-CM

## 2023-03-21 DIAGNOSIS — E78.5 HYPERLIPIDEMIA LDL GOAL <70: ICD-10-CM

## 2023-03-21 DIAGNOSIS — M75.41 IMPINGEMENT SYNDROME OF BOTH SHOULDERS: ICD-10-CM

## 2023-03-21 DIAGNOSIS — F11.90 CHRONIC, CONTINUOUS USE OF OPIOIDS: ICD-10-CM

## 2023-03-21 DIAGNOSIS — Z00.00 ENCOUNTER FOR MEDICARE ANNUAL WELLNESS EXAM: Primary | ICD-10-CM

## 2023-03-21 DIAGNOSIS — I10 BENIGN ESSENTIAL HYPERTENSION: ICD-10-CM

## 2023-03-21 DIAGNOSIS — Z79.899 ENCOUNTER FOR LONG-TERM (CURRENT) USE OF MEDICATIONS: ICD-10-CM

## 2023-03-21 DIAGNOSIS — Z12.11 SCREEN FOR COLON CANCER: ICD-10-CM

## 2023-03-21 DIAGNOSIS — E11.22 TYPE 2 DIABETES MELLITUS WITH STAGE 3 CHRONIC KIDNEY DISEASE, WITHOUT LONG-TERM CURRENT USE OF INSULIN, UNSPECIFIED WHETHER STAGE 3A OR 3B CKD (H): ICD-10-CM

## 2023-03-21 LAB
ALBUMIN UR-MCNC: NEGATIVE MG/DL
ANION GAP SERPL CALCULATED.3IONS-SCNC: 13 MMOL/L (ref 7–15)
APPEARANCE UR: CLEAR
BILIRUB UR QL STRIP: NEGATIVE
BUN SERPL-MCNC: 17.7 MG/DL (ref 8–23)
CALCIUM SERPL-MCNC: 9.7 MG/DL (ref 8.8–10.2)
CHLORIDE SERPL-SCNC: 106 MMOL/L (ref 98–107)
CHOLEST SERPL-MCNC: 155 MG/DL
COLOR UR AUTO: YELLOW
CREAT SERPL-MCNC: 1.48 MG/DL (ref 0.67–1.17)
CREAT UR-MCNC: 100 MG/DL
DEPRECATED HCO3 PLAS-SCNC: 24 MMOL/L (ref 22–29)
GFR SERPL CREATININE-BSD FRML MDRD: 50 ML/MIN/1.73M2
GLUCOSE SERPL-MCNC: 131 MG/DL (ref 70–99)
GLUCOSE UR STRIP-MCNC: NEGATIVE MG/DL
HBA1C MFR BLD: 7.3 % (ref 0–5.6)
HDLC SERPL-MCNC: 53 MG/DL
HGB BLD-MCNC: 11.6 G/DL (ref 13.3–17.7)
HGB UR QL STRIP: NEGATIVE
KETONES UR STRIP-MCNC: NEGATIVE MG/DL
LDLC SERPL CALC-MCNC: 88 MG/DL
LEUKOCYTE ESTERASE UR QL STRIP: NEGATIVE
MICROALBUMIN UR-MCNC: <12 MG/L
MICROALBUMIN/CREAT UR: NORMAL MG/G{CREAT}
NITRATE UR QL: NEGATIVE
NONHDLC SERPL-MCNC: 102 MG/DL
PH UR STRIP: 7 [PH] (ref 5–7)
POTASSIUM SERPL-SCNC: 5 MMOL/L (ref 3.4–5.3)
SODIUM SERPL-SCNC: 143 MMOL/L (ref 136–145)
SP GR UR STRIP: 1.01 (ref 1–1.03)
TRIGL SERPL-MCNC: 69 MG/DL
UROBILINOGEN UR STRIP-ACNC: 0.2 E.U./DL

## 2023-03-21 PROCEDURE — 82043 UR ALBUMIN QUANTITATIVE: CPT | Performed by: FAMILY MEDICINE

## 2023-03-21 PROCEDURE — 82570 ASSAY OF URINE CREATININE: CPT | Performed by: FAMILY MEDICINE

## 2023-03-21 PROCEDURE — 81003 URINALYSIS AUTO W/O SCOPE: CPT | Performed by: FAMILY MEDICINE

## 2023-03-21 PROCEDURE — 80048 BASIC METABOLIC PNL TOTAL CA: CPT | Performed by: FAMILY MEDICINE

## 2023-03-21 PROCEDURE — 83036 HEMOGLOBIN GLYCOSYLATED A1C: CPT | Performed by: FAMILY MEDICINE

## 2023-03-21 PROCEDURE — 85018 HEMOGLOBIN: CPT | Performed by: FAMILY MEDICINE

## 2023-03-21 PROCEDURE — 99214 OFFICE O/P EST MOD 30 MIN: CPT | Mod: 25 | Performed by: FAMILY MEDICINE

## 2023-03-21 PROCEDURE — G0438 PPPS, INITIAL VISIT: HCPCS | Performed by: FAMILY MEDICINE

## 2023-03-21 PROCEDURE — 36415 COLL VENOUS BLD VENIPUNCTURE: CPT | Performed by: FAMILY MEDICINE

## 2023-03-21 PROCEDURE — 80061 LIPID PANEL: CPT | Performed by: FAMILY MEDICINE

## 2023-03-21 PROCEDURE — 2894A URINE DRUG CONFIRMATION PANEL: CPT | Performed by: FAMILY MEDICINE

## 2023-03-21 PROCEDURE — 2894A VOIDCORRECT: CPT | Performed by: FAMILY MEDICINE

## 2023-03-21 PROCEDURE — 90471 IMMUNIZATION ADMIN: CPT | Performed by: FAMILY MEDICINE

## 2023-03-21 PROCEDURE — 90750 HZV VACC RECOMBINANT IM: CPT | Performed by: FAMILY MEDICINE

## 2023-03-21 RX ORDER — TRAMADOL HYDROCHLORIDE 50 MG/1
50 TABLET ORAL EVERY 6 HOURS PRN
Qty: 90 TABLET | Refills: 0 | Status: SHIPPED | OUTPATIENT
Start: 2023-03-21 | End: 2023-05-25

## 2023-03-21 RX ORDER — GLIPIZIDE 5 MG/1
5 TABLET, FILM COATED, EXTENDED RELEASE ORAL DAILY
Qty: 90 TABLET | Refills: 1 | Status: SHIPPED | OUTPATIENT
Start: 2023-03-21 | End: 2023-09-18

## 2023-03-21 RX ORDER — LISINOPRIL 20 MG/1
20 TABLET ORAL DAILY
Qty: 90 TABLET | Refills: 1 | Status: SHIPPED | OUTPATIENT
Start: 2023-03-21 | End: 2023-09-18

## 2023-03-21 RX ORDER — ATORVASTATIN CALCIUM 10 MG/1
10 TABLET, FILM COATED ORAL DAILY
Qty: 90 TABLET | Refills: 3 | Status: SHIPPED | OUTPATIENT
Start: 2023-03-21 | End: 2024-04-08

## 2023-03-21 ASSESSMENT — ENCOUNTER SYMPTOMS
DIZZINESS: 0
FEVER: 0
HEMATOCHEZIA: 0
FREQUENCY: 0
HEARTBURN: 0
COUGH: 0
ABDOMINAL PAIN: 0
HEADACHES: 0
HEMATURIA: 0
DIARRHEA: 0
CONSTIPATION: 0
CHILLS: 0
ARTHRALGIAS: 0
EYE PAIN: 0
NERVOUS/ANXIOUS: 0

## 2023-03-21 ASSESSMENT — ANXIETY QUESTIONNAIRES
GAD7 TOTAL SCORE: 0
2. NOT BEING ABLE TO STOP OR CONTROL WORRYING: NOT AT ALL
7. FEELING AFRAID AS IF SOMETHING AWFUL MIGHT HAPPEN: NOT AT ALL
GAD7 TOTAL SCORE: 0
GAD7 TOTAL SCORE: 0
3. WORRYING TOO MUCH ABOUT DIFFERENT THINGS: NOT AT ALL
8. IF YOU CHECKED OFF ANY PROBLEMS, HOW DIFFICULT HAVE THESE MADE IT FOR YOU TO DO YOUR WORK, TAKE CARE OF THINGS AT HOME, OR GET ALONG WITH OTHER PEOPLE?: NOT DIFFICULT AT ALL
7. FEELING AFRAID AS IF SOMETHING AWFUL MIGHT HAPPEN: NOT AT ALL
4. TROUBLE RELAXING: NOT AT ALL
6. BECOMING EASILY ANNOYED OR IRRITABLE: NOT AT ALL
1. FEELING NERVOUS, ANXIOUS, OR ON EDGE: NOT AT ALL
5. BEING SO RESTLESS THAT IT IS HARD TO SIT STILL: NOT AT ALL
IF YOU CHECKED OFF ANY PROBLEMS ON THIS QUESTIONNAIRE, HOW DIFFICULT HAVE THESE PROBLEMS MADE IT FOR YOU TO DO YOUR WORK, TAKE CARE OF THINGS AT HOME, OR GET ALONG WITH OTHER PEOPLE: NOT DIFFICULT AT ALL

## 2023-03-21 ASSESSMENT — ACTIVITIES OF DAILY LIVING (ADL): CURRENT_FUNCTION: NO ASSISTANCE NEEDED

## 2023-03-21 ASSESSMENT — PATIENT HEALTH QUESTIONNAIRE - PHQ9
SUM OF ALL RESPONSES TO PHQ QUESTIONS 1-9: 0
SUM OF ALL RESPONSES TO PHQ QUESTIONS 1-9: 0
10. IF YOU CHECKED OFF ANY PROBLEMS, HOW DIFFICULT HAVE THESE PROBLEMS MADE IT FOR YOU TO DO YOUR WORK, TAKE CARE OF THINGS AT HOME, OR GET ALONG WITH OTHER PEOPLE: NOT DIFFICULT AT ALL

## 2023-03-21 ASSESSMENT — PAIN SCALES - GENERAL: PAINLEVEL: SEVERE PAIN (6)

## 2023-03-21 NOTE — LETTER
March 23, 2023      Devon Watts  [address] [facility] [address]        Ankita Osorio,     Thank you for getting your labs done.  Your labs showed you are still a little anemic.  This has been an ongoing issue for you.  It looks like you are overdue for your colonoscopy.  I would like you to get this to make sure you are not bleeding from your GI tract.  Your kidney function is slightly reduced also.  Try and drink 2 L of water a day.   Feel free to send a message or call if you have any questions.     Take care,     Nicole Magdaleno D.O.       Resulted Orders   Basic metabolic panel  (Ca, Cl, CO2, Creat, Gluc, K, Na, BUN)   Result Value Ref Range    Sodium 143 136 - 145 mmol/L    Potassium 5.0 3.4 - 5.3 mmol/L    Chloride 106 98 - 107 mmol/L    Carbon Dioxide (CO2) 24 22 - 29 mmol/L    Anion Gap 13 7 - 15 mmol/L    Urea Nitrogen 17.7 8.0 - 23.0 mg/dL    Creatinine 1.48 (H) 0.67 - 1.17 mg/dL    Calcium 9.7 8.8 - 10.2 mg/dL    Glucose 131 (H) 70 - 99 mg/dL    GFR Estimate 50 (L) >60 mL/min/1.73m2      Comment:      eGFR calculated using 2021 CKD-EPI equation.   Albumin Random Urine Quantitative with Creat Ratio   Result Value Ref Range    Creatinine Urine mg/dL 100.0 mg/dL      Comment:      The reference ranges have not been established in urine creatinine. The results should be integrated into the clinical context for interpretation.    Albumin Urine mg/L <12.0 mg/L      Comment:      The reference ranges have not been established in urine albumin. The results should be integrated into the clinical context for interpretation.    Albumin Urine mg/g Cr        Comment:      Unable to calculate, urine albumin and/or urine creatinine is outside detectable limits.  Microalbuminuria is defined as an albumin:creatinine ratio of 17 to 299 for males and 25 to 299 for females. A ratio of albumin:creatinine of 300 or higher is indicative of overt proteinuria.  Due to biologic variability, positive results should be  confirmed by a second, first-morning random or 24-hour timed urine specimen. If there is discrepancy, a third specimen is recommended. When 2 out of 3 results are in the microalbuminuria range, this is evidence for incipient nephropathy and warrants increased efforts at glucose control, blood pressure control, and institution of therapy with an angiotensin-converting-enzyme (ACE) inhibitor (if the patient can tolerate it).     HEMOGLOBIN A1C   Result Value Ref Range    Hemoglobin A1C 7.3 (H) 0.0 - 5.6 %      Comment:      Normal <5.7%   Prediabetes 5.7-6.4%    Diabetes 6.5% or higher     Note: Adopted from ADA consensus guidelines.   UA reflex to Microscopic and Culture ([id number])   Result Value Ref Range    Color Urine Yellow Colorless, Straw, Light Yellow, Yellow    Appearance Urine Clear Clear    Glucose Urine Negative Negative mg/dL    Bilirubin Urine Negative Negative    Ketones Urine Negative Negative mg/dL    Specific Gravity Urine 1.015 1.003 - 1.035    Blood Urine Negative Negative    pH Urine 7.0 5.0 - 7.0    Protein Albumin Urine Negative Negative mg/dL    Urobilinogen Urine 0.2 0.2, 1.0 E.U./dL    Nitrite Urine Negative Negative    Leukocyte Esterase Urine Negative Negative    Narrative    Microscopic not indicated   Urine Drug Confirmation Panel   Result Value Ref Range    O-Teodoro-Tramadol ng/mL >13,000 (H) <50 ng/mL    O-Teodoro-Tramadol        Comment:      Unable to calculate: Result value above analytical measurement range    Tramadol ng/mL 4,940 (H) <50 ng/mL    Tramadol 5,041 Absent ng/mg [creat]      Comment:      Sources of tramadol are scheduled prescription medications.    Narrative    This test was developed and its performance characteristics determined by the [facility],  Special Chemistry Laboratory. It has not been cleared or approved by the FDA. The laboratory is regulated under CLIA as qualified to perform high-complexity testing. This test is used for clinical  purposes. It should not be regarded as investigational or for research.    Drugs with concentrations less than the cutoff will not be reported.  The drugs with applicable detection cutoff limits that are included within the Drug Confirmation Panel are:    The following drugs are detected with a cutoff of 3 ng/ml: FENTANYL    The following drugs are detected with a cutoff of 5 ng/mL: 6-ACETYLMORPHINE, BUPRENORPHINE, NALOXONE, NORBUPRENORPHINE, NORFENTANYL    The following drugs are detected with a cutoff of 10 ng/mL: SUFENTANIL    The following drugs are detected with a cutoff of 20 ng/mL: PCP (PHENCYCLIDINE)    The following drugs are detected with a cutoff of 50 ng/mL: 7-AMINOCLONAZEPAM, 7-AMINOFLUNITRAZEPAM, ALPRAZOLAM, AMPHETAMINE, A-OH-ALPRAZOLAM, A-OH-MIDAZOLAM, A-OH-TRIAZOLAM, BENZOYLECGONINE (Cocaine Metabolite), CLONAZEPAM, COCAETHYLENE (Cocaine Metabolite), CODEINE, DIAZEPAM, DIHYDROCODEINE, EDDP (Methadone Metabolite), HYDROCODONE, HYDROMORPHONE, LORAZEPAM, MDA (3,4-Methylenedioxyamphetamine), MDEA (3,0-Umjocukrgfuxyp-C-ethylcathinone), MDMA (Methylenedioxyamphetamine,Ecstasy), MEPERIDINE, METHADONE, METHAMPHETAMINE, METHYLPHENIDATE (Ritalin), MORPHINE, NALTREXONE, N-DESMETH-TAPENTADOL, NORCODEINE, NORDIAZEPAM, NORMEPERIDINE, O-HUA-TRAMADOL, OXAZEPAM, OXYCODONE, OXYMORPHONE, PROPOXYPHENE, RITALINIC ACID, TAPENTADOL, TEMAZEPAM, THEBAINE, TRAMADOL    The following drugs are detected with a cutoff of 100 ng/mL: GABAPENTIN, KETAMINE    The following drugs are detected with a cutoff of 200 ng/mL: PREGABALIN     Urine Creatinine for Drug Screen Panel   Result Value Ref Range    Creatinine Urine for Drug Screen 98 mg/dL      Comment:      The reference range has not been established for creatinine in random urines. The results should be integrated into the clinical context for interpretation.   Hemoglobin   Result Value Ref Range    Hemoglobin 11.6 (L) 13.3 - 17.7 g/dL   Lipid panel reflex to direct LDL  Non-fasting   Result Value Ref Range    Cholesterol 155 <200 mg/dL    Triglycerides 69 <150 mg/dL    Direct Measure HDL 53 >=40 mg/dL    LDL Cholesterol Calculated 88 <=100 mg/dL    Non HDL Cholesterol 102 <130 mg/dL    Narrative    Cholesterol  Desirable:  <200 mg/dL    Triglycerides  Normal:  Less than 150 mg/dL  Borderline High:  150-199 mg/dL  High:  200-499 mg/dL  Very High:  Greater than or equal to 500 mg/dL    Direct Measure HDL  Female:  Greater than or equal to 50 mg/dL   Male:  Greater than or equal to 40 mg/dL    LDL Cholesterol  Desirable:  <100mg/dL  Above Desirable:  100-129 mg/dL   Borderline High:  130-159 mg/dL   High:  160-189 mg/dL   Very High:  >= 190 mg/dL    Non HDL Cholesterol  Desirable:  130 mg/dL  Above Desirable:  130-159 mg/dL  Borderline High:  160-189 mg/dL  High:  190-219 mg/dL  Very High:  Greater than or equal to 220 mg/dL

## 2023-03-21 NOTE — PROGRESS NOTES
"SUBJECTIVE:   Devon is a 72 year old who presents for Preventive Visit.    Patient has been advised of split billing requirements and indicates understanding: Yes  Are you in the first 12 months of your Medicare coverage?  No    Healthy Habits:     In general, how would you rate your overall health?  Good    Frequency of exercise:  2-3 days/week    Duration of exercise:  15-30 minutes    Do you usually eat at least 4 servings of fruit and vegetables a day, include whole grains    & fiber and avoid regularly eating high fat or \"junk\" foods?  Yes    Taking medications regularly:  Yes    Medication side effects:  None    Ability to successfully perform activities of daily living:  No assistance needed    Home Safety:  No safety concerns identified    Hearing Impairment:  No hearing concerns    In the past 6 months, have you been bothered by leaking of urine?  No    In general, how would you rate your overall mental or emotional health?  Good      PHQ-2 Total Score: 0    Additional concerns today:  No      Have you ever done Advance Care Planning? (For example, a Health Directive, POLST, or a discussion with a medical provider or your loved ones about your wishes): No, advance care planning information given to patient to review.  Patient declined advance care planning discussion at this time.       Fall risk  Fallen 2 or more times in the past year?: No  Any fall with injury in the past year?: No    Cognitive Screening   1) Repeat 3 items (Leader, Season, Table)    2) Clock draw: NORMAL  3) 3 item recall: Recalls 2 objects   Results: NORMAL clock, 1-2 items recalled: COGNITIVE IMPAIRMENT LESS LIKELY    Mini-CogTM Copyright BRET Glaser. Licensed by the author for use in [facility]; reprinted with permission (eryn@.[email or web address]). All rights reserved.          Reviewed and updated as needed this visit by clinical staff   Tobacco  Allergies  Meds              Reviewed and updated as needed this visit by Provider    "              Social History     Tobacco Use     Smoking status: Never     Passive exposure: Never     Smokeless tobacco: Never   Substance Use Topics     Alcohol use: No         Alcohol Use 3/21/2023   Prescreen: >3 drinks/day or >7 drinks/week? Not Applicable   No flowsheet data found.      Diabetes Follow-up    How often are you checking your blood sugar? One time daily  What time of day are you checking your blood sugars (select all that apply)?  Before meals  Have you had any blood sugars above 200?  No  Have you had any blood sugars below 70?  No    What symptoms do you notice when your blood sugar is low?  Shaky    What concerns do you have today about your diabetes? None     Do you have any of these symptoms? (Select all that apply)  No numbness or tingling in feet.  No redness, sores or blisters on feet.  No complaints of excessive thirst.  No reports of blurry vision.  No significant changes to weight.    Have you had a diabetic eye exam in the last 12 months? No     Metformin at 1000 mg twice daily and glipizide.  5 mg daily    Lab Results   Component Value Date    A1C 7.3 03/21/2023    A1C 6.9 05/06/2022    A1C 8.0 02/14/2022    A1C 6.8 07/23/2021    A1C 7.2 03/08/2021    A1C 7.2 08/04/2020    A1C 7.0 11/19/2019    A1C 6.2 01/03/2019    A1C 5.9 06/04/2018         Hyperlipidemia Follow-Up      Are you regularly taking any medication or supplement to lower your cholesterol?   Yes- atorvastatin    Are you having muscle aches or other side effects that you think could be caused by your cholesterol lowering medication?  No  LDL Cholesterol Calculated   Date Value Ref Range Status   02/13/2022 67 <=100 mg/dL Final   03/08/2021 82 <100 mg/dL Final     Comment:     Desirable:       <100 mg/dl         Hypertension Follow-up      Do you check your blood pressure regularly outside of the clinic? Yes     Are you following a low salt diet? Yes    Are your blood pressures ever more than 140 on the top number (systolic)  OR more   than 90 on the bottom number (diastolic), for example 140/90? No 120-132/ 68-72  Lisinopril 20 mg daily    BP Readings from Last 2 Encounters:   03/21/23 130/76   10/14/22 130/65     Hemoglobin A1C (%)   Date Value   03/21/2023 7.3 (H)   05/06/2022 6.9 (H)   03/08/2021 7.2 (H)   08/04/2020 7.2 (H)     LDL Cholesterol Calculated (mg/dL)   Date Value   02/13/2022 67   03/08/2021 82   11/19/2019 80       Chronic Kidney Disease Follow-up    Do you take any over the counter pain medicine?: Yes  What over the counter medicine are you taking for your pain?:  Tylenol ES      How often do you take this medicine?:  A few times a week   Creatinine   Date Value Ref Range Status   05/06/2022 1.23 0.66 - 1.25 mg/dL Final   03/08/2021 1.07 0.66 - 1.25 mg/dL Final           Pain History:  When did you first notice your pain? - Chronic Pain   Have you seen this provider for your pain in the past?   Yes   Where in your body do you have pain? Bilateral Shoulders  Are you seeing anyone else for your pain? No    PHQ-9 SCORE 7/23/2021 3/21/2023   PHQ-9 Total Score MyChart - 0   PHQ-9 Total Score 0 0   Some encounter information is confidential and restricted. Go to Review Flowsheets activity to see all data.       DAVY-7 SCORE 7/23/2021 3/21/2023   Total Score - 0 (minimal anxiety)   Total Score 0 0   Some encounter information is confidential and restricted. Go to Review Flowsheets activity to see all data.       Chronic Pain Follow Up:    Location of pain: Bilateral Shoulders  Analgesia/pain control:    - Recent changes:  No change    - Overall control: Comfortably manageable    - Current treatments: Tramadol, Tylenol ES   Adherence:     - Do you ever take more pain medicine than prescribed? No    - When did you take your last dose of pain medicine?  yesterday   Adverse effects: No   PDMP Review       Value Time User    State PDMP site checked  Yes 3/21/2023  8:50 AM Nicole Magdaleno, DO        Last CSA Agreement:   CSA -- Patient  Level:     [Media Unavailable] Controlled Substance Agreement - Opioid - Scan on 7/23/2021  4:51 PM   [Media Unavailable] Controlled Substance Agreement - Opioid - Scan on 1/3/2019  3:13 PM       Last UDS: 7/23/2021        Current providers sharing in care for this patient include:   Patient Care Team:  Nicole Magdaleno DO as PCP - General (Family Practice)  Nicole Magdaleno DO as Assigned PCP  Morgan Cotto MD as Assigned Surgical Provider  Nicole Magdaleno DO as Assigned Pain Medication Provider    The following health maintenance items are reviewed in Epic and correct as of today:  Health Maintenance   Topic Date Due     URINALYSIS  Never done     ZOSTER IMMUNIZATION (1 of 2) Never done     AORTIC ANEURYSM SCREENING (SYSTEM ASSIGNED)  Never done     MEDICARE ANNUAL WELLNESS VISIT  03/08/2022     COLORECTAL CANCER SCREENING  07/07/2022     URINE DRUG SCREEN  07/23/2022     TREATMENT AGREEMENT FOR CHRONIC PAIN MANAGEMENT  07/23/2022     EYE EXAM  09/11/2022     LIPID  02/13/2023     MICROALBUMIN  02/14/2023     HEMOGLOBIN  02/13/2023     BMP  05/06/2023     DIABETIC FOOT EXAM  05/06/2023     A1C  06/21/2023     ANNUAL REVIEW OF HM ORDERS  02/01/2024     DAVY ASSESSMENT  03/21/2024     FALL RISK ASSESSMENT  03/21/2024     PHQ-9  03/21/2024     DTAP/TDAP/TD IMMUNIZATION (3 - Td or Tdap) 11/09/2025     ADVANCE CARE PLANNING  03/21/2028     HEPATITIS C SCREENING  Completed     PHQ-2 (once per calendar year)  Completed     INFLUENZA VACCINE  Completed     Pneumococcal Vaccine: 65+ Years  Completed     COVID-19 Vaccine  Completed     IPV IMMUNIZATION  Aged Out     MENINGITIS IMMUNIZATION  Aged Out     BP Readings from Last 3 Encounters:   03/21/23 130/76   10/14/22 130/65   05/06/22 134/76    Wt Readings from Last 3 Encounters:   03/21/23 61.5 kg (135 lb 9.6 oz)   10/14/22 60.3 kg (132 lb 14.4 oz)   05/06/22 63.1 kg (139 lb 3.2 oz)               Pneumonia Vaccine:For adults 65 years or older who do not have an  "immunocompromising condition, cerebrospinal fluid leak, or cochlear implant and want to receive PPSV23 ONLY: Administer 1 dose of PPSV23. Anyone who received any doses of PPSV23 before age 65 should receive 1 final dose of the vaccine at age 65 or older. Administer this last dose at least 5 years after the prior PPSV23 dose.        Review of Systems   Constitutional: Negative for chills and fever.   HENT: Negative for congestion, ear pain and hearing loss.    Eyes: Negative for pain.   Respiratory: Negative for cough.    Cardiovascular: Negative for chest pain.   Gastrointestinal: Negative for abdominal pain, constipation, diarrhea, heartburn and hematochezia.   Genitourinary: Negative for frequency, genital sores and hematuria.   Musculoskeletal: Negative for arthralgias.   Neurological: Negative for dizziness and headaches.   Psychiatric/Behavioral: The patient is not nervous/anxious.        OBJECTIVE:   /76 (BP Location: Right arm, Patient Position: Sitting, Cuff Size: Adult Regular)   Pulse 98   Temp 98.5  F (36.9  C) (Oral)   Resp 16   Ht 1.626 m (5' 4\")   Wt 61.5 kg (135 lb 9.6 oz)   SpO2 100%   BMI 23.28 kg/m   Estimated body mass index is 23.28 kg/m  as calculated from the following:    Height as of this encounter: 1.626 m (5' 4\").    Weight as of this encounter: 61.5 kg (135 lb 9.6 oz).  Physical Exam  GENERAL: healthy, alert and no distress  EYES: Eyes grossly normal to inspection, PERRL and conjunctivae and sclerae normal  HENT: ear canals and TM's normal, nose and mouth without ulcers or lesions  NECK: no adenopathy, no asymmetry, masses, or scars and thyroid normal to palpation  RESP: lungs clear to auscultation - no rales, rhonchi or wheezes  CV: regular rate and rhythm, normal S1 S2, no S3 or S4, no murmur, click or rub, no peripheral edema and peripheral pulses strong  ABDOMEN: soft, nontender, no hepatosplenomegaly, no masses and bowel sounds normal  MS: no gross musculoskeletal defects " noted, no edema  SKIN: no suspicious lesions or rashes  NEURO: Normal strength and tone, mentation intact and speech normal  PSYCH: mentation appears normal, affect normal/bright    Diagnostic Test Results:  Labs reviewed in Epic  Results for orders placed or performed in visit on 03/21/23 (from the past 24 hour(s))   HEMOGLOBIN A1C   Result Value Ref Range    Hemoglobin A1C 7.3 (H) 0.0 - 5.6 %   [id number] - Urine Drug Confirmation Panel (Comprehensive)    Narrative    The following orders were created for panel order [id number] - Urine Drug Confirmation Panel (Comprehensive).  Procedure                               Abnormality         Status                     ---------                               -----------         ------                     Urine Drug Confirmation ...[[id number]]                      In process                 Urine Creatinine for Karlo...[[id number]]                      In process                   Please view results for these tests on the individual orders.       ASSESSMENT / PLAN:   (Z00.00) Encounter for Medicare annual wellness exam  (primary encounter diagnosis)  Comment:   Plan:     (E11.22,  N18.30) Type 2 diabetes mellitus with stage 3 chronic kidney disease, without long-term current use of insulin, unspecified whether stage 3a or 3b CKD (H)  Comment: The current medical regimen is effective;  continue present plan and medications.    Plan: UA reflex to Microscopic and Culture ([id number]),        HEMOGLOBIN A1C, Adult Eye  Referral,         Lipid panel reflex to direct LDL Non-fasting,         Albumin Random Urine Quantitative with Creat         Ratio, Hemoglobin, glipiZIDE (GLUCOTROL XL) 5         MG 24 hr tablet, metFORMIN (GLUCOPHAGE) 1000 MG        tablet            (I10) Benign essential hypertension  Comment: The current medical regimen is effective;  continue present plan and medications.    Plan: Basic metabolic panel  (Ca, Cl, CO2, Creat,         Gluc, K, Na, BUN),  lisinopril (ZESTRIL) 20 MG         tablet            (E78.5) Hyperlipidemia LDL goal <70  Comment: The current medical regimen is effective;  continue present plan and medications.    Plan: atorvastatin (LIPITOR) 10 MG tablet            (M75.41,  M75.42) Impingement syndrome of both shoulders  Comment: I talked the patient about steroid injections again today.  He will think about it.  Plan: traMADol (ULTRAM) 50 MG tablet            (F11.90) Chronic, continuous use of opioids  Comment:   Plan: [id number] - Urine Drug Confirmation Panel         (Comprehensive)            (Z79.899) Encounter for long-term (current) use of medications  Comment:   Plan:     (Z12.11) Screen for colon cancer  Comment:   Plan: Colonoscopy Screening  Referral                  COUNSELING:  Reviewed preventive health counseling, as reflected in patient instructions       Regular exercise       Healthy diet/nutrition        He reports that he has never smoked. He has never been exposed to tobacco smoke. He has never used smokeless tobacco.      Appropriate preventive services were discussed with this patient, including applicable screening as appropriate for cardiovascular disease, diabetes, osteopenia/osteoporosis, and glaucoma.  As appropriate for age/gender, discussed screening for colorectal cancer, prostate cancer, breast cancer, and cervical cancer. Checklist reviewing preventive services available has been given to the patient.    Reviewed patients plan of care and provided an AVS. The Basic Care Plan (routine screening as documented in Health Maintenance) for Devon meets the Care Plan requirement. This Care Plan has been established and reviewed with the Patient.      Nicole Magdaleno DO  [facility]    Identified Health Risks:

## 2023-03-21 NOTE — LETTER
Opioid / Opioid Plus Controlled Substance Agreement    This is an agreement between you and your provider about the safe and appropriate use of controlled substance/opioids prescribed by your care team. Controlled substances are medicines that can cause physical and mental dependence (abuse).    There are strict laws about having and using these medicines. We here at [facility] are committing to working with you in your efforts to get better. To support you in this work, we ll help you schedule regular office appointments for medicine refills. If we must cancel or change your appointment for any reason, we ll make sure you have enough medicine to last until your next appointment.     As a Provider, I will:    Listen carefully to your concerns and treat you with respect.     Recommend a treatment plan that I believe is in your best interest. This plan may involve therapies other than opioid pain medication.     Talk with you often about the possible benefits, and the risk of harm of any medicine that we prescribe for you.     Provide a plan on how to taper (discontinue or go off) using this medicine if the decision is made to stop its use.    As a Patient, I understand that opioid(s):     Are a controlled substance prescribed by my care team to help me function or work and manage my condition(s).     Are strong medicines and can cause serious side effects such as:    Drowsiness, which can seriously affect my driving ability    A lower breathing rate, enough to cause death    Harm to my thinking ability     Depression     Abuse of and addiction to this medicine    Need to be taken exactly as prescribed. Combining opioids with certain medicines or chemicals (such as illegal drugs, sedatives, sleeping pills, and benzodiazepines) can be dangerous or even fatal. If I stop opioids suddenly, I may have severe withdrawal symptoms.    Do not work for all types of pain nor for all patients. If they re not helpful, I may  be asked to stop them.        The risks, benefits and side effects of these medicine(s) were explained to me. I agree that:  1. I will take part in other treatments as advised by my care team. This may be psychiatry or counseling, physical therapy, behavioral therapy, group treatment or a referral to a specialist.     2. I will keep all my appointments. I understand that this is part of the monitoring of opioids. My care team may require an office visit for EVERY opioid/controlled substance refill. If I miss appointments or don t follow instructions, my care team may stop my medicine.    3. I will take my medicines as prescribed. I will not change the dose or schedule unless my care team tells me to. There will be no refills if I run out early.     4. I may be asked to come to the clinic and complete a urine drug test or complete a pill count at any time. If I don t give a urine sample or participate in a pill count, the care team may stop my medicine.    5. I will only receive prescriptions from this clinic for chronic pain. If I am treated by another provider for acute pain issues, I will tell them that I am taking opioid pain medication for chronic pain and that I have a treatment agreement with this provider. I will inform my [facility] care team within one business day if I am given a prescription for any pain medication by another healthcare provider. My [facility] care team can contact other providers and pharmacists about my use of any medicines.    6. It is up to me to make sure that I don t run out of my medicines on weekends or holidays. If my care team is willing to refill my opioid prescription without a visit, I must request refills only during office hours. Refills may take up to 3 business days to process. I will use one pharmacy to fill all my opioid and other controlled substance prescriptions. I will notify the clinic about any changes to my insurance or medication  availability.    7. I am responsible for my prescriptions. If the medicine/prescription is lost, stolen or destroyed, it will not be replaced. I also agree not to share controlled substance medicines with anyone.    8. I am aware I should not use any illegal or recreational drugs. I agree not to drink alcohol unless my care team says I can.       9. If I enroll in the [place] [facility] Cann[facility] program, I will tell my care team prior to my next refill.     10. I will tell my care team right away if I become pregnant, have a new medical problem treated outside of my regular clinic, or have a change in my medications.    11. I understand that this medicine can affect my thinking, judgment and reaction time. Alcohol and drugs affect the brain and body, which can affect the safety of my driving. Being under the influence of alcohol or drugs can affect my decision-making, behaviors, personal safety, and the safety of others. Driving while impaired (DWI) can occur if a person is driving, operating, or in physical control of a car, motorcycle, boat, snowmobile, ATV, motorbike, off-road vehicle, or any other motor vehicle ([place] Statute 169A.20). I understand the risk if I choose to drive or operate any vehicle or machinery.    I understand that if I do not follow any of the conditions above, my prescriptions or treatment may be stopped or changed.          Opioids  What You Need to Know    What are opioids?   Opioids are pain medicines that must be prescribed by a doctor. They are also known as narcotics.     Examples are:   1. morphine (MS Contin, Kaylah)  2. oxycodone (Oxycontin)  3. oxycodone and acetaminophen (Percocet)  4. hydrocodone and acetaminophen (Vicodin, Norco)   5. fentanyl patch (Duragesic)   6. hydromorphone (Dilaudid)   7. methadone  8. codeine (Tylenol #3)     What do opioids do well?   Opioids are best for severe short-term pain such as after a surgery or injury. They may work well for cancer pain. They may  help some people with long-lasting (chronic) pain.     What do opioids NOT do well?   Opioids never get rid of pain entirely, and they don t work well for most patients with chronic pain. Opioids don t reduce swelling, one of the causes of pain.                                    Other ways to manage chronic pain and improve function include:       Treat the health problem that may be causing pain    Anti-inflammation medicines, which reduce swelling and tenderness, such as ibuprofen (Advil, Motrin) or naproxen (Aleve)    Acetaminophen (Tylenol)    Antidepressants and anti-seizure medicines, especially for nerve pain    Topical treatments such as patches or creams    Injections or nerve blocks    Chiropractic or osteopathic treatment    Acupuncture, massage, deep breathing, meditation, visual imagery, aromatherapy    Use heat or ice at the pain site    Physical therapy     Exercise    Stop smoking    Take part in therapy       Risks and side effects     Talk to your doctor before you start or decide to keep taking opioids. Possible side effects include:      Lowering your breathing rate enough to cause death    Overdose, including death, especially if taking higher than prescribed doses    Worse depression symptoms; less pleasure in things you usually enjoy    Feeling tired or sluggish    Slower thoughts or cloudy thinking    Being more sensitive to pain over time; pain is harder to control    Trouble sleeping or restless sleep    Changes in hormone levels (for example, less testosterone)    Changes in sex drive or ability to have sex    Constipation    Unsafe driving    Itching and sweating    Dizziness    Nausea, throwing up and dry mouth    What else should I know about opioids?    Opioids may lead to dependence, tolerance, or addiction.      Dependence means that if you stop or reduce the medicine too quickly, you will have withdrawal symptoms. These include loose poop (diarrhea), jitters, flu-like symptoms,  nervousness and tremors. Dependence is not the same as addiction.                       Tolerance means needing higher doses over time to get the same effect. This may increase the chance of serious side effects.      Addiction is when people improperly use a substance that harms their body, their mind or their relations with others. Use of opiates can cause a relapse of addiction if you have a history of drug or alcohol abuse.      People who have used opioids for a long time may have a lower quality of life, worse depression, higher levels of pain and more visits to doctors.    You can overdose on opioids. Take these steps to lower your risk of overdose:    1. Recognize the signs:  Signs of overdose include decrease or loss of consciousness (blackout), slowed breathing, trouble waking up and blue lips. If someone is worried about overdose, they should call 911.    2. Talk to your doctor about Narcan (naloxone).   If you are at risk for overdose, you may be given a prescription for Narcan. This medicine very quickly reverses the effects of opioids.   If you overdose, a friend or family member can give you Narcan while waiting for the ambulance. They need to know the signs of overdose and how to give Narcan.     3. Don't use alcohol or street drugs.   Taking them with opioids can cause death.    4. Do not take any of these medicines unless your doctor says it s OK. Taking these with opioids can cause death:    Benzodiazepines, such as lorazepam (Ativan), alprazolam (Xanax) or diazepam (Valium)    Muscle relaxers, such as cyclobenzaprine (Flexeril)    Sleeping pills like zolpidem (Ambien)     Other opioids      How to keep you and other people safe while taking opioids:    1. Never share your opioids with others.  Opioid medicines are regulated by the Drug Enforcement Agency ([employer]). Selling or sharing medications is a criminal act.    2. Be sure to store opioids in a secure place, locked up if possible. Young children  can easily swallow them and overdose.    3. When you are traveling with your medicines, keep them in the original bottles. If you use a pill box, be sure you also carry a copy of your medicine list from your clinic or pharmacy.    4. Safe disposal of opioids    Most pharmacies have places to get rid of medicine, called disposal kiosks. Medicine disposal options are also available in every [facility]. Search your county and  medication disposal  to find more options. You can find more details at:  [email or web address]-[email or web address]-[email or web address]     I agree that my provider, clinic care team, and pharmacy may work with any city, state or federal law enforcement agency that investigates the misuse, sale, or other diversion of my controlled medicine. I will allow my provider to discuss my care with, or share a copy of, this agreement with any other treating provider, pharmacy or emergency room where I receive care.    I have read this agreement and have asked questions about anything I did not understand.    _______________________________________________________  Patient Signature - Devon Watts _____________________                   Date     ___  ____________________________________________________  Provider Signature Florian Magdaleno DO   _________3/21/2023  ____________                   Date     _______________________________________________________  Witness Signature (required if provider not present while patient signing)   _____________________                   Date

## 2023-03-21 NOTE — PATIENT INSTRUCTIONS
Patient Education   Personalized Prevention Plan  You are due for the preventive services outlined below.  Your care team is available to assist you in scheduling these services.  If you have already completed any of these items, please share that information with your care team to update in your medical record.  Health Maintenance Due   Topic Date Due     Urine Test  Never done     Zoster (Shingles) Vaccine (1 of 2) Never done     AORTIC ANEURYSM SCREENING (SYSTEM ASSIGNED)  Never done     Annual Wellness Visit  03/08/2022     Colorectal Cancer Screening  07/07/2022     URINE DRUG SCREEN  07/23/2022     TREATMENT AGREEMENT FOR CHRONIC PAIN MANAGEMENT  07/23/2022     Eye Exam  09/11/2022     Cholesterol Lab  02/13/2023     Kidney Microalbumin Urine Test  02/14/2023     FALL RISK ASSESSMENT  02/14/2023     Hemoglobin  02/13/2023     Preventive Health Recommendations  See your health care provider every year to    Review health changes.     Discuss preventive care.      Review your medicines if your doctor has prescribed any.    Talk with your health care provider about whether you should have a test to screen for prostate cancer (PSA).    Every 3 years, have a diabetes test (fasting glucose). If you are at risk for diabetes, you should have this test more often.    Every 5 years, have a cholesterol test. Have this test more often if you are at risk for high cholesterol or heart disease.     Every 10 years, have a colonoscopy. Or, have a yearly FIT test (stool test). These exams will check for colon cancer.    Talk to with your health care provider about screening for Abdominal Aortic Aneurysm if you have a family history of AAA or have a history of smoking.    Shots:     Get a flu shot each year.     Get a tetanus shot every 10 years.     Talk to your doctor about your pneumonia vaccines. There are now two you should receive - Pneumovax (PPSV 23) and Prevnar (PCV 13).    Talk to your pharmacist about a shingles  vaccine.     Talk to your doctor about the hepatitis B vaccine.    Nutrition:     Eat at least 5 servings of fruits and vegetables each day.     Eat whole-grain bread, whole-wheat pasta and brown rice instead of white grains and rice.     Get adequate Calcium and Vitamin D.     Lifestyle    Exercise for at least 150 minutes a week (30 minutes a day, 5 days a week). This will help you control your weight and prevent disease.     Limit alcohol to one drink per day.     No smoking.     Wear sunscreen to prevent skin cancer.     See your dentist every six months for an exam and cleaning.     See your eye doctor every 1 to 2 years to screen for conditions such as glaucoma, macular degeneration and cataracts.    Personalized Prevention Plan  You are due for the preventive services outlined below.  Your care team is available to assist you in scheduling these services.  If you have already completed any of these items, please share that information with your care team to update in your medical record.  Health Maintenance   Topic Date Due     URINALYSIS  Never done     ZOSTER IMMUNIZATION (1 of 2) Never done     AORTIC ANEURYSM SCREENING (SYSTEM ASSIGNED)  Never done     MEDICARE ANNUAL WELLNESS VISIT  03/08/2022     COLORECTAL CANCER SCREENING  07/07/2022     URINE DRUG SCREEN  07/23/2022     TREATMENT AGREEMENT FOR CHRONIC PAIN MANAGEMENT  07/23/2022     EYE EXAM  09/11/2022     LIPID  02/13/2023     MICROALBUMIN  02/14/2023     FALL RISK ASSESSMENT  02/14/2023     HEMOGLOBIN  02/13/2023     BMP  05/06/2023     DIABETIC FOOT EXAM  05/06/2023     A1C  06/21/2023     ANNUAL REVIEW OF HM ORDERS  02/01/2024     DAVY ASSESSMENT  03/21/2024     PHQ-9  03/21/2024     DTAP/TDAP/TD IMMUNIZATION (3 - Td or Tdap) 11/09/2025     ADVANCE CARE PLANNING  03/08/2026     HEPATITIS C SCREENING  Completed     PHQ-2 (once per calendar year)  Completed     INFLUENZA VACCINE  Completed     Pneumococcal Vaccine: 65+ Years  Completed     COVID-19  Vaccine  Completed     IPV IMMUNIZATION  Aged Out     MENINGITIS IMMUNIZATION  Aged Out

## 2023-03-23 LAB
CREAT UR-MCNC: 98 MG/DL
N-NORTRAMADOL/CREAT UR CFM: ABNORMAL NG/MG{CREAT}
O-NORTRAMADOL UR CFM-MCNC: ABNORMAL NG/ML
TRAMADOL CTO UR CFM-MCNC: 4940 NG/ML
TRAMADOL/CREAT UR: 5041 NG/MG {CREAT}

## 2023-05-23 DIAGNOSIS — M75.41 IMPINGEMENT SYNDROME OF BOTH SHOULDERS: ICD-10-CM

## 2023-05-23 DIAGNOSIS — M75.42 IMPINGEMENT SYNDROME OF BOTH SHOULDERS: ICD-10-CM

## 2023-05-25 RX ORDER — TRAMADOL HYDROCHLORIDE 50 MG/1
50 TABLET ORAL EVERY 6 HOURS PRN
Qty: 90 TABLET | Refills: 0 | Status: SHIPPED | OUTPATIENT
Start: 2023-05-25 | End: 2023-07-21

## 2023-06-19 ENCOUNTER — TRANSFERRED RECORDS (OUTPATIENT)
Dept: MULTI SPECIALTY CLINIC | Facility: CLINIC | Age: 73
End: 2023-06-19

## 2023-06-19 LAB — RETINOPATHY: NORMAL

## 2023-07-20 DIAGNOSIS — M75.41 IMPINGEMENT SYNDROME OF BOTH SHOULDERS: ICD-10-CM

## 2023-07-20 DIAGNOSIS — M75.42 IMPINGEMENT SYNDROME OF BOTH SHOULDERS: ICD-10-CM

## 2023-07-21 RX ORDER — TRAMADOL HYDROCHLORIDE 50 MG/1
TABLET ORAL
Qty: 90 TABLET | Refills: 0 | Status: SHIPPED | OUTPATIENT
Start: 2023-07-21 | End: 2023-09-18

## 2023-08-13 DIAGNOSIS — I10 BENIGN ESSENTIAL HYPERTENSION: ICD-10-CM

## 2023-08-14 RX ORDER — LISINOPRIL 20 MG/1
20 TABLET ORAL DAILY
Qty: 90 TABLET | Refills: 1 | OUTPATIENT
Start: 2023-08-14

## 2023-08-31 ENCOUNTER — TELEPHONE (OUTPATIENT)
Dept: FAMILY MEDICINE | Facility: CLINIC | Age: 73
End: 2023-08-31
Payer: COMMERCIAL

## 2023-08-31 ENCOUNTER — NURSE TRIAGE (OUTPATIENT)
Dept: FAMILY MEDICINE | Facility: CLINIC | Age: 73
End: 2023-08-31
Payer: COMMERCIAL

## 2023-08-31 NOTE — TELEPHONE ENCOUNTER
Called patient to try and get him scheduled with Dr Magdaleno (in an approval slot).  Left a voicemail message that we will try and call him back or he can call us.    WENDY Levine  [facility]

## 2023-08-31 NOTE — TELEPHONE ENCOUNTER
Close monitoring  Can have pcp's team look for soonest appoitn with her  Will route to team  Abdulkadir Jackson D.O.

## 2023-08-31 NOTE — TELEPHONE ENCOUNTER
RN called and spoke with patient.    RN advised to continue to monitor diabetes closely and a team member would reach out to schedule.    RN advised that patient should also eat regularly and not skip meals.     Patient verbalized understanding.    Juan Manuel Samaniego RN, BSN, PHN  [facility] [place]

## 2023-08-31 NOTE — TELEPHONE ENCOUNTER
Nurse Triage SBAR    Is this a 2nd Level Triage? NO    Situation: Patient transferred to RN via priority line. Patient is looking to schedule annual appointment but was having symptoms of dizziness and lightheadedness on Monday and Tuesday. However, yesterday symptoms improved and now today symptoms are not present. Current BG this AM was 137 and BP was 125/77.     Background: Patient is diabetic he states and noted that Monday he did not eat at all and Tuesday his BG was 85 and after having a granola bar he felt better.     Assessment: RN felt this was possibly related to lower BG and not eating much. RN advised patient to monitor BG and eat a balanced meals regularly, and stay hydrated. RN reviewed red flag symptoms with patient and when to see emergency care. Patient agreed and understood.     Protocol Recommended Disposition:   Home Care    Recommendation: Can PCP advise if she feels like patient is ok to monitor with home care? If so, patient is actually looking to schedule his annual appointment so if clinic can assist getting this scheduled for patient as well.     POLINA Truong, RN        Routed to provider    Does the patient meet one of the following criteria for ADS visit consideration? 16+ years old, with an [facility] PCP     TIP  Providers, please consider if this condition is appropriate for management at one of our Acute and Diagnostic Services sites.     If patient is a good candidate, please use dotphrase <dot>triageresponse and select Refer to ADS to document.    Reason for Disposition   Dizziness caused by not drinking enough fluids    Additional Information   Negative: SEVERE difficulty breathing (e.g., struggling for each breath, speaks in single words)   Negative: Shock suspected (e.g., cold/pale/clammy skin, too weak to stand, low BP, rapid pulse)   Negative: Difficult to awaken or acting confused (e.g., disoriented, slurred speech)   Negative: Fainted, and still feels dizzy afterwards    Negative: Overdose (accidental or intentional) of medications   Negative: New neurologic deficit that is present now: * Weakness of the face, arm, or leg on one side of the body * Numbness of the face, arm, or leg on one side of the body * Loss of speech or garbled speech   Negative: Heart beating < 50 beats per minute OR > 140 beats per minute   Negative: Sounds like a life-threatening emergency to the triager   Negative: Chest pain   Negative: Rectal bleeding, bloody stool, or tarry-black stool   Negative: Vomiting is main symptom   Negative: Diarrhea is main symptom   Negative: Headache is main symptom   Negative: Heat exhaustion suspected (i.e., dehydration from heat exposure)   Negative: Patient states that they are having an anxiety or panic attack   Negative: Dizziness from low blood sugar (i.e., < 60 mg/dl or 3.5 mmol/l)   Negative: Lightheadedness (dizziness) present now, after 2 hours of rest and fluids   Negative: Spinning or tilting sensation (vertigo) present now   Negative: Fever > 103 F (39.4 C)   Negative: Fever > 100.0 F (37.8 C) and has diabetes mellitus or a weak immune system (e.g., HIV positive, cancer chemotherapy, organ transplant, splenectomy, chronic steroids)   Negative: MODERATE dizziness (e.g., interferes with normal activities) (Exception: dizziness caused by heat exposure, sudden standing, or poor fluid intake)   Negative: Vomiting occurs with dizziness   Negative: Patient wants to be seen   Negative: SEVERE dizziness (e.g., unable to stand, requires support to walk, feels like passing out now)   Negative: SEVERE headache or neck pain   Negative: Spinning or tilting sensation (vertigo) present now and one or more stroke risk factors (i.e., hypertension, diabetes mellitus, prior stroke/TIA, heart attack, age over 60) (Exception: prior physician evaluation for this AND no different/worse than usual)   Negative: Neurologic deficit that was brief (now gone), ANY of the following:* Weakness  of the face, arm, or leg on one side of the body* Numbness of the face, arm, or leg on one side of the body* Loss of speech or garbled speech   Negative: Loss of vision or double vision  (Exception: Similar to previous migraines.)   Negative: Extra heart beats OR irregular heart beating (i.e., 'palpitations')   Negative: Difficulty breathing   Negative: Drinking very little and has signs of dehydration (e.g., no urine > 12 hours, very dry mouth, very lightheaded)   Negative: Follows bleeding (e.g., stomach, rectum, vagina)  (Exception: Became dizzy from sight of small amount blood.)   Negative: Patient sounds very sick or weak to the triager   Negative: Taking a medicine that could cause dizziness (e.g., blood pressure medications, diuretics)   Negative: MILD dizziness (e.g., walking normally) and has NOT been evaluated by physician for this (Exception: dizziness caused by heat exposure, sudden standing, or poor fluid intake)   Negative: Substance use (drug use) or unhealthy alcohol use, known or suspected   Negative: Dizziness not present now, but is a chronic symptom (recurrent or ongoing AND lasting > 4 weeks)    Protocols used: Dizziness-A-OH

## 2023-09-01 ENCOUNTER — OFFICE VISIT (OUTPATIENT)
Dept: URGENT CARE | Facility: URGENT CARE | Age: 73
End: 2023-09-01
Payer: COMMERCIAL

## 2023-09-01 VITALS
TEMPERATURE: 97.7 F | BODY MASS INDEX: 23.34 KG/M2 | RESPIRATION RATE: 18 BRPM | DIASTOLIC BLOOD PRESSURE: 67 MMHG | SYSTOLIC BLOOD PRESSURE: 130 MMHG | OXYGEN SATURATION: 100 % | HEART RATE: 91 BPM | WEIGHT: 136 LBS

## 2023-09-01 DIAGNOSIS — M62.81 GENERALIZED MUSCLE WEAKNESS: Primary | ICD-10-CM

## 2023-09-01 PROCEDURE — 99207 PR NO CHARGE LOS: CPT

## 2023-09-18 ENCOUNTER — OFFICE VISIT (OUTPATIENT)
Dept: FAMILY MEDICINE | Facility: CLINIC | Age: 73
End: 2023-09-18
Payer: COMMERCIAL

## 2023-09-18 VITALS
HEIGHT: 64 IN | RESPIRATION RATE: 16 BRPM | HEART RATE: 87 BPM | TEMPERATURE: 98.8 F | BODY MASS INDEX: 23.15 KG/M2 | WEIGHT: 135.6 LBS | OXYGEN SATURATION: 99 % | SYSTOLIC BLOOD PRESSURE: 128 MMHG | DIASTOLIC BLOOD PRESSURE: 70 MMHG

## 2023-09-18 DIAGNOSIS — Z12.11 SCREEN FOR COLON CANCER: ICD-10-CM

## 2023-09-18 DIAGNOSIS — E11.22 TYPE 2 DIABETES MELLITUS WITH STAGE 3 CHRONIC KIDNEY DISEASE, WITHOUT LONG-TERM CURRENT USE OF INSULIN, UNSPECIFIED WHETHER STAGE 3A OR 3B CKD (H): Primary | ICD-10-CM

## 2023-09-18 DIAGNOSIS — I10 BENIGN ESSENTIAL HYPERTENSION: ICD-10-CM

## 2023-09-18 DIAGNOSIS — N18.30 TYPE 2 DIABETES MELLITUS WITH STAGE 3 CHRONIC KIDNEY DISEASE, WITHOUT LONG-TERM CURRENT USE OF INSULIN, UNSPECIFIED WHETHER STAGE 3A OR 3B CKD (H): Primary | ICD-10-CM

## 2023-09-18 DIAGNOSIS — M75.41 IMPINGEMENT SYNDROME OF BOTH SHOULDERS: ICD-10-CM

## 2023-09-18 DIAGNOSIS — M75.42 IMPINGEMENT SYNDROME OF BOTH SHOULDERS: ICD-10-CM

## 2023-09-18 LAB
ANION GAP SERPL CALCULATED.3IONS-SCNC: 10 MMOL/L (ref 7–15)
BUN SERPL-MCNC: 21.9 MG/DL (ref 8–23)
CALCIUM SERPL-MCNC: 9 MG/DL (ref 8.8–10.2)
CHLORIDE SERPL-SCNC: 104 MMOL/L (ref 98–107)
CREAT SERPL-MCNC: 1.65 MG/DL (ref 0.67–1.17)
DEPRECATED HCO3 PLAS-SCNC: 26 MMOL/L (ref 22–29)
EGFRCR SERPLBLD CKD-EPI 2021: 44 ML/MIN/1.73M2
GLUCOSE SERPL-MCNC: 171 MG/DL (ref 70–99)
HBA1C MFR BLD: 6.8 % (ref 0–5.6)
POTASSIUM SERPL-SCNC: 4.8 MMOL/L (ref 3.4–5.3)
SODIUM SERPL-SCNC: 140 MMOL/L (ref 136–145)

## 2023-09-18 PROCEDURE — 99214 OFFICE O/P EST MOD 30 MIN: CPT | Mod: 25 | Performed by: FAMILY MEDICINE

## 2023-09-18 PROCEDURE — 83036 HEMOGLOBIN GLYCOSYLATED A1C: CPT | Performed by: FAMILY MEDICINE

## 2023-09-18 PROCEDURE — 36415 COLL VENOUS BLD VENIPUNCTURE: CPT | Performed by: FAMILY MEDICINE

## 2023-09-18 PROCEDURE — 90471 IMMUNIZATION ADMIN: CPT | Performed by: FAMILY MEDICINE

## 2023-09-18 PROCEDURE — 90472 IMMUNIZATION ADMIN EACH ADD: CPT | Performed by: FAMILY MEDICINE

## 2023-09-18 PROCEDURE — 80048 BASIC METABOLIC PNL TOTAL CA: CPT | Performed by: FAMILY MEDICINE

## 2023-09-18 PROCEDURE — 90662 IIV NO PRSV INCREASED AG IM: CPT | Performed by: FAMILY MEDICINE

## 2023-09-18 PROCEDURE — 99207 PR FOOT EXAM NO CHARGE: CPT | Mod: 25 | Performed by: FAMILY MEDICINE

## 2023-09-18 PROCEDURE — 90750 HZV VACC RECOMBINANT IM: CPT | Performed by: FAMILY MEDICINE

## 2023-09-18 RX ORDER — TRAMADOL HYDROCHLORIDE 50 MG/1
50 TABLET ORAL EVERY 6 HOURS PRN
Qty: 90 TABLET | Refills: 0 | Status: SHIPPED | OUTPATIENT
Start: 2023-09-18 | End: 2023-11-14

## 2023-09-18 RX ORDER — GLIPIZIDE 5 MG/1
5 TABLET, FILM COATED, EXTENDED RELEASE ORAL DAILY
Qty: 90 TABLET | Refills: 1 | Status: SHIPPED | OUTPATIENT
Start: 2023-09-18 | End: 2024-05-23

## 2023-09-18 RX ORDER — LISINOPRIL 20 MG/1
20 TABLET ORAL DAILY
Qty: 90 TABLET | Refills: 1 | Status: SHIPPED | OUTPATIENT
Start: 2023-09-18 | End: 2024-05-23

## 2023-09-18 ASSESSMENT — PATIENT HEALTH QUESTIONNAIRE - PHQ9
SUM OF ALL RESPONSES TO PHQ QUESTIONS 1-9: 1
10. IF YOU CHECKED OFF ANY PROBLEMS, HOW DIFFICULT HAVE THESE PROBLEMS MADE IT FOR YOU TO DO YOUR WORK, TAKE CARE OF THINGS AT HOME, OR GET ALONG WITH OTHER PEOPLE: NOT DIFFICULT AT ALL
SUM OF ALL RESPONSES TO PHQ QUESTIONS 1-9: 1

## 2023-09-18 ASSESSMENT — ANXIETY QUESTIONNAIRES
1. FEELING NERVOUS, ANXIOUS, OR ON EDGE: NOT AT ALL
6. BECOMING EASILY ANNOYED OR IRRITABLE: NOT AT ALL
5. BEING SO RESTLESS THAT IT IS HARD TO SIT STILL: NOT AT ALL
GAD7 TOTAL SCORE: 0
GAD7 TOTAL SCORE: 0
3. WORRYING TOO MUCH ABOUT DIFFERENT THINGS: NOT AT ALL
7. FEELING AFRAID AS IF SOMETHING AWFUL MIGHT HAPPEN: NOT AT ALL
2. NOT BEING ABLE TO STOP OR CONTROL WORRYING: NOT AT ALL
IF YOU CHECKED OFF ANY PROBLEMS ON THIS QUESTIONNAIRE, HOW DIFFICULT HAVE THESE PROBLEMS MADE IT FOR YOU TO DO YOUR WORK, TAKE CARE OF THINGS AT HOME, OR GET ALONG WITH OTHER PEOPLE: NOT DIFFICULT AT ALL
4. TROUBLE RELAXING: NOT AT ALL

## 2023-09-18 ASSESSMENT — PAIN SCALES - GENERAL: PAINLEVEL: MODERATE PAIN (5)

## 2023-09-18 NOTE — PROGRESS NOTES
Assessment & Plan     Type 2 diabetes mellitus with stage 3 chronic kidney disease, without long-term current use of insulin, unspecified whether stage 3a or 3b CKD (H)    The current medical regimen is effective;  continue present plan and medications.      - HEMOGLOBIN A1C; Future  - FOOT EXAM  - HEMOGLOBIN A1C  - metFORMIN (GLUCOPHAGE) 1000 MG tablet; Take 1 tablet (1,000 mg) by mouth 2 times daily (with meals) Do not refill until contacted by the patient  - glipiZIDE (GLUCOTROL XL) 5 MG 24 hr tablet; Take 1 tablet (5 mg) by mouth daily    Benign essential hypertension  The current medical regimen is effective;  continue present plan and medications.    - Basic metabolic panel  (Ca, Cl, CO2, Creat, Gluc, K, Na, BUN); Future  - Basic metabolic panel  (Ca, Cl, CO2, Creat, Gluc, K, Na, BUN)  - lisinopril (ZESTRIL) 20 MG tablet; Take 1 tablet (20 mg) by mouth daily    Impingement syndrome of both shoulders  Declines ICS injection     Screen for colon cancer    - Colonoscopy Screening  Referral; Future      30 minutes spent by me on the date of the encounter doing chart review, history and exam, documentation and further activities per the note     MED REC REQUIRE  Post Medication Reconciliation Status: discharge medications reconciled, continue medications without change      Nicole Magdaleno DO  [facility]    Xenia Osorio is a 72 year old, presenting for the following health issues:  Chronic Disease Management and ER F/U ([facility] 9/1/23)        9/18/2023     1:46 PM   Additional Questions   Roomed by Zoe   Accompanied by spouse         9/18/2023     1:46 PM   Patient Reported Additional Medications   Patient reports taking the following new medications none       History of Present Illness       Reason for visit:  General Health Checkup and followup from recent ER visit    He eats 2-3 servings of fruits and vegetables daily.He consumes 0 sweetened beverage(s) daily.He  exercises with enough effort to increase his heart rate 9 or less minutes per day.  He exercises with enough effort to increase his heart rate 3 or less days per week.   He is taking medications regularly.       Diabetes Follow-up    How often are you checking your blood sugar? One time daily  What time of day are you checking your blood sugars (select all that apply)?  Before meals  Have you had any blood sugars above 200?  No-- yesterday 114  Have you had any blood sugars below 70?  No  What symptoms do you notice when your blood sugar is low?   Slight headache  What concerns do you have today about your diabetes? None   Do you have any of these symptoms? (Select all that apply)  No numbness or tingling in feet.  No redness, sores or blisters on feet.  No complaints of excessive thirst.  No reports of blurry vision.  No significant changes to weight.  Have you had a diabetic eye exam in the last 12 months? Yes- Date of last eye exam: 6/19/23,  Location: [facility]    Lab Results   Component Value Date    A1C 6.8 09/18/2023    A1C 7.3 03/21/2023    A1C 6.9 05/06/2022    A1C 8.0 02/14/2022    A1C 6.8 07/23/2021    A1C 7.2 03/08/2021    A1C 7.2 08/04/2020    A1C 7.0 11/19/2019    A1C 6.2 01/03/2019    A1C 5.9 06/04/2018     Glipizide 5 mg daily and metformin 1000 mg twice daily        Hyperlipidemia Follow-Up    Are you regularly taking any medication or supplement to lower your cholesterol?   Yes- atorvastatin  Are you having muscle aches or other side effects that you think could be caused by your cholesterol lowering medication?  Yes- only takes atorvastatin 2x/ week     LDL Cholesterol Calculated   Date Value Ref Range Status   03/21/2023 88 <=100 mg/dL Final   03/08/2021 82 <100 mg/dL Final     Comment:     Desirable:       <100 mg/dl         Hypertension Follow-up    Do you check your blood pressure regularly outside of the clinic? Yes - sometimes at work  Are you following a low  salt diet? Yes  Are your blood pressures ever more than 140 on the top number (systolic) OR more   than 90 on the bottom number (diastolic), for example 140/90? No 130/70  Lisinopril 20 mg daily     BP Readings from Last 2 Encounters:   09/18/23 128/70   09/01/23 130/67     Hemoglobin A1C (%)   Date Value   09/18/2023 6.8 (H)   03/21/2023 7.3 (H)   03/08/2021 7.2 (H)   08/04/2020 7.2 (H)     LDL Cholesterol Calculated (mg/dL)   Date Value   03/21/2023 88   02/13/2022 67   03/08/2021 82   11/19/2019 80         Chronic Kidney Disease Follow-up    Do you take any over the counter pain medicine?: No  Creatinine   Date Value Ref Range Status   03/21/2023 1.48 (H) 0.67 - 1.17 mg/dL Final   03/08/2021 1.07 0.66 - 1.25 mg/dL Final         Pain History:  When did you first notice your pain? chronic   Have you seen this provider for your pain in the past? Yes   Where in your body do you have pain? shoulders  Are you seeing anyone else for your pain? No        7/23/2021     2:18 PM 3/21/2023     8:19 AM 9/18/2023     1:41 PM   PHQ-9 SCORE   PHQ-9 Total Score MyChart  0 1 (Minimal depression)   PHQ-9 Total Score 0 0 1           7/23/2021     2:18 PM 3/21/2023     8:21 AM 9/18/2023     1:42 PM   DAVY-7 SCORE   Total Score  0 (minimal anxiety) 0 (minimal anxiety)   Total Score 0 0 0         Chronic Pain Follow Up:    Location of pain: both shoulders  Analgesia/pain control:    - Recent changes:  no changes    - Overall control: Comfortably manageable    - Current treatments: Tramadol   Adherence:     - Do you ever take more pain medicine than prescribed? Yes:  sometimes    - When did you take your last dose of pain medicine?  Last night   Adverse effects: No   PDMP Review         Value Time User    State PDMP site checked  Yes 9/18/2023  2:18 PM Nicole Magdaleno, DO          Last CSA Agreement:   CSA -- Patient Level:     [Media Unavailable] Controlled Substance Agreement - Opioid - Scan on 3/21/2023 11:49 AM   [Media Unavailable]  "Controlled Substance Agreement - Opioid - Scan on 7/23/2021  4:51 PM   [Media Unavailable] Controlled Substance Agreement - Opioid - Scan on 1/3/2019  3:13 PM       Last UDS: 3/23/2023        ED/UC Followup:    Facility:  [facility]  Date of visit: 9/1/23  Reason for visit: dizziness  Current Status: doing better- didn't even fill Rx for Meclizine      Review of Systems   Constitutional, HEENT, cardiovascular, pulmonary, gi and gu systems are negative, except as otherwise noted.      Objective    /70 (BP Location: Right arm, Patient Position: Sitting, Cuff Size: Adult Regular)   Pulse 87   Temp 98.8  F (37.1  C) (Oral)   Resp 16   Ht 1.626 m (5' 4\")   Wt 61.5 kg (135 lb 9.6 oz)   SpO2 99%   BMI 23.28 kg/m    Body mass index is 23.28 kg/m .  Physical Exam   GENERAL: healthy, alert and no distress  NECK: no adenopathy, no asymmetry, masses, or scars and thyroid normal to palpation  RESP: lungs clear to auscultation - no rales, rhonchi or wheezes  CV: regular rate and rhythm, normal S1 S2, no S3 or S4, no murmur, click or rub, no peripheral edema and peripheral pulses strong  MS: no gross musculoskeletal defects noted, no edema  PSYCH: mentation appears normal, affect normal/bright    Results for orders placed or performed in visit on 09/18/23 (from the past 24 hour(s))   HEMOGLOBIN A1C   Result Value Ref Range    Hemoglobin A1C 6.8 (H) 0.0 - 5.6 %                     "

## 2023-09-19 ENCOUNTER — TELEPHONE (OUTPATIENT)
Dept: FAMILY MEDICINE | Facility: CLINIC | Age: 73
End: 2023-09-19
Payer: COMMERCIAL

## 2023-09-19 NOTE — TELEPHONE ENCOUNTER
Please call this patient and let him know his kidney function is slightly worse than it was 6 months ago.  He is right on the edge of needing to stop the metformin.  Would like the patient to get this rechecked in 1 month.  If the kidney function is reduced further we will need to come up with different medications for his diabetes.  We talked about this at his office visit    Nicole Magdaleno DO

## 2023-09-19 NOTE — TELEPHONE ENCOUNTER
Called and reviewed message with patient. He will call and schedule his lab only appointment in one month.        Saray Ann RN

## 2023-11-13 DIAGNOSIS — M75.41 IMPINGEMENT SYNDROME OF BOTH SHOULDERS: ICD-10-CM

## 2023-11-13 DIAGNOSIS — M75.42 IMPINGEMENT SYNDROME OF BOTH SHOULDERS: ICD-10-CM

## 2023-11-14 RX ORDER — TRAMADOL HYDROCHLORIDE 50 MG/1
50 TABLET ORAL EVERY 6 HOURS PRN
Qty: 90 TABLET | Refills: 0 | Status: SHIPPED | OUTPATIENT
Start: 2023-11-14 | End: 2024-01-11

## 2023-11-17 NOTE — TELEPHONE ENCOUNTER
Patient is due for a diabetes and hypertension checkup in May.  I have sent in a refill on his lisinopril until then.  At his last office visit he had firm concerning lymph node.  I referred him to see ear nose and throat but I have not seen that he is made that appointment yet.  This was 2 months ago.  Please contact him and remind him he needs to make this appointment with ear nose and throat.    Nicole Magdaleno DO     room air

## 2024-01-10 DIAGNOSIS — M75.41 IMPINGEMENT SYNDROME OF BOTH SHOULDERS: ICD-10-CM

## 2024-01-10 DIAGNOSIS — M75.42 IMPINGEMENT SYNDROME OF BOTH SHOULDERS: ICD-10-CM

## 2024-01-11 RX ORDER — TRAMADOL HYDROCHLORIDE 50 MG/1
50 TABLET ORAL EVERY 6 HOURS PRN
Qty: 90 TABLET | Refills: 0 | Status: SHIPPED | OUTPATIENT
Start: 2024-01-11 | End: 2024-03-13

## 2024-01-11 NOTE — TELEPHONE ENCOUNTER
The patient is due for an appointment in the middle of March.  I have refilled his medication please make sure he makes an appointment for follow-up    Nicole Magdaleno DO

## 2024-01-12 NOTE — TELEPHONE ENCOUNTER
Called patient and left a voicemail message to call the clinic and schedule a Medicare Wellness/ med check appointment.      Payal Monae

## 2024-01-19 ENCOUNTER — TELEPHONE (OUTPATIENT)
Dept: FAMILY MEDICINE | Facility: CLINIC | Age: 74
End: 2024-01-19

## 2024-01-19 ENCOUNTER — OFFICE VISIT (OUTPATIENT)
Dept: URGENT CARE | Facility: URGENT CARE | Age: 74
End: 2024-01-19
Payer: COMMERCIAL

## 2024-01-19 VITALS
OXYGEN SATURATION: 98 % | DIASTOLIC BLOOD PRESSURE: 69 MMHG | BODY MASS INDEX: 23.69 KG/M2 | TEMPERATURE: 98 F | SYSTOLIC BLOOD PRESSURE: 130 MMHG | WEIGHT: 138 LBS | HEART RATE: 91 BPM

## 2024-01-19 DIAGNOSIS — M25.521 RIGHT ELBOW PAIN: Primary | ICD-10-CM

## 2024-01-19 DIAGNOSIS — M70.21 OLECRANON BURSITIS OF RIGHT ELBOW: ICD-10-CM

## 2024-01-19 PROCEDURE — 99213 OFFICE O/P EST LOW 20 MIN: CPT | Performed by: NURSE PRACTITIONER

## 2024-01-19 RX ORDER — NAPROXEN SODIUM 220 MG
220 TABLET ORAL 2 TIMES DAILY WITH MEALS
Qty: 30 TABLET | Refills: 0 | Status: SHIPPED | OUTPATIENT
Start: 2024-01-19 | End: 2024-05-23

## 2024-01-19 NOTE — PATIENT INSTRUCTIONS
Wrap and cushion elbow.    Rest as able  Ice as needed for pain and inflamation.    Aleve twice a day for 1 week.    Follow up with ortho if this persists or worsens.  May need aspiration.

## 2024-01-19 NOTE — PROGRESS NOTES
Assessment & Plan     Right elbow pain  - naproxen sodium (ANAPROX) 220 MG tablet  Dispense: 30 tablet; Refill: 0    Olecranon bursitis of right elbow  - naproxen sodium (ANAPROX) 220 MG tablet  Dispense: 30 tablet; Refill: 0     Patient Instructions     Wrap and cushion elbow.    Rest as able  Ice as needed for pain and inflamation.    Aleve twice a day for 1 week.    Follow up with ortho if this persists or worsens.  May need aspiration.            Return in about 1 week (around 1/26/2024) for with regular provider if symptoms persist.    CASI Garcia CNP  [facility] KAREN Osorio is a 73 year old male who presents to clinic today for the following health issues:  Chief Complaint   Patient presents with    Elbow Pain     Right side, no injury, pain x 3 days      HPI      MS Injury/Pain    Onset of symptoms was 3 day(s) ago.  Location: right elbow  Context:   No known injury or trauma.        Course of symptoms is worsening.    Severity moderate  Current and Associated symptoms: Pain, Swelling, and Redness  Denies  Bruising, Decreased range of motion, and Stiffness  Aggravating Factors: movement and pressure  Therapies to improve symptoms include: ice and ibuprofen  This is the first time this type of problem has occurred for this patient.   Hurts to rest elbow on a table.      Review of Systems  Constitutional, HEENT, cardiovascular, pulmonary, GI, , musculoskeletal, neuro, skin, endocrine and psych systems are negative, except as otherwise noted.      Objective    /69 (BP Location: Left arm, Patient Position: Sitting, Cuff Size: Adult Regular)   Pulse 91   Temp 98  F (36.7  C) (Tympanic)   Wt 62.6 kg (138 lb)   SpO2 98%   BMI 23.69 kg/m    Physical Exam   GENERAL: alert and no distress  RESP: lungs clear to auscultation - no rales, rhonchi or wheezes  CV: regular rate and rhythm, normal S1 S2, no S3 or S4, no murmur, click or rub, no peripheral  edema  MS: 1+ edema to right elbow with erythema, peripheral pulses normal, tenderness to palpation right olecranon

## 2024-01-19 NOTE — TELEPHONE ENCOUNTER
Patient Quality Outreach    Patient is due for the following:   Diabetes -  A1C, BP Check, and Diabetic Follow-Up Visit  Hypertension -  Hypertension follow-up visit  Colon Cancer Screening  Physical Annual Wellness Visit    Next Steps:   Schedule a Annual Wellness Visit    Type of outreach:    Sent letter.      Questions for provider review:    None           Ingrid King CMA  Chart routed to Care Team.

## 2024-01-19 NOTE — LETTER
January 19, 2024      Devon Watts  [address] [address]            Dear Devon Watts,    Your clinic record indicates that you are overdue for an Annual Wellness Exam, Diabetic follow up and Blood Pressure Check. Please call the  at [phone] to schedule an appointment.     If you have questions about this letter please contact your provider.    Sincerely,    Your [facility] Team

## 2024-01-23 NOTE — TELEPHONE ENCOUNTER
Called patient and left a voicemail message to call the clinic and schedule a Wellness exam/ med check appointment.      Payal Monae

## 2024-03-11 DIAGNOSIS — M75.41 IMPINGEMENT SYNDROME OF BOTH SHOULDERS: ICD-10-CM

## 2024-03-11 DIAGNOSIS — M75.42 IMPINGEMENT SYNDROME OF BOTH SHOULDERS: ICD-10-CM

## 2024-03-13 RX ORDER — TRAMADOL HYDROCHLORIDE 50 MG/1
50 TABLET ORAL EVERY 6 HOURS PRN
Qty: 90 TABLET | Refills: 0 | Status: SHIPPED | OUTPATIENT
Start: 2024-03-13 | End: 2024-05-15

## 2024-04-06 DIAGNOSIS — E78.5 HYPERLIPIDEMIA LDL GOAL <70: ICD-10-CM

## 2024-04-08 RX ORDER — ATORVASTATIN CALCIUM 10 MG/1
10 TABLET, FILM COATED ORAL DAILY
Qty: 30 TABLET | Refills: 0 | Status: SHIPPED | OUTPATIENT
Start: 2024-04-08 | End: 2024-05-23

## 2024-04-30 ENCOUNTER — TELEPHONE (OUTPATIENT)
Dept: FAMILY MEDICINE | Facility: CLINIC | Age: 74
End: 2024-04-30
Payer: COMMERCIAL

## 2024-04-30 NOTE — TELEPHONE ENCOUNTER
Patient Quality Outreach    Patient is due for the following:   Diabetes -  Diabetic Follow-Up Visit  Physical Annual Wellness Visit    Next Steps:   Schedule a Annual Wellness Visit    Type of outreach:    Phone, left message for patient/parent to call back.        Patient needs to schedule Annual Wellness/ diabetes check     Questions for provider review:    None           Zoe De La O [facility]

## 2024-05-10 ENCOUNTER — TELEPHONE (OUTPATIENT)
Dept: FAMILY MEDICINE | Facility: CLINIC | Age: 74
End: 2024-05-10
Payer: COMMERCIAL

## 2024-05-10 DIAGNOSIS — M75.41 IMPINGEMENT SYNDROME OF BOTH SHOULDERS: ICD-10-CM

## 2024-05-10 DIAGNOSIS — M75.42 IMPINGEMENT SYNDROME OF BOTH SHOULDERS: ICD-10-CM

## 2024-05-14 NOTE — TELEPHONE ENCOUNTER
Pt called back wanting an explanation as to why he was told that the provider would fill his medication today before 5pm and his medication still isnt ready .      TC advised that this message request was just sent this morning to the provider and that we need to allow the 24-72hrs to process the refill.    Pt hung up         Oma Friend    [facility]

## 2024-05-14 NOTE — TELEPHONE ENCOUNTER
Reason for Call:  Other prescription    Detailed comments: REFILL REQUEST PATIENT IS OUT    traMADol (ULTRAM) 50 MG tablet [04310]     Phone Number Patient can be reached at: Cell number on file:    Telephone Information:   Mobile [phone]       Best Time: ASAP    Can we leave a detailed message on this number? YES    Call taken on 5/14/2024 at 8:29 AM by Veronika Segovia

## 2024-05-15 RX ORDER — TRAMADOL HYDROCHLORIDE 50 MG/1
50 TABLET ORAL EVERY 6 HOURS PRN
Qty: 20 TABLET | Refills: 0 | Status: SHIPPED | OUTPATIENT
Start: 2024-05-15 | End: 2024-05-23

## 2024-05-15 NOTE — TELEPHONE ENCOUNTER
Patient calling again to check on the status of his refill request. Can we have a covering provider look at this refill request?  Please call patient back today with a status.    WENDY Levine  [facility]

## 2024-05-23 ENCOUNTER — VIRTUAL VISIT (OUTPATIENT)
Dept: FAMILY MEDICINE | Facility: CLINIC | Age: 74
End: 2024-05-23
Payer: COMMERCIAL

## 2024-05-23 ENCOUNTER — TELEPHONE (OUTPATIENT)
Dept: FAMILY MEDICINE | Facility: CLINIC | Age: 74
End: 2024-05-23

## 2024-05-23 DIAGNOSIS — M75.42 IMPINGEMENT SYNDROME OF BOTH SHOULDERS: ICD-10-CM

## 2024-05-23 DIAGNOSIS — M75.41 IMPINGEMENT SYNDROME OF BOTH SHOULDERS: ICD-10-CM

## 2024-05-23 DIAGNOSIS — N18.30 TYPE 2 DIABETES MELLITUS WITH STAGE 3 CHRONIC KIDNEY DISEASE, WITHOUT LONG-TERM CURRENT USE OF INSULIN, UNSPECIFIED WHETHER STAGE 3A OR 3B CKD (H): ICD-10-CM

## 2024-05-23 DIAGNOSIS — E11.22 TYPE 2 DIABETES MELLITUS WITH STAGE 3 CHRONIC KIDNEY DISEASE, WITHOUT LONG-TERM CURRENT USE OF INSULIN, UNSPECIFIED WHETHER STAGE 3A OR 3B CKD (H): ICD-10-CM

## 2024-05-23 DIAGNOSIS — I10 BENIGN ESSENTIAL HYPERTENSION: ICD-10-CM

## 2024-05-23 DIAGNOSIS — E78.5 HYPERLIPIDEMIA LDL GOAL <70: ICD-10-CM

## 2024-05-23 DIAGNOSIS — Z12.11 SCREEN FOR COLON CANCER: Primary | ICD-10-CM

## 2024-05-23 PROCEDURE — 99442 PR PHYSICIAN TELEPHONE EVALUATION 11-20 MIN: CPT | Mod: 93 | Performed by: FAMILY MEDICINE

## 2024-05-23 RX ORDER — LISINOPRIL 20 MG/1
20 TABLET ORAL DAILY
Qty: 60 TABLET | Refills: 0 | Status: SHIPPED | OUTPATIENT
Start: 2024-05-23 | End: 2024-06-14

## 2024-05-23 RX ORDER — GLIPIZIDE 5 MG/1
5 TABLET, FILM COATED, EXTENDED RELEASE ORAL DAILY
Qty: 60 TABLET | Refills: 0 | Status: SHIPPED | OUTPATIENT
Start: 2024-05-23 | End: 2024-05-24

## 2024-05-23 RX ORDER — TRAMADOL HYDROCHLORIDE 50 MG/1
50 TABLET ORAL EVERY 6 HOURS PRN
Qty: 30 TABLET | Refills: 0 | Status: SHIPPED | OUTPATIENT
Start: 2024-05-23 | End: 2024-06-14

## 2024-05-23 RX ORDER — ATORVASTATIN CALCIUM 10 MG/1
10 TABLET, FILM COATED ORAL DAILY
Qty: 60 TABLET | Refills: 0 | Status: SHIPPED | OUTPATIENT
Start: 2024-05-23 | End: 2024-05-24

## 2024-05-23 ASSESSMENT — ANXIETY QUESTIONNAIRES
7. FEELING AFRAID AS IF SOMETHING AWFUL MIGHT HAPPEN: NOT AT ALL
3. WORRYING TOO MUCH ABOUT DIFFERENT THINGS: NOT AT ALL
GAD7 TOTAL SCORE: 0
1. FEELING NERVOUS, ANXIOUS, OR ON EDGE: NOT AT ALL
GAD7 TOTAL SCORE: 0
IF YOU CHECKED OFF ANY PROBLEMS ON THIS QUESTIONNAIRE, HOW DIFFICULT HAVE THESE PROBLEMS MADE IT FOR YOU TO DO YOUR WORK, TAKE CARE OF THINGS AT HOME, OR GET ALONG WITH OTHER PEOPLE: NOT DIFFICULT AT ALL
6. BECOMING EASILY ANNOYED OR IRRITABLE: NOT AT ALL
5. BEING SO RESTLESS THAT IT IS HARD TO SIT STILL: NOT AT ALL
2. NOT BEING ABLE TO STOP OR CONTROL WORRYING: NOT AT ALL

## 2024-05-23 ASSESSMENT — PATIENT HEALTH QUESTIONNAIRE - PHQ9
SUM OF ALL RESPONSES TO PHQ QUESTIONS 1-9: 0
5. POOR APPETITE OR OVEREATING: NOT AT ALL

## 2024-05-23 NOTE — TELEPHONE ENCOUNTER
Reason for Call:  Appointment Request    Patient requesting this type of appt: Chronic Diease Management/Medication/Follow-Up    Requested provider: Nicole Magdaleno    Reason patient unable to be scheduled: Not within requested timeframe    When does patient want to be seen/preferred time: 3-7 days    Comments: Pt needs a diabetic check and refills. He would like to get in as soon as possible for only his PCP    Okay to leave a detailed message?: No at Cell number on file:    Telephone Information:   Mobile [phone]       Call taken on 5/23/2024 at 8:51 AM by Lacy Ross

## 2024-05-23 NOTE — PROGRESS NOTES
Devon is a 73 year old who is being evaluated via a billable telephone visit.    What phone number would you like to be contacted at? [phone]  How would you like to obtain your AVS? Mail a copy  Originating Location (pt. Location): Home    Distant Location (provider location):  On-site    Assessment & Plan         Type 2 diabetes mellitus with stage 3 chronic kidney disease, without long-term current use of insulin, unspecified whether stage 3a or 3b CKD (H)  The current medical regimen is effective;  continue present plan and medications.    - Adult Eye  Referral; Future  - metFORMIN (GLUCOPHAGE) 1000 MG tablet; Take 1 tablet (1,000 mg) by mouth 2 times daily (with meals) Do not refill until contacted by the patient    Benign essential hypertension  The current medical regimen is effective;  continue present plan and medications.    - lisinopril (ZESTRIL) 20 MG tablet; Take 1 tablet (20 mg) by mouth daily    Hyperlipidemia LDL goal <70  The current medical regimen is effective;  continue present plan and medications.      Impingement syndrome of both shoulders  The current medical regimen is effective;  continue present plan and medications.    - traMADol (ULTRAM) 50 MG tablet; Take 1 tablet (50 mg) by mouth every 6 hours as needed for severe pain    Needs in person appointment      Follow up soon in person     Subjective   Devon is a 73 year old, presenting for the following health issues:  Chronic Disease Management        5/23/2024     2:30 PM   Additional Questions   Roomed by Zoe MILLER     History of Present Illness       Diabetes:   He presents for follow up of diabetes.  He is checking home blood glucose a few times a week.   He checks blood glucose before meals.  Blood glucose is never over 200 and never under 70.  When his blood glucose is low, the patient is asymptomatic for confusion, blurred vision, lethargy and reports not feeling dizzy, shaky, or weak.   He has no concerns regarding his  diabetes at this time.  He is having numbness in feet and burning in feet.  The patient has not had a diabetic eye exam in the last 12 months.          Hyperlipidemia:  He presents for follow up of hyperlipidemia.   He is taking medication to lower cholesterol. He is not having myalgia or other side effects to statin medications.    Hypertension: He presents for follow up of hypertension.  He does check blood pressure  regularly outside of the clinic. Outpatient blood pressures have not been over 140/90. He follows a low salt diet.     He eats 2-3 servings of fruits and vegetables daily.He consumes 0 sweetened beverage(s) daily.He exercises with enough effort to increase his heart rate 20 to 29 minutes per day.  He exercises with enough effort to increase his heart rate 3 or less days per week.   He is taking medications regularly.       Pain History:  When did you first notice your pain? chronic   Have you seen this provider for your pain in the past? Yes   Where in your body do you have pain? Shoulders and back pain  Are you seeing anyone else for your pain? No        3/21/2023     8:19 AM 9/18/2023     1:41 PM 5/23/2024     2:29 PM   PHQ-9 SCORE   PHQ-9 Total Score MyChart 0 1 (Minimal depression)    PHQ-9 Total Score 0 1 0           3/21/2023     8:21 AM 9/18/2023     1:42 PM 5/23/2024     2:29 PM   DAVY-7 SCORE   Total Score 0 (minimal anxiety) 0 (minimal anxiety)    Total Score 0 0 0           3/21/2023     8:38 AM 9/18/2023     2:10 PM 5/23/2024     2:28 PM   PEG Score   PEG Total Score 4 1.67 2.67     Chronic Pain Follow Up:    Location of pain: shoulders and back   Analgesia/pain control:    - Recent changes:  no changes    - Overall control: Tolerable with discomfort    - Current treatments: Tramadol-- left over Lidocaine patches at times   Adherence:     - Do you ever take more pain medicine than prescribed? No    - When did you take your last dose of pain medicine?     Adverse effects: No   PDMP Review          Value Time User    State PDMP site checked  Yes 1/19/2024  4:02 PM Maricruz Davis, CASI CNP          Last CSA Agreement:   CSA -- Patient Level:     [Media Unavailable] Controlled Substance Agreement - Opioid - Scan on 3/21/2023 11:49 AM   [Media Unavailable] Controlled Substance Agreement - Opioid - Scan on 7/23/2021  4:51 PM   [Media Unavailable] Controlled Substance Agreement - Opioid - Scan on 1/3/2019  3:13 PM       Last UDS: 3/23/2023          Review of Systems  Constitutional, HEENT, cardiovascular, pulmonary, gi and gu systems are negative, except as otherwise noted.      Objective           Vitals:  No vitals were obtained today due to virtual visit.    Physical Exam   General: Alert and no distress //Respiratory: No audible wheeze, cough, or shortness of breath // Psychiatric:  Appropriate affect, tone, and pace of words            Phone call duration: 20 minutes  Signed Electronically by: Nicole Magdaleno DO

## 2024-05-24 RX ORDER — ATORVASTATIN CALCIUM 10 MG/1
10 TABLET, FILM COATED ORAL DAILY
Qty: 90 TABLET | Refills: 3 | Status: SHIPPED | OUTPATIENT
Start: 2024-05-24

## 2024-05-24 RX ORDER — GLIPIZIDE 5 MG/1
5 TABLET, FILM COATED, EXTENDED RELEASE ORAL DAILY
Qty: 90 TABLET | Refills: 1 | Status: SHIPPED | OUTPATIENT
Start: 2024-05-24

## 2024-06-14 ENCOUNTER — OFFICE VISIT (OUTPATIENT)
Dept: FAMILY MEDICINE | Facility: CLINIC | Age: 74
End: 2024-06-14
Payer: COMMERCIAL

## 2024-06-14 VITALS
HEIGHT: 64 IN | TEMPERATURE: 97.8 F | WEIGHT: 139 LBS | SYSTOLIC BLOOD PRESSURE: 120 MMHG | RESPIRATION RATE: 23 BRPM | OXYGEN SATURATION: 100 % | BODY MASS INDEX: 23.73 KG/M2 | HEART RATE: 92 BPM | DIASTOLIC BLOOD PRESSURE: 74 MMHG

## 2024-06-14 DIAGNOSIS — E11.22 TYPE 2 DIABETES MELLITUS WITH STAGE 3 CHRONIC KIDNEY DISEASE, WITHOUT LONG-TERM CURRENT USE OF INSULIN, UNSPECIFIED WHETHER STAGE 3A OR 3B CKD (H): Primary | ICD-10-CM

## 2024-06-14 DIAGNOSIS — I10 BENIGN ESSENTIAL HYPERTENSION: ICD-10-CM

## 2024-06-14 DIAGNOSIS — N18.30 TYPE 2 DIABETES MELLITUS WITH STAGE 3 CHRONIC KIDNEY DISEASE, WITHOUT LONG-TERM CURRENT USE OF INSULIN, UNSPECIFIED WHETHER STAGE 3A OR 3B CKD (H): Primary | ICD-10-CM

## 2024-06-14 DIAGNOSIS — M75.42 IMPINGEMENT SYNDROME OF BOTH SHOULDERS: ICD-10-CM

## 2024-06-14 DIAGNOSIS — M75.41 IMPINGEMENT SYNDROME OF BOTH SHOULDERS: ICD-10-CM

## 2024-06-14 DIAGNOSIS — Z12.11 SCREEN FOR COLON CANCER: ICD-10-CM

## 2024-06-14 LAB
HBA1C MFR BLD: 7.3 % (ref 0–5.6)
HOLD SPECIMEN: NORMAL

## 2024-06-14 PROCEDURE — 83036 HEMOGLOBIN GLYCOSYLATED A1C: CPT | Performed by: FAMILY MEDICINE

## 2024-06-14 PROCEDURE — 82043 UR ALBUMIN QUANTITATIVE: CPT | Performed by: FAMILY MEDICINE

## 2024-06-14 PROCEDURE — G2211 COMPLEX E/M VISIT ADD ON: HCPCS | Performed by: FAMILY MEDICINE

## 2024-06-14 PROCEDURE — 36415 COLL VENOUS BLD VENIPUNCTURE: CPT | Performed by: FAMILY MEDICINE

## 2024-06-14 PROCEDURE — 80061 LIPID PANEL: CPT | Performed by: FAMILY MEDICINE

## 2024-06-14 PROCEDURE — 82570 ASSAY OF URINE CREATININE: CPT | Performed by: FAMILY MEDICINE

## 2024-06-14 PROCEDURE — 80048 BASIC METABOLIC PNL TOTAL CA: CPT | Performed by: FAMILY MEDICINE

## 2024-06-14 PROCEDURE — 99214 OFFICE O/P EST MOD 30 MIN: CPT | Performed by: FAMILY MEDICINE

## 2024-06-14 RX ORDER — LISINOPRIL 20 MG/1
20 TABLET ORAL DAILY
Qty: 90 TABLET | Refills: 3 | Status: SHIPPED | OUTPATIENT
Start: 2024-06-14

## 2024-06-14 RX ORDER — TRAMADOL HYDROCHLORIDE 50 MG/1
TABLET ORAL
Qty: 30 TABLET | Refills: 2 | Status: SHIPPED | OUTPATIENT
Start: 2024-06-14 | End: 2024-08-14

## 2024-06-14 NOTE — PROGRESS NOTES
{PROVIDER CHARTING PREFERENCE:543800}    Xenia Osorio is a 73 year old, presenting for the following health issues:  Diabetes  {(!) Visit Details have not yet been documented.  Please enter Visit Details and then use this list to pull in documentation. (Optional):099512}  HPI       {SUPERLIST (Optional):357937}  {additonal problems for provider to add (Optional):991465}    {ROS Picklists (Optional):487488}      Objective    There were no vitals taken for this visit.  There is no height or weight on file to calculate BMI.  Physical Exam   {Exam List (Optional):404750}    {Diagnostic Test Results (Optional):387159}        Signed Electronically by: Wang Elizondo MD  {Email feedback regarding this note to [email or web address]   :524590}

## 2024-06-14 NOTE — PROGRESS NOTES
Assessment & Plan     Type 2 diabetes mellitus with stage 3 chronic kidney disease, without long-term current use of insulin, unspecified whether stage 3a or 3b CKD (H)  A1c at 7.3  Continue with current medication, advised with diet, lichenification, increase physical activity  - HEMOGLOBIN A1C  - Lipid panel reflex to direct LDL Non-fasting; Future  - Albumin Random Urine Quantitative with Creat Ratio  - metFORMIN (GLUCOPHAGE) 1000 MG tablet; Take 1 tablet (1,000 mg) by mouth 2 times daily (with meals) Do not refill until contacted by the patient  - Lipid panel reflex to direct LDL Non-fasting    Screen for colon cancer  Screening.  - Colonoscopy Screening  Referral; Future    Benign essential hypertension    - Basic metabolic panel  (Ca, Cl, CO2, Creat, Gluc, K, Na, BUN); Future  - lisinopril (ZESTRIL) 20 MG tablet; Take 1 tablet (20 mg) by mouth daily  - Basic metabolic panel  (Ca, Cl, CO2, Creat, Gluc, K, Na, BUN)    Impingement syndrome of both shoulders  Continue to use Tramadol, one tab a day as needed.  - traMADol (ULTRAM) 50 MG tablet; One tab at bedtime as needed        [place] Prescription Monitoring Program, ( ) referenced. No indication of misuse, or abuse.      Xenia Osorio is a 73 year old, presenting for the following health issues:  Diabetes    History of diabetes, hypertension.  Blood sugars been well-controlled.  Patient does take Ultram for chronic shoulder pain and low back pain.  Denies alcohol use, no drugs use.  Denies depression, suicidal thoughts or ideation.        6/14/2024    10:01 AM   Additional Questions   Roomed by guille bray ma   Accompanied by self         6/14/2024    10:01 AM   Patient Reported Additional Medications   Patient reports taking the following new medications tylenol prn, vitamin b complex sometimes     History of Present Illness       Diabetes:   He presents for follow up of diabetes.  He is checking home blood glucose one time daily.   He  "checks blood glucose before meals.  Blood glucose is never over 200 and never under 70.  When his blood glucose is low, the patient is asymptomatic for confusion, blurred vision, lethargy and reports not feeling dizzy, shaky, or weak.   He has no concerns regarding his diabetes at this time.  He is having numbness in feet and burning in feet.  The patient has not had a diabetic eye exam in the last 12 months.          Hyperlipidemia:  He presents for follow up of hyperlipidemia.   He is taking medication to lower cholesterol. He is not having myalgia or other side effects to statin medications.    Hypertension: He presents for follow up of hypertension.  He does check blood pressure  regularly outside of the clinic. Outpatient blood pressures have not been over 140/90. He follows a low salt diet.     He eats 2-3 servings of fruits and vegetables daily.He consumes 0 sweetened beverage(s) daily.He exercises with enough effort to increase his heart rate 60 or more minutes per day.  He exercises with enough effort to increase his heart rate 7 days per week.   He is taking medications regularly.         Review of Systems  Constitutional, HEENT, cardiovascular, pulmonary, GI, , musculoskeletal, neuro, skin, endocrine and psych systems are negative, except as otherwise noted.      Objective    BP (!) 147/72 (BP Location: Right arm, Patient Position: Chair, Cuff Size: Adult Regular)   Pulse 92   Temp 97.8  F (36.6  C) (Oral)   Resp 23   Ht 1.632 m (5' 4.27\")   Wt 63 kg (139 lb)   SpO2 100%   BMI 23.66 kg/m    Body mass index is 23.66 kg/m .  Physical Exam   GENERAL: alert and no distress  NECK: no adenopathy, no asymmetry, masses, or scars  RESP: lungs clear to auscultation - no rales, rhonchi or wheezes  CV: regular rate and rhythm, normal S1 S2, no S3 or S4, no murmur, click or rub, no peripheral edema  ABDOMEN: soft, nontender, no hepatosplenomegaly, no masses and bowel sounds normal  MS: no gross " musculoskeletal defects noted, no edema  PSYCH: mentation appears normal, affect normal/bright    Lab Results   Component Value Date    A1C 7.3 06/14/2024    A1C 6.8 09/18/2023    A1C 7.3 03/21/2023    A1C 6.9 05/06/2022    A1C 8.0 02/14/2022    A1C 7.2 03/08/2021    A1C 7.2 08/04/2020    A1C 7.0 11/19/2019    A1C 6.2 01/03/2019    A1C 5.9 06/04/2018     Orders Placed This Encounter   Procedures    HEMOGLOBIN A1C    Lipid panel reflex to direct LDL Non-fasting    Albumin Random Urine Quantitative with Creat Ratio    Extra Tube    Extra Red Top Tube    Extra Green Top (Lithium Heparin) Tube    Extra Purple Top Tube    Basic metabolic panel  (Ca, Cl, CO2, Creat, Gluc, K, Na, BUN)    Colonoscopy Screening  Referral             Signed Electronically by: Wang Elizondo MD

## 2024-06-14 NOTE — LETTER
"June 17, 2024      Devon Watts  [address] [address] [address]        Dear ,    We are writing to inform you of your test results.    Normal for liver functions.  Normal for and electrolyte.     Kidney functions stable,     Total Cholesterol normal, and LDL normal     Continue with current medication.        Have a good day.     Ways to improve your cholesterol...     1- Eats less saturated fats (including avoiding \"trans\" fats), fatty foods.  Eat more baked food, than fried food.     2 - Eat more unsaturated fats  - found in vegetables, grains, and tree nuts.  Also by replacing butter with canola oil or olive oil.     3 - Eat more nuts.  1-2 ounces (a small handful) of almonds, walnuts, hazelnuts or pecans once a day in place of other less healthy snacks.     4 - Eat more high fiber foods - vegetables and whole grains including oat bran, oats, beans, peas, and flax seed.     5 - Eat more fish - such as salmon, tuna, mackerel, and sardines.  1 or 2 six ounce servings per week is a healthy replacement for other proteins.     6 - Exercise for at least 120 minutes per week - which is equal to 30 minutes 4 days per week.     7- avoid late meals, at least  a few hours before bedtime.    Resulted Orders   HEMOGLOBIN A1C   Result Value Ref Range    Hemoglobin A1C 7.3 (H) 0.0 - 5.6 %      Comment:      Normal <5.7%   Prediabetes 5.7-6.4%    Diabetes 6.5% or higher     Note: Adopted from ADA consensus guidelines.   Albumin Random Urine Quantitative with Creat Ratio   Result Value Ref Range    Creatinine Urine mg/dL 152.0 mg/dL      Comment:      The reference ranges have not been established in urine creatinine. The results should be integrated into the clinical context for interpretation.    Albumin Urine mg/L <12.0 mg/L      Comment:      The reference ranges have not been established in urine albumin. The results should be integrated into the clinical context for interpretation.    Albumin Urine " mg/g Cr        Comment:      Unable to calculate, urine albumin and/or urine creatinine is outside detectable limits.  Microalbuminuria is defined as an albumin:creatinine ratio of 17 to 299 for males and 25 to 299 for females. A ratio of albumin:creatinine of 300 or higher is indicative of overt proteinuria.  Due to biologic variability, positive results should be confirmed by a second, first-morning random or 24-hour timed urine specimen. If there is discrepancy, a third specimen is recommended. When 2 out of 3 results are in the microalbuminuria range, this is evidence for incipient nephropathy and warrants increased efforts at glucose control, blood pressure control, and institution of therapy with an angiotensin-converting-enzyme (ACE) inhibitor (if the patient can tolerate it).     Lipid panel reflex to direct LDL Non-fasting   Result Value Ref Range    Cholesterol 95 <200 mg/dL    Triglycerides 41 <150 mg/dL    Direct Measure HDL 43 >=40 mg/dL    LDL Cholesterol Calculated 44 <=100 mg/dL    Non HDL Cholesterol 52 <130 mg/dL    Narrative    Cholesterol  Desirable:  <200 mg/dL    Triglycerides  Normal:  Less than 150 mg/dL  Borderline High:  150-199 mg/dL  High:  200-499 mg/dL  Very High:  Greater than or equal to 500 mg/dL    Direct Measure HDL  Female:  Greater than or equal to 50 mg/dL   Male:  Greater than or equal to 40 mg/dL    LDL Cholesterol  Desirable:  <100mg/dL  Above Desirable:  100-129 mg/dL   Borderline High:  130-159 mg/dL   High:  160-189 mg/dL   Very High:  >= 190 mg/dL    Non HDL Cholesterol  Desirable:  130 mg/dL  Above Desirable:  130-159 mg/dL  Borderline High:  160-189 mg/dL  High:  190-219 mg/dL  Very High:  Greater than or equal to 220 mg/dL   Basic metabolic panel  (Ca, Cl, CO2, Creat, Gluc, K, Na, BUN)   Result Value Ref Range    Sodium 143 135 - 145 mmol/L      Comment:      Reference intervals for this test were updated on 09/26/2023 to more accurately reflect our healthy population.  There may be differences in the flagging of prior results with similar values performed with this method. Interpretation of those prior results can be made in the context of the updated reference intervals.     Potassium 5.2 3.4 - 5.3 mmol/L    Chloride 109 (H) 98 - 107 mmol/L    Carbon Dioxide (CO2) 25 22 - 29 mmol/L    Anion Gap 9 7 - 15 mmol/L    Urea Nitrogen 14.4 8.0 - 23.0 mg/dL    Creatinine 1.54 (H) 0.67 - 1.17 mg/dL    GFR Estimate 47 (L) >60 mL/min/1.73m2      Comment:      eGFR calculated using 2021 CKD-EPI equation.    Calcium 9.3 8.8 - 10.2 mg/dL    Glucose 128 (H) 70 - 99 mg/dL       If you have any questions or concerns, please call the clinic at the number listed above.       Sincerely,      Wang Elizondo MD

## 2024-06-15 LAB
ANION GAP SERPL CALCULATED.3IONS-SCNC: 9 MMOL/L (ref 7–15)
BUN SERPL-MCNC: 14.4 MG/DL (ref 8–23)
CALCIUM SERPL-MCNC: 9.3 MG/DL (ref 8.8–10.2)
CHLORIDE SERPL-SCNC: 109 MMOL/L (ref 98–107)
CHOLEST SERPL-MCNC: 95 MG/DL
CREAT SERPL-MCNC: 1.54 MG/DL (ref 0.67–1.17)
CREAT UR-MCNC: 152 MG/DL
DEPRECATED HCO3 PLAS-SCNC: 25 MMOL/L (ref 22–29)
EGFRCR SERPLBLD CKD-EPI 2021: 47 ML/MIN/1.73M2
GLUCOSE SERPL-MCNC: 128 MG/DL (ref 70–99)
HDLC SERPL-MCNC: 43 MG/DL
LDLC SERPL CALC-MCNC: 44 MG/DL
MICROALBUMIN UR-MCNC: <12 MG/L
MICROALBUMIN/CREAT UR: NORMAL MG/G{CREAT}
NONHDLC SERPL-MCNC: 52 MG/DL
POTASSIUM SERPL-SCNC: 5.2 MMOL/L (ref 3.4–5.3)
SODIUM SERPL-SCNC: 143 MMOL/L (ref 135–145)
TRIGL SERPL-MCNC: 41 MG/DL

## 2024-07-05 ENCOUNTER — HOSPITAL ENCOUNTER (OUTPATIENT)
Facility: CLINIC | Age: 74
End: 2024-07-05
Attending: COLON & RECTAL SURGERY | Admitting: COLON & RECTAL SURGERY
Payer: COMMERCIAL

## 2024-07-05 ENCOUNTER — TELEPHONE (OUTPATIENT)
Dept: GASTROENTEROLOGY | Facility: CLINIC | Age: 74
End: 2024-07-05
Payer: COMMERCIAL

## 2024-07-05 NOTE — LETTER
2024      Devon Watts  [address] [facility] [address] [address]              Golytely Extended Bowel Prep   Prep instructions for your colonoscopy   For prep questions, please call: [facility] - [address] [place] [address] - [phone] option 2  Bowel prep was sent 10/8/2024 to [id number] - [facility] [address] AT [place] [address] & [facility].      Please read these instructions carefully at least 7 days prior to your colonoscopy procedure. Be sure to follow all directions completely. The inside of your colon must be clean to allow for a complete examination for the presence of any growths, polyps, and/or abnormalities, as well as their biopsy or removal. A number of tips are included in order to make this part of the procedure as comfortable as possible.    Getting ready      prescription at the pharmacy. Endoscopy team will order this about 2 weeks before to your scheduled procedure. Included in the kit will be the followin  Dulcolax (Bisacodyl) 5mg tablets  Two containers of Polyethylene glycol (Golytely, Colyte, Nulytely, Gavilyte-G)    A nurse will call you to go over your appointment details and prep instructions. Not completing the nurse call could result with your appointment being cancelled.    You must arrange for an adult to drive you home after your exam. Your colonoscopy cannot be done unless you have a ride. If you need to use public transportation, someone must ride with you and stay with you for up to 24 hours.       7 days before procedure     Consult with your prescribing provider about stopping any:     Diabetic/Weight Loss Injectable Medication GLP-1 agonist (such as Exenatide (Byetta, Bydureon),  Mounjaro (Tirzepatide).  Ozempic (Semaglutide). Semaglutide. Symlin (Pamlintide), Tanzeum (Albiglutide). Tirzepatide-Weight Management (Zepbound), Trulicity (Dulaglutide), Victoza (Saxenda, Liraglutide), Wegovy (Semaglutide)  please follow below guidelines for holding:    For weekly injection HOLD 7 days before procedure.  For once or twice a day injection HOLD the day before procedure and day of procedure.  For oral, daily dosing (Rybelsus) HOLD 7 days before procedure.    Blood thinning and/or anti-platelet medications: such as Coumadin, Plavix, Xarelto, Eliquis, Lovenox or others, these medications may need to be stopped temporarily before your procedure.     If you take insulin for diabetes, ask your prescribing provider for instructions on how to manage this medication while preparing for a colonoscopy.      Stop taking iron (ferrous sulfate), multivitamins that contain iron, and/or fiber supplements (Metamucil, Benefiber, Psyllium husk powder, Fibercon, Bran, etc.).      Stop eating whole kernel corn, popcorn, nuts, and foods that contain seeds. These can stay in the colon for many days, and they can clog up the colonoscope.    Begin a low-fiber diet. (See examples below). No Olestra (a fat substitute).   Consume no more than 10-15 grams of fiber each day.       LOW FIBER DIET   You may have: Do not have:    Starches: White bread, rolls, biscuits, croissants, Model toast, white flour tortillas, waffles, pancakes, Sami toast; white rice, noodles, pasta, macaroni; cooked and peeled potatoes; plain crackers, saltines; cooked farina or cream of rice; puffed rice, corn flakes, Rice Krispies, Special K      Vegetables: tender cooked and canned, vegetable broths     Fruits and fruit juices: Strained fruit juice, canned fruit without seeds or skin (not pineapple), applesauce, pear sauce, ripe bananas, melons (not watermelon)     Milk products: Milk (plain or flavored), cheese, cottage cheese, yogurt (no berries), custard, ice cream       Proteins: Tender, well-cooked ground beef, lamb, veal, ham, pork, chicken, turkey, fish or organ meat, Tofu, eggs, creamy peanut butter      Fats and condiments: Margarine, butter, oils, mayonnaise, sour  cream, salad dressing, plain gravy; spices, cooked herbs; sugar, clear jelly, honey, syrup      Snacks, sweets and drinks: Pretzels, hard candy; plain cakes and cookies (no nuts or seeds); gelatin, plain pudding, sherbet, Popsicles; coffee, tea, carbonated ( fizzy ) drinks  Starches: Breads or rolls that contain nuts, seeds or fruit; whole wheat or whole grain breads that contain more than 2 grams of fiber per serving; cornbread; corn or whole wheat tortillas; potatoes with skin; brown rice, wild rice, quinoa, kasha (buckwheat), and oatmeal      Vegetables: Any raw or steamed vegetables; vegetables with seeds; corn in any form      Fruits and fruit juices: Prunes, prune juice, raisins and other dried fruits, berries and other fruits with seeds, canned pineapple juices with pulp such as orange, grapefruit, pineapple or tomato juice     Milk products: Any yogurt with nuts, seeds or berries      Proteins: Tough, fibrous meats with gristle; cooked dried beans, peas or lentils; crunchy peanut butter     Fats and condiments: Pickles, olives, relish, horseradish; jam, marmalade, preserves      Snacks, sweets and drinks: Popcorn, nuts, seeds, granola, coconut, candies made with nuts or seeds; all desserts that contain nuts, seeds, raisins and other dried fruits, coconut, whole grains or bran.       2 days before procedure       You may have a light, low fiber breakfast and lunch. Begin a clear liquid diet AFTER 1 PM. Do not eat solid foods. (See examples below).    Drink at least eight to ten 8-ounce glasses of water throughout the day  ? ? ? ? ? ? ? ?    Fill one container of Golytely with a full gallon of warm water. Cover and shake until well mixed. Chill in refrigerator until it is time to drink solution.     CLEAR LIQUID DIET:  You can have: Do not have:    Water, tea, coffee (no milk or cream)   Soda pop, Gatorade (not red or purple)   Coconut water   Jell-O, Popsicles (no milk or fruit pieces - not red or purple)    Fat-free soup broth or bouillon   Plain hard candy, such as clear life savers (not red or purple)   Clear juices and fruit-flavored drinks, such as apple juice, white grape juice, Hi-C, and Lito-Aid (not red or purple)    Milk or milk products such as ice cream, malts or shakes, or coffee creamer   Red or purple drinks of any kind such as cranberry juice, grape juice, or Lito-Aid. Avoid red or purple Jell-O, Popsicles, sorbet, sherbet and candy.   Juices with pulp such as orange, grapefruit, pineapple, or tomato juice   Cream soups of any kind   Alcohol and beer   Protein drinks or protein powder     Step 1     At 4 PM, take 2 Dulcolax (Bisacodyl) tablets.  At 5 PM, start drinking one 8-ounce glass of Golytely mixture every 15 minutes until the container is HALF empty. Drink each glass quickly. Store the rest in the refrigerator.   Continue to drink clear liquids.      Reminders While Drinking Laxatives:     After you start drinking the solution, stay near a toilet. You may have watery stools (diarrhea), mild cramping, bloating, and nausea. You may want to use Vaseline on the skin around your anus after each bowel movement or use wet wipes to prevent irritation. Bowel movements will be liquid and dark in color at first and then should turn clear yellow in color. Drinking the prepared solution may make you cold, wearing warm clothing can be helpful.    Some find it helpful to:  For added flavor, include a crystal light lemonade packet in each glass of Golytely, rather than mixing it into the entire prepared mixture.  In between each glass, try sucking on a lemon or a piece of hard candy to help diminish the flavor of the preparation.  Drinking from a straw can help minimize the taste of the prepared mixture.    If you have nausea or vomiting during drinking the solution, rinse your mouth with water and take a 15-30 minute break and then continue drinking solution.       1 day before procedure       Continue on a  clear liquid diet. Do not eat solid foods.    Drink at least eight to ten 8-ounce glasses of water throughout the day  ? ? ? ? ? ? ? ?    Step 2     At 4 PM, take 2 Dulcolax (Bisacodyl) tablets.  At 5 PM, start drinking the 2nd half of Golytely mixture. Drink one 8-ounce glass of Golytely mixture every 15 minutes until the container is empty.  Drink each glass quickly.   Continue to drink clear liquids.    Before you go to bed mix the second container of Golytely and place in the refrigerator for the morning.     Day of procedure     Step 3     6 hours before your arrival time, start drinking one 8-ounce glass of Golytely mixture every 15 minutes until the container is HALF empty. You will not drink the entire container. The remaining solution can be discarded.     2 hours before your arrival time stop drinking all liquids, including water.   Do not smoke or swallow anything, including water or gum for at least 2 hours before your arrival time. This is a safety issue. Your procedure could be cancelled if you do not follow directions.  No chewing tobacco 6 hours prior to procedure arrival time.     You may take your necessary morning medications with sips of water (4 ounces).   Do not take diabetes medicine by mouth until after your exam.  If you have asthma, bring your inhalers.  Please perform your nebulizer treatments and airway clearance therapy in the morning prior to the procedure (if applicable).    Arrive with a responsible adult who can drive you home and stay with you for a minimum of 24 hours. The medications used during the procedure will make you sleepy, so you won't be able to drive yourself home.   You cannot use public transportation, ride-share services, or non-medical taxi services without a responsible caregiver. Medical transport services are okay, but a caregiver must be there to receive you at your destination.  Please check in with your  when you arrive. Drivers should stay on campus.     Expect to be at the procedure center for about 1.5-2.5 hours.    Do not wear jewelry (i.e. earrings, rings, necklaces, watches, etc.). Leave your purse, billfold, credit cards, and other valuables at home.      Bring insurance card and ID.       Answers to Commonly Asked Questions     How soon can I eat after the procedure?  You may resume your normal diet when you feel ready, unless advised otherwise by the doctor performing your procedure. We recommend starting with a light meal.   Do not drink alcohol for 24 hours after your procedure.  You may resume normal activities (work, exercise, etc.) after 24 hours.    How will I feel after the procedure?  It is normal to feel bloated and gassy after your procedure. Walking will help move the air through your colon. You can take non-aspirin pain relievers that contain acetaminophen (Tylenol).  If you are having sedation, we require a responsible adult to take you home for your safety. The sedation medicines used to relax you during the procedure can impair your judgement and reaction time and make you forgetful and possibly a little unsteady.  Do not drive, make any important decisions, or sign any legal documents for 24 hours after your procedure.    When will I get my test results?  You should have your procedure results and any lab results (if applicable) by letter, MyChart message, or phone call within 2 weeks. If you have any questions, please call the doctor that referred you for the procedure.    How do I know if my colon is cleaned out?   After completing the bowel prep, your bowel movements should be all liquid and yellow. Your bowel movements will look similar to urine in the toilet. If there are pieces of stool (poop) in the toilet, or if you can't see to the bottom of the toilet, please call our office for advice. Call [phone] and ask to speak with a nurse.    Why is the Golytely bowel prep taken in several steps?   The stool is flushed out by a large  wave of fluid going through the colon. Just sipping a large volume of the solution will not achieve the desired result. Studies have shown that two smaller waves (or more in some cases) are better than one large one.      What if I need to cancel or reschedule my procedure?  Contact our endoscopy scheduling team at [phone], option 2. Monday through Friday, 7:00am-5:00pm.

## 2024-07-05 NOTE — TELEPHONE ENCOUNTER
"Endoscopy Scheduling Screen    Have you had a positive Covid test in the last 14 days?  No    What is your communication preference for Instructions and/or Bowel Prep?   Mail/USPS    What insurance is in the chart?  Other:  BCBS    Ordering/Referring Provider:     DENIS JUAN      (If ordering provider performs procedure, schedule with ordering provider unless otherwise instructed. )    BMI: Estimated body mass index is 23.66 kg/m  as calculated from the following:    Height as of 6/14/24: 1.632 m (5' 4.27\").    Weight as of 6/14/24: 63 kg (139 lb).     Sedation Ordered  general anesthesia.   BMI<= 45 45 < BMI <= 48 48 < BMI < = 50  BMI > 50   No Restrictions No [facility]  No [facility]  [facility] with exceptions Hospital Only OR Only       Do you have a history of malignant hyperthermia?  No    (Females) Are you currently pregnant?        Have you been diagnosed or told you have pulmonary hypertension?   No    Do you have an LVAD?  No    Have you been told you have moderate to severe sleep apnea?  No    Have you been told you have COPD, asthma, or any other lung disease?  No    Do you have any heart conditions?  No     Have you ever had or are you waiting for an organ transplant?  No. Continue scheduling, no site restrictions.    Have you had a stroke or transient ischemic attack (TIA aka \"mini stroke\" in the last 6 months?   No    Have you been diagnosed with or been told you have cirrhosis of the liver?   No    Are you currently on dialysis?   No    Do you need assistance transferring?   No    BMI: Estimated body mass index is 23.66 kg/m  as calculated from the following:    Height as of 6/14/24: 1.632 m (5' 4.27\").    Weight as of 6/14/24: 63 kg (139 lb).     Is patients BMI > 40 and scheduling location [facility]?  No    Do you take an injectable medication for weight loss or diabetes (excluding insulin)?  No    Do you take the medication Naltrexone?  No    Do you take blood thinners?  No       Prep   Are you currently " on dialysis or do you have chronic kidney disease?  Yes (Golytely Prep)    Do you have a diagnosis of diabetes?  Yes (Golytely Prep)    Do you have a diagnosis of cystic fibrosis (CF)?  No    On a regular basis do you go 3 -5 days between bowel movements?  No    BMI > 40?  No    Preferred Pharmacy:    [id number] [place] [address] & [address] [address]  Phone: [phone] Fax: [phone]      Final Scheduling Details     Procedure scheduled  Colonoscopy    Surgeon:  SADA     Date of procedure:  10/30     Pre-OP / PAC:   Yes - Patient informed of pre-op requirement.    Location  [facility]  SEDATION    Sedation   General Anesthesia - Per order.      Patient Reminders:   You will receive a call from a Nurse to review instructions and health history.  This assessment must be completed prior to your procedure.  Failure to complete the Nurse assessment may result in the procedure being cancelled.      On the day of your procedure, please designate an adult(s) who can drive you home stay with you for the next 24 hours. The medicines used in the exam will make you sleepy. You will not be able to drive.      You cannot take public transportation, ride share services, or non-medical taxi service without a responsible caregiver.  Medical transport services are allowed with the requirement that a responsible caregiver will receive you at your destination.  We require that drivers and caregivers are confirmed prior to your procedure.

## 2024-07-17 ENCOUNTER — TELEPHONE (OUTPATIENT)
Dept: FAMILY MEDICINE | Facility: CLINIC | Age: 74
End: 2024-07-17
Payer: COMMERCIAL

## 2024-07-17 NOTE — LETTER
July 17, 2024      Devon Watts  [address] [address]                Dear Devon Watts,    Your clinic record indicates that you are due for an Annual Medicare Wellness Exam and chronic disease management. Please call the  at [phone] to schedule an appointment.     If you have questions about this letter please contact your provider.    Sincerely,    Your [facility] Team

## 2024-07-17 NOTE — TELEPHONE ENCOUNTER
Patient Quality Outreach    Patient is due for the following:   Hypertension -  Hypertension follow-up visit  Colon Cancer Screening  Physical Annual Wellness Visit  Chronic Opioid Use -  Treatment Agreement (CSA) and Urine Drug Screen    Next Steps:   Schedule a Annual Wellness Visit    Type of outreach:    Phone, left message for patient/parent to call back. and Sent letter.      Questions for provider review:    None           Ingrid King CMA  Chart routed to Care Team.

## 2024-07-25 ENCOUNTER — TELEPHONE (OUTPATIENT)
Dept: FAMILY MEDICINE | Facility: CLINIC | Age: 74
End: 2024-07-25
Payer: COMMERCIAL

## 2024-07-25 DIAGNOSIS — M75.41 IMPINGEMENT SYNDROME OF BOTH SHOULDERS: ICD-10-CM

## 2024-07-25 DIAGNOSIS — M75.42 IMPINGEMENT SYNDROME OF BOTH SHOULDERS: ICD-10-CM

## 2024-07-25 RX ORDER — TRAMADOL HYDROCHLORIDE 50 MG/1
50 TABLET ORAL EVERY 6 HOURS PRN
Qty: 30 TABLET | Status: CANCELLED | OUTPATIENT
Start: 2024-07-25

## 2024-07-25 NOTE — LETTER
"July 25, 2024      Devon Watts  [address] [address]        Dear ,    Normal for liver functions.  Normal for and electrolyte.     Kidney functions stable,     Total Cholesterol normal, and LDL normal     Continue with current medication.     Have a good day.    Wang Elizondo MD         Ways to improve your cholesterol...     1- Eats less saturated fats (including avoiding \"trans\" fats), fatty foods.  Eat more baked food, than fried food.     2 - Eat more unsaturated fats  - found in vegetables, grains, and tree nuts.  Also by replacing butter with canola oil or olive oil.     3 - Eat more nuts.  1-2 ounces (a small handful) of almonds, walnuts, hazelnuts or pecans once a day in place of other less healthy snacks.     4 - Eat more high fiber foods - vegetables and whole grains including oat bran, oats, beans, peas, and flax seed.     5 - Eat more fish - such as salmon, tuna, mackerel, and sardines.  1 or 2 six ounce servings per week is a healthy replacement for other proteins.     6 - Exercise for at least 120 minutes per week - which is equal to 30 minutes 4 days per week.     7- avoid late meals, at least  a few hours before bedtime.    Office Visit on 06/14/2024   Component Date Value Ref Range Status    Hemoglobin A1C 06/14/2024 7.3 (H)  0.0 - 5.6 % Final    Normal <5.7%   Prediabetes 5.7-6.4%    Diabetes 6.5% or higher     Note: Adopted from ADA consensus guidelines.    Creatinine Urine mg/dL 06/14/2024 152.0  mg/dL Final    The reference ranges have not been established in urine creatinine. The results should be integrated into the clinical context for interpretation.    Albumin Urine mg/L 06/14/2024 <12.0  mg/L Final    The reference ranges have not been established in urine albumin. The results should be integrated into the clinical context for interpretation.    Albumin Urine mg/g Cr 06/14/2024    Final    Unable to calculate, urine albumin and/or urine creatinine is " outside detectable limits.  Microalbuminuria is defined as an albumin:creatinine ratio of 17 to 299 for males and 25 to 299 for females. A ratio of albumin:creatinine of 300 or higher is indicative of overt proteinuria.  Due to biologic variability, positive results should be confirmed by a second, first-morning random or 24-hour timed urine specimen. If there is discrepancy, a third specimen is recommended. When 2 out of 3 results are in the microalbuminuria range, this is evidence for incipient nephropathy and warrants increased efforts at glucose control, blood pressure control, and institution of therapy with an angiotensin-converting-enzyme (ACE) inhibitor (if the patient can tolerate it).      Hold Specimen 06/14/2024 [facility]   Final    Hold Specimen 06/14/2024 [facility]   Final    Hold Specimen 06/14/2024 [facility]   Final    Cholesterol 06/14/2024 95  <200 mg/dL Final    Triglycerides 06/14/2024 41  <150 mg/dL Final    Direct Measure HDL 06/14/2024 43  >=40 mg/dL Final    LDL Cholesterol Calculated 06/14/2024 44  <=100 mg/dL Final    Non HDL Cholesterol 06/14/2024 52  <130 mg/dL Final    Sodium 06/14/2024 143  135 - 145 mmol/L Final    Reference intervals for this test were updated on 09/26/2023 to more accurately reflect our healthy population. There may be differences in the flagging of prior results with similar values performed with this method. Interpretation of those prior results can be made in the context of the updated reference intervals.     Potassium 06/14/2024 5.2  3.4 - 5.3 mmol/L Final    Chloride 06/14/2024 109 (H)  98 - 107 mmol/L Final    Carbon Dioxide (CO2) 06/14/2024 25  22 - 29 mmol/L Final    Anion Gap 06/14/2024 9  7 - 15 mmol/L Final    Urea Nitrogen 06/14/2024 14.4  8.0 - 23.0 mg/dL Final    Creatinine 06/14/2024 1.54 (H)  0.67 - 1.17 mg/dL Final    GFR Estimate 06/14/2024 47 (L)  >60 mL/min/1.73m2 Final    eGFR calculated using 2021 CKD-EPI equation.    Calcium 06/14/2024 9.3  8.8 - 10.2 mg/dL  Final    Glucose 06/14/2024 128 (H)  70 - 99 mg/dL Final

## 2024-07-25 NOTE — TELEPHONE ENCOUNTER
Patient calling stating wrong tramadol script was sent in at [facility] 6/14/24 with you. Patient states he normally in the past has taken Tramadol 1 50 mg PO every 6 hours as needed for pain and the one you sent in states 1x/day PRN.     Pharmacy needs a new script sent in reflecting every 6 hours if agreeable.     Patient states he is almost out of medication and only has 2 tabs left.       Current script   Disp Refills Start End GEORGINA   traMADol (ULTRAM) 50 MG tablet 30 tablet 2 6/14/2024 -- No   Sig: One tab at bedtime as needed   Sent to pharmacy as: traMADol HCl 50 MG Oral Tablet (ULTRAM)   Class: E-Prescribe   Order: [id number]   E-Prescribing Status: Receipt confirmed by pharmacy (6/14/2024 11:31 AM CDT)       Patients previous/usual script sent in by PCP.    Disp Refills Start End GEORGINA   traMADol (ULTRAM) 50 MG tablet (Discontinued) 30 tablet 0 5/23/2024 6/14/2024 No   Sig - Route: Take 1 tablet (50 mg) by mouth every 6 hours as needed for severe pain - Oral   Sent to pharmacy as: traMADol HCl 50 MG Oral Tablet (ULTRAM)     Hailey Bailey RN on 7/25/2024 at 12:24 PM

## 2024-07-25 NOTE — TELEPHONE ENCOUNTER
"Patient calling stating he was supposed to receive result note from provider and results of labs completed 6/14/24 in the mail and has not yet received them.     Please mail lab results and providers result note to patient. Address on file confirmed    Hailey Bailey RN on 7/25/2024 at 12:27 PM       Wang Elizondo MD  6/15/2024  8:54 AM CDT Back to Top      Please mail letter to patient with results     Normal for liver functions.  Normal for and electrolyte.     Kidney functions stable,     Total Cholesterol normal, and LDL normal     Continue with current medication.        Have a good day.     Ways to improve your cholesterol...     1- Eats less saturated fats (including avoiding \"trans\" fats), fatty foods.  Eat more baked food, than fried food.     2 - Eat more unsaturated fats  - found in vegetables, grains, and tree nuts.  Also by replacing butter with canola oil or olive oil.     3 - Eat more nuts.  1-2 ounces (a small handful) of almonds, walnuts, hazelnuts or pecans once a day in place of other less healthy snacks.     4 - Eat more high fiber foods - vegetables and whole grains including oat bran, oats, beans, peas, and flax seed.     5 - Eat more fish - such as salmon, tuna, mackerel, and sardines.  1 or 2 six ounce servings per week is a healthy replacement for other proteins.     6 - Exercise for at least 120 minutes per week - which is equal to 30 minutes 4 days per week.     7- avoid late meals, at least  a few hours before bedtime.     "

## 2024-08-14 RX ORDER — TRAMADOL HYDROCHLORIDE 50 MG/1
50 TABLET ORAL EVERY 6 HOURS PRN
Qty: 30 TABLET | Refills: 1 | Status: SHIPPED | OUTPATIENT
Start: 2024-08-26 | End: 2024-08-15

## 2024-08-14 NOTE — TELEPHONE ENCOUNTER
Patient calling back and is upset that this has not been addressed.  He stated that no one has adjusted his meds except Nicole Anthony and he has been on Tramadol Q6H PRN for awhile.   He did not recall discussing a dose decrease at recent visit with Wang Johnson     [place] 2 [place] on 8[place]23/24 and would like his refill straightened out isac Chase RN  [facility]

## 2024-08-14 NOTE — TELEPHONE ENCOUNTER
Patient interrupted this RN while I was elaborating on provider notes. He was frustrated because he will be leaving for [place] and will not be here to pick this up on 8/26/24. He will be leaving 8/23/24. He would like to fill this early. (He will be leaving for two months, according to previous RN note) Can he fill two month's worth of this medication early?     He stated he has been doing this for years. Stated he used to be able to purchase this over the counter. He continued to state he was going to stop by the clinic tomorrow. RN stated that he does not have an appointment, so it would not be helpful to stop by the clinic, and discouraged stopping into the clinic.      Please advise okay for early refill?     Amber Trevizo RN on 8/14/2024 at 4:46 PM

## 2024-08-14 NOTE — TELEPHONE ENCOUNTER
I filled his a prescription based on the previous refills.  His last visit with Dr. Magdaleno, on May 23 she gave him 30 tablet, where he is supposed to take 1 tablet daily as needed either at bedtime or during the daytime.  Previously,he was given 20 tablets, on May, 15.     And he previously was given 90 tablets.  However,  would last for 3 months.  Which mean he was taking 1 tablet a day.  His next prescription is not due until August, 26/2024. ( He filled 30 tab on 07/27/2024 )  I have sent a new refills for him.    Thanks

## 2024-08-15 RX ORDER — TRAMADOL HYDROCHLORIDE 50 MG/1
50 TABLET ORAL
Qty: 90 TABLET | Refills: 0 | Status: SHIPPED | OUTPATIENT
Start: 2024-08-22

## 2024-10-08 NOTE — TELEPHONE ENCOUNTER
Extended Golytely Bowel Prep  recommended due to chronic pain medication noted in chart.  Instructions were sent via letter. Bowel prep was sent 10/8/2024 to [id number] - [place] [id number] [facility] AT [place] [address] & [facility].

## 2024-10-17 ENCOUNTER — TELEPHONE (OUTPATIENT)
Dept: GASTROENTEROLOGY | Facility: CLINIC | Age: 74
End: 2024-10-17
Payer: COMMERCIAL

## 2024-10-18 NOTE — TELEPHONE ENCOUNTER
Pre visit planning completed.      Procedure details:    Patient scheduled for Colonoscopy on 10/30/24.     Arrival time: 1030. Procedure time 1130    Facility [facility] [place] [address]. Check in location: [address] [facility].     Sedation type: General    Pre op exam needed? Yes. Patient needs to complete an in person pre-operative exam within 30 days of procedure. Informed patient pre op is needed via letter.    Indication for procedure: screening      Chart review:     Electronic implanted devices? No    Recent diagnosis of diverticulitis within the last 6 weeks? No      Medication review:    Diabetic? Yes. Oral diabetic medications: Glipizide (glucotrol): HOLD day of procedure.  Metformin (glucophage): HOLD day of procedure.    Anticoagulants? No    Weight loss medication/injectable? No.    Other medication HOLDING recommendations:  N/A      Prep for procedure:     Bowel prep recommendation: Extended Golytely. Bowel prep prescription sent to      [facility][id number] [address] AT [place] [address] & [facility]   Due to: chronic pain medication noted in chart. , CKD noted. , and diabetes.     Prep instructions sent via letter CRC 10/8/24         Alyson Ayala RN  Endoscopy Procedure Pre Assessment RN  [phone] option 2

## 2024-10-18 NOTE — TELEPHONE ENCOUNTER
Attempted to contact patient in order to complete pre assessment questions.     Patient answered but he said it was not a good time and he will call back later.  Advised patient to return call to [phone] option 2.    Pre op exam needed? Yes. Patient needs to complete an in person pre-operative exam within 30 days of procedure. Informed patient pre op is needed via phone call. Verbally advised patient to schedule a pre op exam with his doctor. Patient verbalized understanding.       Shanell Roy RN  Endoscopy Procedure Pre Assessment

## 2024-10-22 NOTE — TELEPHONE ENCOUNTER
Second attempt to complete pre assessment.     No answer. Voicemail not set up - unable to leave a message.     Callback required communication not sent due to [employer] inactive and patient has no voicemail set up.    Pre op exam needed? Yes. Patient needs to complete an in person pre-operative exam within 30 days of procedure. Informed patient pre op is needed via letter. (by ). Patient was also advised of pre op requirement in the previous phone call.      Shanell Roy RN  Endoscopy Procedure Pre Assessment

## 2024-10-24 ENCOUNTER — TELEPHONE (OUTPATIENT)
Dept: GASTROENTEROLOGY | Facility: CLINIC | Age: 74
End: 2024-10-24
Payer: COMMERCIAL

## 2024-10-24 NOTE — TELEPHONE ENCOUNTER
Incoming call from patient to complete PA.     Writer asked patient if he has completed his preop prior to this procedure and patient seems confused did not know that he needed to have a pre-op.  Discussed with patient how he was informed of needing this 30 days prior to procedure when he scheduled this appointment and also in the letter that he received with instructions.  Informed patient that he will need to reschedule this appointment and then get scheduled for this pre-op.     Patient very understanding of this.  Warm transferred to scheduling to get this taken care of.    Radha Chakraborty RN, BSN  Endoscopy Procedure Pre Assessment RN   [phone] option 2

## 2024-10-24 NOTE — TELEPHONE ENCOUNTER
Caller: Devon    Reason for Reschedule/Cancellation   (please be detailed, any staff messages or encounters to note?): Did not complete pre-op      Prior to reschedule please review:  Ordering Provider: Wang Elizondo MD  Sedation Determined: General  Does patient have any ASC Exclusions, please identify?: n      Notes on Cancelled Procedure:  Procedure: Lower Endoscopy [Colonoscopy]   Date: 10/30/2024  Location: [facility]; [address] [place] [address] [place] [address]   Surgeon: Prasad      Rescheduled: Yes,   Procedure: Lower Endoscopy [Colonoscopy]    Date: 11/19/2024   Location: [facility]; [address] [place] [address] [place] [address]    Surgeon: Prasad   Sedation Level Scheduled  General ,  Reason for Sedation Level Order   Instructions updated and sent: Y     Does patient need PAC or Pre -Op Rescheduled? : N, but pt knows one needs to be completed       Did you cancel or rescheduled an EUS procedure? No.

## 2024-10-28 DIAGNOSIS — M75.42 IMPINGEMENT SYNDROME OF BOTH SHOULDERS: ICD-10-CM

## 2024-10-28 DIAGNOSIS — M75.41 IMPINGEMENT SYNDROME OF BOTH SHOULDERS: ICD-10-CM

## 2024-10-29 RX ORDER — TRAMADOL HYDROCHLORIDE 50 MG/1
50 TABLET ORAL EVERY 6 HOURS PRN
Qty: 90 TABLET | Refills: 0 | Status: SHIPPED | OUTPATIENT
Start: 2024-10-29

## 2024-11-01 NOTE — TELEPHONE ENCOUNTER
Attempted to contact patient in order to complete pre assessment questions.     No answer. Left message to return call to [phone] option 2.    Callback required communication sent via voicemail due to [employer] inactive. and secure [employer] email link.      Shanell Roy RN  Endoscopy Procedure Pre Assessment

## 2024-11-01 NOTE — TELEPHONE ENCOUNTER
RESCHEDULED PROCEDURE   Reason:Pt not able to get Pre-op done     Pre visit planning completed.      Procedure details:    Patient scheduled for Colonoscopy on 11-19-24.     Arrival time: 1220. Procedure time 1320    Facility location: [facility]; [facility] [place] [address]. Check in location: [address] [facility].     Sedation type: General    Pre op exam needed? Yes. Pre op exam is scheduled on 11-18-24 with Nicole Magdaleno DO.    Indication for procedure: screening      Chart review:     Electronic implanted devices? No    Recent diagnosis of diverticulitis within the last 6 weeks? No      Medication review:    Diabetic? Yes. Oral diabetic medications: Glipizide (glucotrol): HOLD day of procedure.  Metformin (glucophage): HOLD day of procedure.    Anticoagulants? No    Weight loss medication/injectable? No GLP-1 medication per patient's medication list.  RN will verify with pre-assessment call.    Other medication HOLDING recommendations:  N/A      Prep for procedure:     Bowel prep recommendation: Extended Golytely. Bowel prep prescription sent to      [facility] #[id number] [place] [address] & [facility]   Due to: chronic pain medication noted in chart. , CKD noted. , and diabetes.     Prep instructions sent via [employer] 10-8-24 CRC , and again via secure [employer] email link 11-1-24        Alyson Ayala RN  Endoscopy Procedure Pre Assessment RN  [phone] option 2

## 2024-11-04 NOTE — LETTER
Detail Level: Detailed May 7, 2024      Devon Watts  [address] [facility] [address]      Your healthcare team cares about your health. To provide you with the best care, we have reviewed your chart and based on our findings, we see that you are due to:     1) Schedule a DIABETIC FOLLOW UP appointment for Office Visit. Patients with diabetes should see their provider regularly.    2) PREVENTATIVE VISIT: Annual Medicare Wellness: Schedule an Annual Medicare Wellness Exam. Please [facility] to set up your appointment.    If you have already completed these items, please contact the clinic via phone or Mychart so your care team can review and update your records.  Thank you for choosing [facility] for your healthcare needs. For any questions, concerns, or to schedule an appointment please contact the clinic.     Healthy Regards,    Your [facility] Care Team   Field Number: 1 Lesion Dimensions-X Axis In Cm: 0.5 Shield Size In Cm: 1.5 x 1.5 Applicator Size In Cm: 2.5 cm Energy (Kv): 70 Treatment Time (Min): 0.41 Time, Dose, Fractionation Factor (Tdf) For Prescription 1: 98 Daily Dose (Cgy): 275.52 Number Of Fractions For Prescription 1: 20 Treatments Per Week: 3 Total Dose For Prescription 1 (Cgy): 5510.40 Add A Second Prescription?: No Additional Fraction(S) Needed:: 0 Bill For Physics Consultation: No - Render Text Only Physics Documentation: (Physics Check Verbiage) Field Number: 2 Lesion Dimensions-Y Axis In Cm: 1.5 Shield Size In Cm: 2.0 x 2.5

## 2024-11-05 NOTE — TELEPHONE ENCOUNTER
Second call attempt to complete pre assessment.     No answer.  Left message to return call to [phone] #2 by next business day prior to 4PM or procedure will be sent to cancel.     Callback required communication sent via [employer] secure email link, Pt does not have [employer]    Pre-op needed? Yes. Pre op exam is scheduled on Pre op exam is scheduled on 11-18-24 with Nicole Magdaleno DO.    Alyson Ayala RN  Endoscopy Procedure Pre Assessment

## 2024-11-07 NOTE — TELEPHONE ENCOUNTER
No return call received.   Pre assessment was not completed for upcoming scheduled procedure.     Pre op exam is scheduled on 11-18-24 with Nicole Magdaleno DO.     Per Disha Lei ( endo ) preference, did NOT send for cancel as patient already has a pre op exam scheduled.  FYI email sent to Disha that patient did not call us back for pre assessment and patient remains on schedule.       Shanell Roy RN  Endoscopy Procedure Pre-Assessment RN  [phone], option 2

## 2024-11-13 ENCOUNTER — TELEPHONE (OUTPATIENT)
Dept: FAMILY MEDICINE | Facility: CLINIC | Age: 74
End: 2024-11-13
Payer: COMMERCIAL

## 2024-11-18 ENCOUNTER — OFFICE VISIT (OUTPATIENT)
Dept: FAMILY MEDICINE | Facility: CLINIC | Age: 74
End: 2024-11-18
Payer: COMMERCIAL

## 2024-11-18 VITALS
HEART RATE: 116 BPM | WEIGHT: 135.6 LBS | RESPIRATION RATE: 20 BRPM | BODY MASS INDEX: 23.15 KG/M2 | TEMPERATURE: 97.9 F | SYSTOLIC BLOOD PRESSURE: 144 MMHG | DIASTOLIC BLOOD PRESSURE: 74 MMHG | OXYGEN SATURATION: 98 % | HEIGHT: 64 IN

## 2024-11-18 DIAGNOSIS — E11.22 TYPE 2 DIABETES MELLITUS WITH STAGE 3 CHRONIC KIDNEY DISEASE, WITHOUT LONG-TERM CURRENT USE OF INSULIN, UNSPECIFIED WHETHER STAGE 3A OR 3B CKD (H): ICD-10-CM

## 2024-11-18 DIAGNOSIS — Z01.818 PREOP GENERAL PHYSICAL EXAM: Primary | ICD-10-CM

## 2024-11-18 DIAGNOSIS — N18.30 STAGE 3 CHRONIC KIDNEY DISEASE, UNSPECIFIED WHETHER STAGE 3A OR 3B CKD (H): ICD-10-CM

## 2024-11-18 DIAGNOSIS — I10 BENIGN ESSENTIAL HYPERTENSION: ICD-10-CM

## 2024-11-18 DIAGNOSIS — E78.5 HYPERLIPIDEMIA WITH TARGET LDL LESS THAN 100: ICD-10-CM

## 2024-11-18 DIAGNOSIS — N18.30 TYPE 2 DIABETES MELLITUS WITH STAGE 3 CHRONIC KIDNEY DISEASE, WITHOUT LONG-TERM CURRENT USE OF INSULIN, UNSPECIFIED WHETHER STAGE 3A OR 3B CKD (H): ICD-10-CM

## 2024-11-18 DIAGNOSIS — M76.51 PATELLAR TENDINITIS OF RIGHT KNEE: ICD-10-CM

## 2024-11-18 DIAGNOSIS — F11.90 CHRONIC, CONTINUOUS USE OF OPIOIDS: ICD-10-CM

## 2024-11-18 DIAGNOSIS — E03.4 HYPOTHYROIDISM DUE TO ACQUIRED ATROPHY OF THYROID: ICD-10-CM

## 2024-11-18 LAB
ANION GAP SERPL CALCULATED.3IONS-SCNC: 13 MMOL/L (ref 7–15)
BUN SERPL-MCNC: 28 MG/DL (ref 8–23)
CALCIUM SERPL-MCNC: 9.6 MG/DL (ref 8.8–10.4)
CHLORIDE SERPL-SCNC: 108 MMOL/L (ref 98–107)
CREAT SERPL-MCNC: 1.78 MG/DL (ref 0.67–1.17)
CREAT UR-MCNC: 128 MG/DL
EGFRCR SERPLBLD CKD-EPI 2021: 40 ML/MIN/1.73M2
EST. AVERAGE GLUCOSE BLD GHB EST-MCNC: 154 MG/DL
GLUCOSE SERPL-MCNC: 208 MG/DL (ref 70–99)
HBA1C MFR BLD: 7 % (ref 0–5.6)
HCO3 SERPL-SCNC: 23 MMOL/L (ref 22–29)
HGB BLD-MCNC: 11.6 G/DL (ref 13.3–17.7)
HOLD SPECIMEN: NORMAL
HOLD SPECIMEN: NORMAL
POTASSIUM SERPL-SCNC: 4.7 MMOL/L (ref 3.4–5.3)
SODIUM SERPL-SCNC: 144 MMOL/L (ref 135–145)
TSH SERPL DL<=0.005 MIU/L-ACNC: 1.74 UIU/ML (ref 0.3–4.2)

## 2024-11-18 PROCEDURE — 91320 SARSCV2 VAC 30MCG TRS-SUC IM: CPT | Performed by: FAMILY MEDICINE

## 2024-11-18 PROCEDURE — 99214 OFFICE O/P EST MOD 30 MIN: CPT | Mod: 25 | Performed by: FAMILY MEDICINE

## 2024-11-18 PROCEDURE — 85018 HEMOGLOBIN: CPT | Performed by: FAMILY MEDICINE

## 2024-11-18 PROCEDURE — 36415 COLL VENOUS BLD VENIPUNCTURE: CPT | Performed by: FAMILY MEDICINE

## 2024-11-18 PROCEDURE — 84443 ASSAY THYROID STIM HORMONE: CPT | Performed by: FAMILY MEDICINE

## 2024-11-18 PROCEDURE — 80048 BASIC METABOLIC PNL TOTAL CA: CPT | Performed by: FAMILY MEDICINE

## 2024-11-18 PROCEDURE — 90480 ADMN SARSCOV2 VAC 1/ONLY CMP: CPT | Performed by: FAMILY MEDICINE

## 2024-11-18 PROCEDURE — 83036 HEMOGLOBIN GLYCOSYLATED A1C: CPT | Performed by: FAMILY MEDICINE

## 2024-11-18 PROCEDURE — 90471 IMMUNIZATION ADMIN: CPT | Performed by: FAMILY MEDICINE

## 2024-11-18 PROCEDURE — 90662 IIV NO PRSV INCREASED AG IM: CPT | Performed by: FAMILY MEDICINE

## 2024-11-18 RX ORDER — IBUPROFEN 400 MG/1
400 TABLET, FILM COATED ORAL 2 TIMES DAILY WITH MEALS
Qty: 10 TABLET | Refills: 0 | Status: SHIPPED | OUTPATIENT
Start: 2024-11-18

## 2024-11-18 RX ORDER — GLIPIZIDE 5 MG/1
5 TABLET, FILM COATED, EXTENDED RELEASE ORAL DAILY
Qty: 90 TABLET | Refills: 1 | Status: SHIPPED | OUTPATIENT
Start: 2024-11-18

## 2024-11-18 ASSESSMENT — PAIN SCALES - GENERAL: PAINLEVEL_OUTOF10: WORST PAIN (10)

## 2024-11-18 NOTE — LETTER

## 2024-11-18 NOTE — PATIENT INSTRUCTIONS
How to Take Your Medication Before Surgery  Preoperative Medication Instructions   Antiplatelet or Anticoagulation Medication Instructions   - aspirin: Discontinue aspirin 7-10 days prior to procedure to reduce bleeding risk. It should be resumed postoperatively.     Additional Medication Instructions   - ACE/ARB: Continue without modification (e.g., MAC anesthesia, neurosurgery, spine surgery, heart failure, or labile hypertension with risk of hypertension).   - Statins: Continue taking on the day of surgery.    - metformin: DO NOT TAKE day of surgery.   - sulfonylurea (e.g. glyburide, glipizide): DO NOT TAKE day of surgery       Patient Education   Preparing for Your Surgery  For Adults  Getting started  In most cases, a nurse will call to review your health history and instructions. They will give you an arrival time based on your scheduled surgery time. Please be ready to share:  Your doctor's clinic name and phone number  Your medical, surgical, and anesthesia history  A list of allergies and sensitivities  A list of medicines, including herbal treatments and over-the-counter drugs  Whether the patient has a legal guardian (ask how to send us the papers in advance)  Note: You may not receive a call if you were seen at our PAC (Preoperative Assessment Center).  Please tell us if you're pregnant--or if there's any chance you might be pregnant. Some surgeries may injure a fetus (unborn baby), so they require a pregnancy test. Surgeries that are safe for a fetus don't always need a test, and you can choose whether to have one.   Preparing for surgery  Within 10 to 30 days of surgery: Have a pre-op exam (sometimes called an H&P, or History and Physical). This can be done at a clinic or pre-operative center.  If you're having a , you may not need this exam. Talk to your care team.  At your pre-op exam, talk to your care team about all medicines you take. (This includes CBD oil and any drugs, such as THC,  marijuana, and other forms of cannabis.) If you need to stop any medicine before surgery, ask when to start taking it again.  This is for your safety. Many medicines and drugs can make you bleed too much during surgery. Some change how well surgery (anesthesia) drugs work.  Call your insurance company to let them know you're having surgery. (If you don't have insurance, call [phone].)  Call your clinic if there's any change in your health. This includes a scrape or scratch near the surgery site, or any signs of a cold (sore throat, runny nose, cough, rash, fever).  Eating and drinking guidelines  For your safety: Unless your surgeon tells you otherwise, follow the guidelines below.  Eat and drink as normal until 8 hours before you arrive for surgery. After that, no food or milk. You can spit out gum when you arrive.  Drink clear liquids until 2 hours before you arrive. These are liquids you can see through, like water, Gatorade, and Propel Water. They also include plain black coffee and tea (no cream or milk).  No alcohol for 24 hours before you arrive. The night before surgery, stop any drinks that contain THC.  If your care team tells you to take medicine on the morning of surgery, it's okay to take it with a sip of water. No other medicines or drugs are allowed (including CBD oil)--follow your care team's instructions.  If you have questions the day of surgery, call your hospital or surgery center.   Preventing infection  Shower or bathe the night before and the morning of surgery. Follow the instructions your clinic gave you. (If no instructions, use regular soap.)  Don't shave or clip hair near your surgery site. We'll remove the hair if needed.  Don't smoke or vape the morning of surgery. No chewing tobacco for 6 hours before you arrive. A nicotine patch is okay. You may spit out nicotine gum when you arrive.  For some surgeries, the surgeon will tell you to fully quit smoking and nicotine.  We will make  every effort to keep you safe from infection. We will:  Clean our hands often with soap and water (or an alcohol-based hand rub).  Clean the skin at your surgery site with a special soap that kills germs.  Give you a special gown to keep you warm. (Cold raises the risk of infection.)  Wear hair covers, masks, gowns, and gloves during surgery.  Give antibiotic medicine, if prescribed. Not all surgeries need this medicine.  What to bring on the day of surgery  Photo ID and insurance card  Copy of your health care directive, if you have one  Glasses and hearing aids (bring cases)  You can't wear contacts during surgery  Inhaler and eye drops, if you use them (tell us about these when you arrive)  CPAP machine or breathing device, if you use them  A few personal items, if spending the night  If you have . . .  A pacemaker, ICD (cardiac defibrillator), or other implant: Bring the ID card.  An implanted stimulator: Bring the remote control.  A legal guardian: Bring a copy of the certified (court-stamped) guardianship papers.  Please remove any jewelry, including body piercings. Leave jewelry and other valuables at home.  If you're going home the day of surgery  You must have a responsible adult drive you home. They should stay with you overnight as well.  If you don't have someone to stay with you, and you aren't safe to go home alone, we may keep you overnight. Insurance often won't pay for this.  After surgery  If it's hard to control your pain or you need more pain medicine, please call your surgeon's office.  Questions?   If you have any questions for your care team, list them here:   ____________________________________________________________________________________________________________________________________________________________________________________________________________________________________________________________  For informational purposes only. Not to replace the advice of your health care  provider. Copyright   2003, 2019 [facility]. All rights reserved. Clinically reviewed by Milan Freire MD. [employer] [id number] - REV 08/24.

## 2024-11-18 NOTE — PROGRESS NOTES
Preoperative Evaluation  [facility]  [address] [place] [id number]  Phone: [phone]  Primary Provider: Nicole Magdaleno DO  Pre-op Performing Provider: Nicole Magdaleno DO  Nov 18, 2024 11/18/2024   Surgical Information   What procedure is being done? Colonoscopy    Facility or Hospital where procedure/surgery will be performed: [facility]    Who is doing the procedure / surgery? Dr. Joselyn Parham    Date of surgery / procedure: TBD    Time of surgery / procedure: TBD    Where do you plan to recover after surgery? at home with family        Patient-reported     Fax number for surgical facility: Note does not need to be faxed, will be available electronically in Epic.    Assessment & Plan     The proposed surgical procedure is considered LOW risk.    Preop general physical exam      Type 2 diabetes mellitus with stage 3 chronic kidney disease, without long-term current use of insulin, unspecified whether stage 3a or 3b CKD (H)  The current medical regimen is effective;  continue present plan and medications.    - Hemoglobin; Future  - HEMOGLOBIN A1C; Future  - HEMOGLOBIN A1C  - metFORMIN (GLUCOPHAGE) 1000 MG tablet; Take 1 tablet (1,000 mg) by mouth 2 times daily (with meals). Do not refill until contacted by the patient  - glipiZIDE (GLUCOTROL XL) 5 MG 24 hr tablet; Take 1 tablet (5 mg) by mouth daily.    Hypothyroidism due to acquired atrophy of thyroid  The current medical regimen is effective;  continue present plan and medications.    - TSH WITH FREE T4 REFLEX; Future    Hyperlipidemia with target LDL less than 100  The current medical regimen is effective;  continue present plan and medications.      Benign essential hypertension  Blood pressure is up a little bit today.  It is better when he checks it at home  - Basic metabolic panel  (Ca, Cl, CO2, Creat, Gluc, K, Na, BUN); Future    Stage 3 chronic kidney disease, unspecified whether stage 3a  or 3b CKD (H)    - Basic metabolic panel  (Ca, Cl, CO2, Creat, Gluc, K, Na, BUN); Future    Patellar tendinitis of right knee  We will cautiously do 5 days of ibuprofen for tendinitis.  - ibuprofen (ADVIL/MOTRIN) 400 MG tablet; Take 1 tablet (400 mg) by mouth 2 times daily (with meals).    Chronic, continuous use of opioids    - [id number] - Urine Drug Confirmation Panel (Comprehensive); Future           Risks and Recommendations  The patient has the following additional risks and recommendations for perioperative complications:  Cardiovascular:   -   Diabetes:  - Patient is not on insulin therapy: regular NPO guidelines can be followed.   Anemia/Bleeding/Clotting:    - Anemia and does not require treatment prior to surgery. Monitor hemoglobin postoperatively    Antiplatelet or Anticoagulation Medication Instructions   - aspirin: Discontinue aspirin 7-10 days prior to procedure to reduce bleeding risk. It should be resumed postoperatively.     Additional Medication Instructions   - ACE/ARB: Continue without modification (e.g., MAC anesthesia, neurosurgery, spine surgery, heart failure, or labile hypertension with risk of hypertension).   - metformin: DO NOT TAKE day of surgery.   - sulfonylurea (e.g. glyburide, glipizide): DO NOT TAKE day of surgery    Recommendation  Approval given to proceed with proposed procedure, without further diagnostic evaluation.    Xenia Osorio is a 73 year old, presenting for the following:  Pre-Op Exam (Date TBD )          11/18/2024    12:26 PM   Additional Questions   Roomed by Zoe MILLRE   Accompanied by self         11/18/2024    12:26 PM   Patient Reported Additional Medications   Patient reports taking the following new medications none     HPI related to upcoming procedure: The patient had a colonoscopy in 2017 which showed a sessile adenomatous polyp.  He has not returned for follow-up colonoscopy until now.  He is 2 years overdue.  He has a history of mild anemia.      He hit  his knee cap three days ago on a table.  He denies falling.  It has been hard to bend the knee since.  It is a little swollen and a little red.   It is not improving.   It was a direct strike without a twist.   He is having trouble walking.           11/18/2024   Pre-Op Questionnaire   Have you ever had a heart attack or stroke? No    Have you ever had surgery on your heart or blood vessels, such as a stent placement, a coronary artery bypass, or surgery on an artery in your head, neck, heart, or legs? No    Do you have chest pain with activity? No    Do you have a history of heart failure? No    Do you currently have a cold, bronchitis or symptoms of other infection? No    Do you have a cough, shortness of breath, or wheezing? No    Do you or anyone in your family have previous history of blood clots? No    Do you or does anyone in your family have a serious bleeding problem such as prolonged bleeding following surgeries or cuts? No    Have you ever had problems with anemia or been told to take iron pills? (!) YES in the past     Have you had any abnormal blood loss such as black, tarry or bloody stools? No    Have you ever had a blood transfusion? No    Are you willing to have a blood transfusion if it is medically needed before, during, or after your surgery? Yes    Have you or any of your relatives ever had problems with anesthesia? No    Do you have sleep apnea, excessive snoring or daytime drowsiness? No    Do you have any artifical heart valves or other implanted medical devices like a pacemaker, defibrillator, or continuous glucose monitor? No    Do you have artificial joints? No    Are you allergic to latex? No        Patient-reported     Health Care Directive  Patient does not have a Health Care Directive:     Preoperative Review of    reviewed - controlled substances reflected in medication list.      Status of Chronic Conditions:  DIABETES - Patient has a longstanding history of DiabetesType Type  II . Patient is being treated with oral agents and denies significant side effects. Control has been good. Complicating factors include but are not limited to: chronic kidney disease.     HYPERLIPIDEMIA - Patient has a long history of significant Hyperlipidemia requiring medication for treatment with recent good control. Patient reports no problems or side effects with the medication.     HYPERTENSION - Patient has longstanding history of HTN , currently denies any symptoms referable to elevated blood pressure. Specifically denies chest pain, palpitations, dyspnea, orthopnea, PND or peripheral edema. Blood pressure readings have not been in normal range. Current medication regimen is as listed below. Patient denies any side effects of medication.     HYPOTHYROIDISM - Patient has a longstanding history of chronic Hypothyroidism. Patient has been doing well, noting no tremor, insomnia, hair loss or changes in skin texture. Continues to take medications as directed, without adverse reactions or side effects. Last TSH   Lab Results   Component Value Date    TSH 1.74 11/19/2019   .      RENAL INSUFFICIENCY - Patient has a longstanding history of moderate-severe chronic renal insufficiency. Last Cr   Creatinine   Date Value Ref Range Status   06/14/2024 1.54 (H) 0.67 - 1.17 mg/dL Final   03/08/2021 1.07 0.66 - 1.25 mg/dL Final     .     Patient Active Problem List    Diagnosis Date Noted    Type 2 diabetes mellitus with stage 3 chronic kidney disease, without long-term current use of insulin, unspecified whether stage 3a or 3b CKD (H) 02/13/2022     Priority: Medium    Chronic, continuous use of opioids 05/03/2021     Priority: Medium     Patient is followed by Nicole Magdaleno DO for ongoing prescription of pain medication.  All refills should only be approved by this provider, or covering partner.    Medication(s): Tramadol.   Maximum quantity per month: 90 for 3 months  Clinic visit frequency required: Q 6  months   PDMP  Review         Value Time User    State PDMP site checked  Yes 6/7/2022  4:04 PM Nicole Magdaleno DO          Controlled substance agreement:  CSA -- Patient Level:    Controlled Substance Agreement - Opioid - Scan on 7/23/2021  4:51 PM  Controlled Substance Agreement - Opioid - Scan on 1/3/2019  3:13 PM       Pain Clinic evaluation in the past: No    Opioid Risk Tool Total Score(s):  No flowsheet data found.        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  No past surgical history on file.  Current Outpatient Medications   Medication Sig Dispense Refill    aspirin 81 MG tablet Take 81 mg by mouth daily      atorvastatin (LIPITOR) 10 MG tablet TAKE 1 TABLET(10 MG) BY MOUTH DAILY 90 tablet 3    bisacodyl (DULCOLAX) 5 MG EC tablet Two days prior to exam take two (2) tablets at 4pm. One day prior to exam take two (2) tablets at 4pm 4 tablet 0    glipiZIDE (GLUCOTROL XL) 5 MG 24 hr tablet TAKE 1 TABLET(5 MG) BY MOUTH DAILY 90 tablet 1    lisinopril (ZESTRIL) 20 MG tablet Take 1 tablet (20 mg) by mouth daily 90 tablet 3    metFORMIN (GLUCOPHAGE) 1000 MG tablet Take 1 tablet (1,000 mg) by mouth 2 times daily (with meals) Do not refill until contacted by the patient 360 tablet 1    polyethylene glycol (GOLYTELY) 236 g suspension Two days before procedure at 5PM fill first container with water. Mix and drink an 8 oz glass every 15 minutes until HALF of the container is gone. Place the remainder in the refrigerator. One day before procedure at 5PM drink second half of bowel prep. Drink an 8 oz glass every 15 minutes until it  "is gone. Day of procedure 6 hours before arrival time fill the 2nd container with water. Mix and drink an 8 oz glass every 15 minutes until HALF of the container is gone. Discard the remaining solution. 8000 mL 0    traMADol (ULTRAM) 50 MG tablet Take 1 tablet (50 mg) by mouth every 6 hours as needed for severe pain. 90 tablet 0       No Known Allergies     Social History     Tobacco Use    Smoking status: Never     Passive exposure: Never    Smokeless tobacco: Never   Substance Use Topics    Alcohol use: No       History   Drug Use No             Review of Systems  Constitutional, HEENT, cardiovascular, pulmonary, gi and gu systems are negative, except as otherwise noted.    Objective    BP (!) 150/80 (BP Location: Right arm, Patient Position: Sitting, Cuff Size: Adult Regular)   Pulse 116   Temp 97.9  F (36.6  C) (Oral)   Resp 20   Ht 1.626 m (5' 4\")   Wt 61.5 kg (135 lb 9.6 oz)   SpO2 98%   BMI 23.28 kg/m     Estimated body mass index is 23.28 kg/m  as calculated from the following:    Height as of this encounter: 1.626 m (5' 4\").    Weight as of this encounter: 61.5 kg (135 lb 9.6 oz).  Physical Exam  GENERAL: alert and no distress  EYES: Eyes grossly normal to inspection, PERRL and conjunctivae and sclerae normal  HENT: ear canals and TM's normal, nose and mouth without ulcers or lesions  NECK: no adenopathy, no asymmetry, masses, or scars  RESP: lungs clear to auscultation - no rales, rhonchi or wheezes  CV: regular rate and rhythm, normal S1 S2, no S3 or S4, no murmur, click or rub, no peripheral edema  ABDOMEN: soft, nontender, no hepatosplenomegaly, no masses and bowel sounds normal  MS:  knees bilaterally full painless ROM, no joint line tenderness, no tenderness over collateral ligaments, negative anterior and posterior drawer tests, full stability lateral and medial stress, hips full painless ROM, exquisite tenderness over the right patellar tendon worse at points of insertion  SKIN: no suspicious " lesions or rashes  NEURO: Normal strength and tone, mentation intact and speech normal  PSYCH: mentation appears normal, affect normal/bright    Recent Labs   Lab Test 06/14/24  0950      POTASSIUM 5.2   CR 1.54*   A1C 7.3*        Diagnostics  Labs pending at this time.  Results will be reviewed when available.   No EKG required for low risk surgery (cataract, skin procedure, breast biopsy, etc).    Revised Cardiac Risk Index (RCRI)  The patient has the following serious cardiovascular risks for perioperative complications:   - No serious cardiac risks = 0 points     RCRI Interpretation: 0 points: Class I (very low risk - 0.4% complication rate)         Signed Electronically by: Nicole Magdaleno DO  A copy of this evaluation report is provided to the requesting physician.

## 2024-11-19 ENCOUNTER — ANESTHESIA EVENT (OUTPATIENT)
Dept: GASTROENTEROLOGY | Facility: CLINIC | Age: 74
End: 2024-11-19

## 2024-11-19 ENCOUNTER — ANESTHESIA (OUTPATIENT)
Dept: GASTROENTEROLOGY | Facility: CLINIC | Age: 74
End: 2024-11-19

## 2024-11-19 RX ORDER — LIDOCAINE 40 MG/G
CREAM TOPICAL
Status: CANCELLED | OUTPATIENT
Start: 2024-11-19

## 2024-11-19 RX ORDER — ONDANSETRON 2 MG/ML
4 INJECTION INTRAMUSCULAR; INTRAVENOUS
Status: CANCELLED | OUTPATIENT
Start: 2024-11-19

## 2024-11-19 NOTE — ANESTHESIA PREPROCEDURE EVALUATION
"Anesthesia Pre-Procedure Evaluation    Patient: Devon Watts   MRN: [id number] : 1950        Procedure : Procedure(s):  Colonoscopy          No past medical history on file.   No past surgical history on file.   No Known Allergies   Social History     Tobacco Use    Smoking status: Never     Passive exposure: Never    Smokeless tobacco: Never   Substance Use Topics    Alcohol use: No      Wt Readings from Last 1 Encounters:   24 61.5 kg (135 lb 9.6 oz)        Anesthesia Evaluation            ROS/MED HX  ENT/Pulmonary:       Neurologic:       Cardiovascular:     (+) Dyslipidemia hypertension- -   -  - -                                      METS/Exercise Tolerance:     Hematologic:       Musculoskeletal: Comment: Impingement syndrome of both shoulders;      GI/Hepatic:       Renal/Genitourinary:     (+) renal disease, type: CRI,            Endo:     (+)  type II DM,       Diabetic complications: nephropathy. thyroid problem, hypothyroidism,           Psychiatric/Substance Use:     (+)    H/O chronic opiod use .     Infectious Disease:       Malignancy:       Other:               OUTSIDE LABS:  CBC:   Lab Results   Component Value Date    WBC 6.3 2022    WBC 7.5 2017    HGB 11.6 (L) 2024    HGB 11.6 (L) 2023    HCT 35.2 (L) 2022    HCT 36.4 (L) 2017     2022     2017     BMP:   Lab Results   Component Value Date     2024     2024    POTASSIUM 4.7 2024    POTASSIUM 5.2 2024    CHLORIDE 108 (H) 2024    CHLORIDE 109 (H) 2024    CO2 23 2024    CO2 25 2024    BUN 28.0 (H) 2024    BUN 14.4 2024    CR 1.78 (H) 2024    CR 1.54 (H) 2024     (H) 2024     (H) 2024     COAGS: No results found for: \"PTT\", \"INR\", \"FIBR\"  POC: No results found for: \"BGM\", \"HCG\", \"HCGS\"  HEPATIC: No results found for: \"ALBUMIN\", \"PROTTOTAL\", \"ALT\", \"AST\", \"GGT\", " "\"ALKPHOS\", \"BILITOTAL\", \"BILIDIRECT\", \"PATRICK\"  OTHER:   Lab Results   Component Value Date    A1C 7.0 (H) 11/18/2024    STORMY 9.6 11/18/2024    TSH 1.74 11/18/2024       Anesthesia Plan    ASA Status:  2       Anesthesia Type: MAC.              Consents    Anesthesia Plan(s) and associated risks, benefits, and realistic alternatives discussed. Questions answered and patient/representative(s) expressed understanding.     - Discussed:     - Discussed with:  Patient            Postoperative Care       PONV prophylaxis: Ondansetron (or other 5HT-3)     Comments:               CRISTIAN HITCHCOCK MD    I have reviewed the pertinent notes and labs in the chart from the past 30 days and (re)examined the patient.  Any updates or changes from those notes are reflected in this note.      # Hyperchloremia: Highest Cl = 108 mmol/L in last 2 days, will monitor as appropriate              # Hypertension: Noted on problem list          # DMII: A1C = 7.0 % (Ref range: 0.0 - 5.6 %) within past 6 months              "

## 2024-11-21 LAB
N-NORTRAMADOL/CREAT UR CFM: ABNORMAL NG/MG{CREAT}
O-NORTRAMADOL UR CFM-MCNC: ABNORMAL NG/ML
TRAMADOL CTO UR CFM-MCNC: ABNORMAL NG/ML
TRAMADOL/CREAT UR: ABNORMAL

## 2024-11-22 ENCOUNTER — ANCILLARY PROCEDURE (OUTPATIENT)
Dept: GENERAL RADIOLOGY | Facility: CLINIC | Age: 74
End: 2024-11-22
Attending: PHYSICIAN ASSISTANT
Payer: COMMERCIAL

## 2024-11-22 DIAGNOSIS — M25.561 ACUTE PAIN OF RIGHT KNEE: ICD-10-CM

## 2024-11-22 PROCEDURE — 73562 X-RAY EXAM OF KNEE 3: CPT | Mod: TC | Performed by: RADIOLOGY

## 2024-12-23 ENCOUNTER — LAB (OUTPATIENT)
Dept: LAB | Facility: CLINIC | Age: 74
End: 2024-12-23
Payer: COMMERCIAL

## 2024-12-23 DIAGNOSIS — N18.30 STAGE 3 CHRONIC KIDNEY DISEASE, UNSPECIFIED WHETHER STAGE 3A OR 3B CKD (H): ICD-10-CM

## 2024-12-23 DIAGNOSIS — D64.9 ANEMIA, UNSPECIFIED TYPE: ICD-10-CM

## 2024-12-23 LAB
ANION GAP SERPL CALCULATED.3IONS-SCNC: 10 MMOL/L (ref 7–15)
BUN SERPL-MCNC: 32.1 MG/DL (ref 8–23)
CALCIUM SERPL-MCNC: 9.5 MG/DL (ref 8.8–10.4)
CHLORIDE SERPL-SCNC: 108 MMOL/L (ref 98–107)
CREAT SERPL-MCNC: 1.55 MG/DL (ref 0.67–1.17)
EGFRCR SERPLBLD CKD-EPI 2021: 46 ML/MIN/1.73M2
ERYTHROCYTE [DISTWIDTH] IN BLOOD BY AUTOMATED COUNT: 11.7 % (ref 10–15)
GLUCOSE SERPL-MCNC: 197 MG/DL (ref 70–99)
HCO3 SERPL-SCNC: 25 MMOL/L (ref 22–29)
HCT VFR BLD AUTO: 34.7 % (ref 40–53)
HGB BLD-MCNC: 11.4 G/DL (ref 13.3–17.7)
MCH RBC QN AUTO: 33.3 PG (ref 26.5–33)
MCHC RBC AUTO-ENTMCNC: 32.9 G/DL (ref 31.5–36.5)
MCV RBC AUTO: 102 FL (ref 78–100)
PLATELET # BLD AUTO: 213 10E3/UL (ref 150–450)
POTASSIUM SERPL-SCNC: 4.8 MMOL/L (ref 3.4–5.3)
RBC # BLD AUTO: 3.42 10E6/UL (ref 4.4–5.9)
SODIUM SERPL-SCNC: 143 MMOL/L (ref 135–145)
WBC # BLD AUTO: 5.9 10E3/UL (ref 4–11)

## 2024-12-23 PROCEDURE — 36415 COLL VENOUS BLD VENIPUNCTURE: CPT

## 2024-12-23 PROCEDURE — 85027 COMPLETE CBC AUTOMATED: CPT

## 2024-12-23 PROCEDURE — 80048 BASIC METABOLIC PNL TOTAL CA: CPT

## 2024-12-24 ENCOUNTER — TELEPHONE (OUTPATIENT)
Dept: FAMILY MEDICINE | Facility: CLINIC | Age: 74
End: 2024-12-24
Payer: COMMERCIAL

## 2024-12-24 NOTE — TELEPHONE ENCOUNTER
Attempt # 1 to reach patient.     Left VM to return call to clinic     ----- Message from Nicole Magdaleno sent at 12/24/2024  7:42 AM CST -----  Please let this patient know kidney function has improved since last month.  He should continue to have high fluid intake and we will recheck this in about 6 months.     Lona Caires DO Christine M Klisch, RN

## 2024-12-24 NOTE — LETTER
January 2, 2025        Devon Watts  [address] [facility] [address]          Dear ,    We are writing to inform you of your test results. An RN has been trying to contact you without success.    Your kidney function has improved since last month. You should continue to have high fluid intake and we will recheck this in about 6 months.     If you have any questions or concerns, please call the clinic at the number listed above.       Sincerely,        You Care Team - [facility]

## 2024-12-30 NOTE — TELEPHONE ENCOUNTER
Attempt #2 to call patient.     RN left voicemail and requested return call to [facility] at [phone].     Ruby Mcnally RN, BSN  [facility]: [place]

## 2025-01-13 ENCOUNTER — TELEPHONE (OUTPATIENT)
Dept: FAMILY MEDICINE | Facility: CLINIC | Age: 75
End: 2025-01-13
Payer: COMMERCIAL

## 2025-01-13 NOTE — TELEPHONE ENCOUNTER
Patient Quality Outreach    Patient is due for the following:   Physical Annual Wellness Visit    Action(s) Taken:   Schedule a Annual Wellness Visit    Type of outreach:    Chart review performed, no outreach needed.    Patient scheduled for 5/19/25    Questions for provider review:    None           Zoe De La O CMA

## 2025-02-05 NOTE — MR AVS SNAPSHOT
After Visit Summary   6/22/2017    Devon Watts    MRN: [id number]           Patient Information     Date Of Birth          1950        Visit Information        Provider Department      6/22/2017 1:00 PM Nicole Magdaleno DO [facility]        Today's Diagnoses     Shoulder impingement, unspecified laterality    -  1    Hyperlipidemia with target LDL less than 100        Type 2 diabetes mellitus without complication, without long-term current use of insulin (H)        Benign essential hypertension          Care Instructions    [facility]   Discharged by : Maricruz WESTBROOK Certified Medical Assistant (AAMA)   Paper scripts provided to patient : 3   If you have any questions regarding to your visit please contact your care team:   [facility] Hours Telephone Number   MATIAS Rangel Dr., PA-C    7am-7pm Monday - Thursday   7am-5pm Fridays  [phone]   (Appointment scheduling available 24/7)   RN Line   [phone] option 2       What options do I have for visits at the clinic other than the traditional office visit?   To expand how we care for you, many of our providers are utilizing electronic visits (e-visits) and telephone visits, when medically appropriate, for interactions with their patients rather than a visit in the clinic. We also offer nurse visits for many medical concerns. Just like any other service, we will bill your insurance company for this type of visit based on time spent on the phone with your provider. Not all insurance companies cover these visits. Please check with your medical insurance if this type of visit is covered. You will be responsible for any charges that are not paid by your insurance.     E-visits via Rapid[employer]: generally incur a $35.00 fee.     Telephone visits:   Time spent on the phone: *charged based on time that is spent on the phone in increments of 10 minutes.  Estimated cost:   5-10 mins $30.00   11-20 mins. $59.00   21-30 mins. $85.00   Use DailyTickethart (secure email communication and access to your chart) to send your primary care provider a message or make an appointment. Ask someone on your Team how to sign up for [employer].   For a Price Quote for your services, please call our Consumer Price Line at [phone].   As always, Thank you for trusting us with your health care needs!                [facility]:    Scheduling Appointments  [facility]  Call: [phone]    [facility]  Call: [phone]    [facility]  Call: [phone]                    Follow-ups after your visit        Your next 10 appointments already scheduled     Jul 07, 2017 11:00 AM CDT   Colonoscopy with Eric North MD   [facility])    [address] [place] [id number] [phone]              Who to contact     If you have questions or need follow up information about today's clinic visit or your schedule please contact [facility] directly at [phone].  Normal or non-critical lab and imaging results will be communicated to you by MyChart, letter or phone within 4 business days after the clinic has received the results. If you do not hear from us within 7 days, please contact the clinic through MyChart or phone. If you have a critical or abnormal lab result, we will notify you by phone as soon as possible.  Submit refill requests through [email or web address] or call your pharmacy and they will forward the refill request to us. Please allow 3 business days for your refill to be completed.          Additional Information About Your Visit        DailyTickethart Information     [email or web address] lets you send messages to your doctor, view your test results, renew your prescriptions, schedule appointments and more. To sign up, go to  "[email or web address] . Click on \"Log in\" on the left side of the screen, which will take you to the Welcome page. Then click on \"Sign up Now\" on the right side of the page.     You will be asked to enter the access code listed below, as well as some personal information. Please follow the directions to create your username and password.     Your access code is: [email or web address]  Expires: 2017  3:55 PM     Your access code will  in 90 days. If you need help or a new code, please call your [facility] clinic or [phone].        Care EveryWhere ID     This is your Care EveryWhere ID. This could be used by other organizations to access your [facility] medical records  [id number]        Your Vitals Were     Pulse Temperature BMI (Body Mass Index)             78 98.4  F (36.9  C) (Oral) 23.15 kg/m2          Blood Pressure from Last 3 Encounters:   17 122/58   17 116/62   17 128/64    Weight from Last 3 Encounters:   17 137 lb (62.1 kg)   17 132 lb 8 oz (60.1 kg)   17 139 lb (63 kg)              We Performed the Following     Basic metabolic panel  (Ca, Cl, CO2, Creat, Gluc, K, Na, BUN)     LDL cholesterol direct          Today's Medication Changes          These changes are accurate as of: 17  1:42 PM.  If you have any questions, ask your nurse or doctor.               These medicines have changed or have updated prescriptions.        Dose/Directions    * traMADol 50 MG tablet   Commonly known as:  ULTRAM   This may have changed:  additional instructions   Used for:  Shoulder impingement, unspecified laterality   Changed by:  Nicole Magdaleno,         Dose:   mg   Take 1-2 tablets ( mg) by mouth every 6 hours as needed for pain (maximum 8 tablets per day) Do not fill before 17   Quantity:  30 tablet   Refills:  0       * traMADol 50 MG tablet   Commonly known as:  ULTRAM   This may have changed:  additional instructions   Used for:  Shoulder impingement, " unspecified laterality   Changed by:  Nicole Magdaleno DO        TAKE 1 TO 2 TABLETS BY MOUTH EVERY 6 HOURS AS NEEDED FOR PAIN(MAX 8 TABLETS PER DAY) Do not fill before 8/7/17   Quantity:  30 tablet   Refills:  0       * traMADol 50 MG tablet   Commonly known as:  ULTRAM   This may have changed:  additional instructions   Used for:  Shoulder impingement, unspecified laterality   Changed by:  Nicole Magdaleno DO        Dose:  50 mg   Take 1 tablet (50 mg) by mouth every 6 hours as needed for moderate pain Take 1-2 tablets as needed do not fill before 7/9/17   Quantity:  30 tablet   Refills:  0       * Notice:  This list has 3 medication(s) that are the same as other medications prescribed for you. Read the directions carefully, and ask your doctor or other care provider to review them with you.         Where to get your medicines      Some of these will need a paper prescription and others can be bought over the counter.  Ask your nurse if you have questions.     Bring a paper prescription for each of these medications     traMADol 50 MG tablet    traMADol 50 MG tablet    traMADol 50 MG tablet                Primary Care Provider Office Phone # Fax #    Nicole DO Sharla [phone]       [address] [address]        Equal Access to Services     RANDOLPH ELMORE : Ran hectoro Soclaudia, wajaspalda luqadaha, qaybta kaalmada adeegyada, yesi anna. So [facility] [phone].    ATENCIÓN: Si habla español, tiene a nicole disposición servicios gratuitos de asistencia lingüística. Llame al [phone].    We comply with applicable federal civil rights laws and [place] laws. We do not discriminate on the basis of race, color, national origin, age, disability sex, sexual orientation or gender identity.            Thank you!     Thank you for choosing [facility]  for your care. Our goal is always to provide you with excellent  care. Hearing back from our patients is one way we can continue to improve our services. Please take a few minutes to complete the written survey that you may receive in the mail after your visit with us. Thank you!             Your Updated Medication List - Protect others around you: Learn how to safely use, store and throw away your medicines at www.disposemymeds.org.          This list is accurate as of: 6/22/17  1:42 PM.  Always use your most recent med list.                   Brand Name Dispense Instructions for use Diagnosis    aspirin 325 MG tablet      Take 325 mg by mouth daily        atorvastatin 10 MG tablet    LIPITOR    90 tablet    Take 1 tablet (10 mg) by mouth daily    Hyperlipidemia with target LDL less than 100       glipiZIDE 10 MG tablet    GLUCOTROL    180 tablet    Take 1 tablet (10 mg) by mouth 2 times daily (before meals)    Type 2 diabetes mellitus without complication, without long-term current use of insulin (H)       lisinopril 20 MG tablet    PRINIVIL/ZESTRIL    90 tablet    Take 1 tablet (20 mg) by mouth daily    Benign essential hypertension       metFORMIN 1000 MG tablet    GLUCOPHAGE    180 tablet    Take 1 tablet (1,000 mg) by mouth 2 times daily (with meals)    Type 2 diabetes mellitus without complication, without long-term current use of insulin (H)       * traMADol 50 MG tablet    ULTRAM    30 tablet    Take 1-2 tablets ( mg) by mouth every 6 hours as needed for pain (maximum 8 tablets per day) Do not fill before 9/5/17    Shoulder impingement, unspecified laterality       * traMADol 50 MG tablet    ULTRAM    30 tablet    TAKE 1 TO 2 TABLETS BY MOUTH EVERY 6 HOURS AS NEEDED FOR PAIN(MAX 8 TABLETS PER DAY) Do not fill before 8/7/17    Shoulder impingement, unspecified laterality       * traMADol 50 MG tablet    ULTRAM    30 tablet    Take 1 tablet (50 mg) by mouth every 6 hours as needed for moderate pain Take 1-2 tablets as needed do not fill before 7/9/17    Shoulder  impingement, unspecified laterality       * Notice:  This list has 3 medication(s) that are the same as other medications prescribed for you. Read the directions carefully, and ask your doctor or other care provider to review them with you.       no

## 2025-02-14 DIAGNOSIS — M75.42 IMPINGEMENT SYNDROME OF BOTH SHOULDERS: ICD-10-CM

## 2025-02-14 DIAGNOSIS — M75.41 IMPINGEMENT SYNDROME OF BOTH SHOULDERS: ICD-10-CM

## 2025-02-16 RX ORDER — TRAMADOL HYDROCHLORIDE 50 MG/1
50 TABLET ORAL EVERY 6 HOURS PRN
Qty: 90 TABLET | Refills: 0 | Status: SHIPPED | OUTPATIENT
Start: 2025-02-16

## 2025-03-06 ENCOUNTER — TELEPHONE (OUTPATIENT)
Dept: GASTROENTEROLOGY | Facility: CLINIC | Age: 75
End: 2025-03-06
Payer: COMMERCIAL

## 2025-03-06 NOTE — TELEPHONE ENCOUNTER
Caller: Pt's wife, Radhika    Reason for Reschedule/Cancellation (please be detailed, any staff messages or encounters to note?):   Pt ill    Did you cancel or rescheduled an EUS procedure? No.    Is screening questionnaire older than 3 months from the reschedule date.   If Yes, please complete screening questionnaire. Yes    Prior to reschedule please review:  Ordering Provider: DENIS JUAN   Sedation Determined: GENERAL  Does patient have any ASC Exclusions, please identify?: no    Notes on Cancelled Procedure:  Procedure: Lower Endoscopy [Colonoscopy]   Date: 3/6/25  Location: [facility]; [facility] [address] [place] [address]   Surgeon: MANFRED    Rescheduled: No, CASE IN DEPOT - removed by site

## 2025-04-06 ENCOUNTER — TRANSFERRED RECORDS (OUTPATIENT)
Dept: HEALTH INFORMATION MANAGEMENT | Facility: CLINIC | Age: 75
End: 2025-04-06

## 2025-04-06 LAB — RETINOPATHY: NEGATIVE

## 2025-04-14 DIAGNOSIS — M75.41 IMPINGEMENT SYNDROME OF BOTH SHOULDERS: ICD-10-CM

## 2025-04-14 DIAGNOSIS — M75.42 IMPINGEMENT SYNDROME OF BOTH SHOULDERS: ICD-10-CM

## 2025-04-14 RX ORDER — TRAMADOL HYDROCHLORIDE 50 MG/1
50 TABLET ORAL EVERY 6 HOURS PRN
Qty: 90 TABLET | Refills: 0 | Status: SHIPPED | OUTPATIENT
Start: 2025-04-14

## 2025-05-28 ENCOUNTER — TELEPHONE (OUTPATIENT)
Dept: GASTROENTEROLOGY | Facility: CLINIC | Age: 75
End: 2025-05-28
Payer: COMMERCIAL

## 2025-05-28 NOTE — TELEPHONE ENCOUNTER
Attempted to contact patient in order to complete pre assessment questions.     No answer. Left message to return call to [phone] option 3    Callback communication sent via [employer].      Eri Snyder LPN  Endoscopy Procedure Pre Assessment

## 2025-05-28 NOTE — TELEPHONE ENCOUNTER
Rescheduled Colonoscopy x2  Due to no show DOS and illness    Pre visit planning completed.      Procedure details:    Patient scheduled for Colonoscopy on 6/12/25.     Arrival time: 1000. Procedure time 1100    Facility location: [facility] [address] [place] [address]. Check in location: 1st Floor [facility] [place].     Sedation type: General    Pre op exam needed? Yes. Pre op exam is scheduled on 5/30/25 with Nicole Magdaleno.    Indication for procedure: screening      Chart review:     Electronic implanted devices? No    Recent diagnosis of diverticulitis within the last 6 weeks? No      Medication review:    Diabetic? Yes. Oral diabetic medications: Glipizide (glucotrol): HOLD day of procedure.  Metformin (glucophage): HOLD day of procedure.    Anticoagulants? No    Weight loss medication/injectable? No GLP-1 medication per patient's medication list. Nursing to verify with pre-assessment call.    Other medication HOLDING recommendations:  N/A      Prep for procedure:     Bowel prep recommendation: Extended Golytely. Bowel prep previously sent on 2/19/25 to [facility] #[id number] - [place] [phone] & [facility].  -note dispense date listed as 2/19/25. Verify it patient has prep or needs new Rx sent.    Due to: chronic pain medication noted in chart, CKD noted, and diabetes    Prep instructions sent via secure message link sent by email and text.         Melany Estes RN  Endoscopy Procedure Pre Assessment   [phone] option 3

## 2025-05-30 NOTE — TELEPHONE ENCOUNTER
Second call attempt to complete pre assessment.     No answer. Left message to return call to [phone] #3 by next business day prior to 4PM.    Callback communication sent via secure message link sent by email and text.      Melany Estes RN  Endoscopy Procedure Pre Assessment

## 2025-06-04 NOTE — TELEPHONE ENCOUNTER
No return call received.   Pre assessment was not completed for upcoming scheduled procedure.     Did NOT send case for cancellation due to patient already completed the required pre op exam on 5/30/25.      Shanell Roy RN  Endoscopy Procedure Pre-Assessment RN  [phone], option 3

## 2025-06-12 ENCOUNTER — HOSPITAL ENCOUNTER (OUTPATIENT)
Facility: CLINIC | Age: 75
Discharge: HOME | End: 2025-06-12
Attending: INTERNAL MEDICINE | Admitting: INTERNAL MEDICINE
Payer: COMMERCIAL

## 2025-06-12 ENCOUNTER — ANESTHESIA EVENT (OUTPATIENT)
Dept: GASTROENTEROLOGY | Facility: CLINIC | Age: 75
End: 2025-06-12

## 2025-06-12 ENCOUNTER — ANESTHESIA (OUTPATIENT)
Dept: GASTROENTEROLOGY | Facility: CLINIC | Age: 75
End: 2025-06-12
Payer: COMMERCIAL

## 2025-06-12 VITALS
RESPIRATION RATE: 14 BRPM | DIASTOLIC BLOOD PRESSURE: 67 MMHG | WEIGHT: 136 LBS | SYSTOLIC BLOOD PRESSURE: 121 MMHG | HEART RATE: 79 BPM | OXYGEN SATURATION: 99 % | BODY MASS INDEX: 23.34 KG/M2

## 2025-06-12 LAB — COLONOSCOPY: NORMAL

## 2025-06-12 PROCEDURE — 88305 TISSUE EXAM BY PATHOLOGIST: CPT | Mod: 26 | Performed by: PATHOLOGY

## 2025-06-12 PROCEDURE — 45380 COLONOSCOPY AND BIOPSY: CPT | Performed by: INTERNAL MEDICINE

## 2025-06-12 PROCEDURE — 88305 TISSUE EXAM BY PATHOLOGIST: CPT | Mod: TC | Performed by: INTERNAL MEDICINE

## 2025-06-12 PROCEDURE — 999N000010 HC STATISTIC ANES STAT CODE-CRNA PER MINUTE: Performed by: INTERNAL MEDICINE

## 2025-06-12 PROCEDURE — 370N000017 HC ANESTHESIA TECHNICAL FEE, PER MIN: Performed by: INTERNAL MEDICINE

## 2025-06-12 RX ORDER — PROPOFOL 10 MG/ML
INJECTION, EMULSION INTRAVENOUS CONTINUOUS PRN
Status: DISCONTINUED | OUTPATIENT
Start: 2025-06-12 | End: 2025-06-12

## 2025-06-12 RX ORDER — PROPOFOL 10 MG/ML
INJECTION, EMULSION INTRAVENOUS PRN
Status: DISCONTINUED | OUTPATIENT
Start: 2025-06-12 | End: 2025-06-12

## 2025-06-12 RX ORDER — LIDOCAINE HYDROCHLORIDE 20 MG/ML
INJECTION, SOLUTION INFILTRATION; PERINEURAL PRN
Status: DISCONTINUED | OUTPATIENT
Start: 2025-06-12 | End: 2025-06-12

## 2025-06-12 RX ORDER — ONDANSETRON 2 MG/ML
INJECTION INTRAMUSCULAR; INTRAVENOUS PRN
Status: DISCONTINUED | OUTPATIENT
Start: 2025-06-12 | End: 2025-06-12

## 2025-06-12 RX ORDER — SODIUM CHLORIDE, SODIUM LACTATE, POTASSIUM CHLORIDE, CALCIUM CHLORIDE 600; 310; 30; 20 MG/100ML; MG/100ML; MG/100ML; MG/100ML
INJECTION, SOLUTION INTRAVENOUS CONTINUOUS PRN
Status: DISCONTINUED | OUTPATIENT
Start: 2025-06-12 | End: 2025-06-12

## 2025-06-12 RX ADMIN — LIDOCAINE HYDROCHLORIDE 60 MG: 20 INJECTION, SOLUTION INFILTRATION; PERINEURAL at 10:37

## 2025-06-12 RX ADMIN — ONDANSETRON 4 MG: 2 INJECTION INTRAMUSCULAR; INTRAVENOUS at 10:37

## 2025-06-12 RX ADMIN — PROPOFOL 30 MG: 10 INJECTION, EMULSION INTRAVENOUS at 10:37

## 2025-06-12 RX ADMIN — PROPOFOL 200 MCG/KG/MIN: 10 INJECTION, EMULSION INTRAVENOUS at 10:37

## 2025-06-12 RX ADMIN — SODIUM CHLORIDE, SODIUM LACTATE, POTASSIUM CHLORIDE, CALCIUM CHLORIDE: 600; 310; 30; 20 INJECTION, SOLUTION INTRAVENOUS at 10:37

## 2025-06-12 ASSESSMENT — ACTIVITIES OF DAILY LIVING (ADL)
ADLS_ACUITY_SCORE: 41
ADLS_ACUITY_SCORE: 41

## 2025-06-12 NOTE — ANESTHESIA CARE TRANSFER NOTE
Patient: Devon Watts    Procedure: Procedure(s):  Colonoscopy       Diagnosis: Screen for colon cancer [Z12.11]  Diagnosis Additional Information: No value filed.    Anesthesia Type:   MAC     Note:    Oropharynx: oropharynx clear of all foreign objects and spontaneously breathing  Level of Consciousness: drowsy  Oxygen Supplementation: room air    Independent Airway: airway patency satisfactory and stable  Dentition: dentition unchanged  Vital Signs Stable: post-procedure vital signs reviewed and stable  Report to RN Given: handoff report given  Patient transferred to: PACU  Comments: At end of procedure, spontaneous respirations. Patient breathing room air at room air to PACU. SpO2, NiBP, and EKG monitors and alarms on and functioning, report on patient's clinical status given to PACU RN, RN questions answered.      Handoff Report: Identifed the Patient, Identified the Reponsible Provider, Reviewed the pertinent medical history, Discussed the surgical course, Reviewed Intra-OP anesthesia mangement and issues during anesthesia, Set expectations for post-procedure period and Allowed opportunity for questions and acknowledgement of understanding      Vitals:  Vitals Value Taken Time   BP     Temp     Pulse     Resp     SpO2         Electronically Signed By: CASI Merchant CRNA  June 12, 2025  10:53 AM

## 2025-06-12 NOTE — ANESTHESIA POSTPROCEDURE EVALUATION
Patient: Devon Watts    Procedure: Procedure(s):  COLONOSCOPY, WITH POLYPECTOMY AND BIOPSY       Anesthesia Type:  MAC    Note:  Disposition: Outpatient   Postop Pain Control: Uneventful            Sign Out: Well controlled pain   PONV: No   Neuro/Psych: Uneventful            Sign Out: Acceptable/Baseline neuro status   Airway/Respiratory: Uneventful            Sign Out: Acceptable/Baseline resp. status   CV/Hemodynamics: Uneventful            Sign Out: Acceptable CV status   Other NRE: NONE   DID A NON-ROUTINE EVENT OCCUR?            Last vitals:  Vitals Value Taken Time   /61 06/12/25 11:21   Temp     Pulse 81 06/12/25 11:28   Resp 11 06/12/25 11:28   SpO2 99 % 06/12/25 11:28   Vitals shown include unfiled device data.    Electronically Signed By: Ernesto Singh MD  June 12, 2025  11:30 AM

## 2025-06-12 NOTE — ANESTHESIA PREPROCEDURE EVALUATION
Anesthesia Pre-Procedure Evaluation    Patient: Devon Watts   MRN: [id number] : 1950          Procedure : Procedure(s):  Colonoscopy         No past medical history on file.   No past surgical history on file.   No Known Allergies   Social History     Tobacco Use    Smoking status: Never     Passive exposure: Never    Smokeless tobacco: Never   Substance Use Topics    Alcohol use: No      Wt Readings from Last 1 Encounters:   25 61.7 kg (136 lb)        Anesthesia Evaluation            ROS/MED HX  ENT/Pulmonary:    (-) asthma and sleep apnea   Neurologic:       Cardiovascular:     (+)  hypertension- -   -  - -                                      METS/Exercise Tolerance:     Hematologic:       Musculoskeletal:       GI/Hepatic:    (-) GERD   Renal/Genitourinary:     (+) renal disease, type: CRI,            Endo:     (+)  type II DM,        thyroid problem,         (-) obesity   Psychiatric/Substance Use:     (+)    H/O chronic opioid use .     Infectious Disease:       Malignancy:       Other:              Physical Exam  Airway  Mallampati: III  TM distance: >3 FB  Neck ROM: full  Mouth opening: < 4 cm    Cardiovascular - normal exam   Dental   (+) Modest Abnormalities - crowns, retainers, 1 or 2 missing teeth      Pulmonary - normal exam      Neurological - normal exam  He appears awake, alert and oriented x3.    Other Findings       OUTSIDE LABS:  CBC:   Lab Results   Component Value Date    WBC 5.2 2025    WBC 5.9 2024    HGB 12.1 (L) 2025    HGB 11.4 (L) 2024    HCT 37.1 (L) 2025    HCT 34.7 (L) 2024     2025     2024     BMP:   Lab Results   Component Value Date     2024     2024    POTASSIUM 4.8 2024    POTASSIUM 4.7 2024    CHLORIDE 108 (H) 2024    CHLORIDE 108 (H) 2024    CO2 25 2024    CO2 23 2024    BUN 32.1 (H) 2024    BUN 28.0 (H) 2024    CR 1.55 (H)  "12/23/2024    CR 1.78 (H) 11/18/2024     (H) 12/23/2024     (H) 11/18/2024     COAGS: No results found for: \"PTT\", \"INR\", \"FIBR\"  POC: No results found for: \"BGM\", \"HCG\", \"HCGS\"  HEPATIC: No results found for: \"ALBUMIN\", \"PROTTOTAL\", \"ALT\", \"AST\", \"GGT\", \"ALKPHOS\", \"BILITOTAL\", \"BILIDIRECT\", \"PATRICK\"  OTHER:   Lab Results   Component Value Date    A1C 7.5 (H) 05/30/2025    STORMY 9.5 12/23/2024    TSH 1.74 11/18/2024       Anesthesia Plan    ASA Status:  2      NPO Status: NPO Appropriate   Anesthesia Type: MAC.   Techniques and Equipment:       - Monitoring Plan: standard ASA monitoring     Consents    Anesthesia Plan(s) and associated risks, benefits, and realistic alternatives discussed. Questions answered and patient/representative(s) expressed understanding.     - Discussed: anesthesiologist, CRNA     - Discussed with:  Patient               Postoperative Care    Pain management: non-narcotic analgesics.     Comments:                   Ernesto Singh MD    I have reviewed the pertinent notes and labs in the chart from the past 30 days and (re)examined the patient.  Any updates or changes from those notes are reflected in this note.    Clinically Significant Risk Factors Present on Admission                 # Drug Induced Platelet Defect: home medication list includes an antiplatelet medication   # Hypertension: Noted on problem list          # DMII: A1C = 7.5 % (Ref range: 0.0 - 5.6 %) within past 6 months                     "

## 2025-06-13 LAB
PATH REPORT.COMMENTS IMP SPEC: NORMAL
PATH REPORT.COMMENTS IMP SPEC: NORMAL
PATH REPORT.FINAL DX SPEC: NORMAL
PATH REPORT.GROSS SPEC: NORMAL
PATH REPORT.MICROSCOPIC SPEC OTHER STN: NORMAL
PATH REPORT.RELEVANT HX SPEC: NORMAL
PHOTO IMAGE: NORMAL

## 2025-06-26 PROBLEM — D12.6 COLON ADENOMA: Status: ACTIVE | Noted: 2025-06-26

## 2025-07-07 ENCOUNTER — TELEPHONE (OUTPATIENT)
Dept: FAMILY MEDICINE | Facility: CLINIC | Age: 75
End: 2025-07-07
Payer: COMMERCIAL

## 2025-07-07 NOTE — TELEPHONE ENCOUNTER
Patient Quality Outreach    Patient is due for the following:   Physical Annual Wellness Visit    Action(s) Taken:   Schedule a Annual Wellness Visit    Type of outreach:    Phone, left message for patient/parent to call back.    Questions for provider review:    None         Zoe De La O CMA  Chart routed to None.

## 2025-07-07 NOTE — LETTER
July 16, 2025        Devon Watts  [address] [facility] [address]      Hello,     Your care team at [facility]  values your health and well-being. After reviewing your chart, we have identified recommendation(s) to help you better manage your health.    It's time for your Annual Wellness visit. During your visit, we'll discuss your health, well-being, and any questions you may have related to preventive care. We'll also review any recommended tests, exams, or screenings you might need. To schedule please call your clinic [phone] or use your [employer] account.     If you recently had or are having any of these services soon, please contact the clinic via phone or [employer] so that your care team can update your records.    We look forward to seeing you at your upcoming visit.    If you have any questions or concerns, please contact our clinic. Thank you for continuing to trust us with your care.    Sincerely,    Your [facility] Care Team

## 2025-08-04 DIAGNOSIS — M75.41 IMPINGEMENT SYNDROME OF BOTH SHOULDERS: ICD-10-CM

## 2025-08-04 DIAGNOSIS — M75.42 IMPINGEMENT SYNDROME OF BOTH SHOULDERS: ICD-10-CM

## 2025-08-05 RX ORDER — TRAMADOL HYDROCHLORIDE 50 MG/1
50 TABLET ORAL EVERY 6 HOURS PRN
Qty: 90 TABLET | Refills: 0 | Status: SHIPPED | OUTPATIENT
Start: 2025-08-05